# Patient Record
Sex: FEMALE | Race: WHITE | NOT HISPANIC OR LATINO | Employment: FULL TIME | URBAN - METROPOLITAN AREA
[De-identification: names, ages, dates, MRNs, and addresses within clinical notes are randomized per-mention and may not be internally consistent; named-entity substitution may affect disease eponyms.]

---

## 2017-02-16 ENCOUNTER — OFFICE VISIT (OUTPATIENT)
Dept: PSYCHIATRY | Facility: CLINIC | Age: 37
End: 2017-02-16
Payer: COMMERCIAL

## 2017-02-16 DIAGNOSIS — G47.33 OBSTRUCTIVE SLEEP APNEA SYNDROME: ICD-10-CM

## 2017-02-16 DIAGNOSIS — F41.1 GAD (GENERALIZED ANXIETY DISORDER): Primary | ICD-10-CM

## 2017-02-16 DIAGNOSIS — R53.82 CHRONIC FATIGUE: ICD-10-CM

## 2017-02-16 DIAGNOSIS — F33.41 MDD (MAJOR DEPRESSIVE DISORDER), RECURRENT, IN PARTIAL REMISSION: ICD-10-CM

## 2017-02-16 DIAGNOSIS — G47.00 INSOMNIA, UNSPECIFIED TYPE: ICD-10-CM

## 2017-02-16 PROCEDURE — 99214 OFFICE O/P EST MOD 30 MIN: CPT | Mod: S$GLB,,, | Performed by: PSYCHIATRY & NEUROLOGY

## 2017-02-16 RX ORDER — FLUOXETINE HYDROCHLORIDE 40 MG/1
40 CAPSULE ORAL DAILY
Qty: 30 CAPSULE | Refills: 2 | Status: SHIPPED | OUTPATIENT
Start: 2017-02-16 | End: 2017-06-26 | Stop reason: SDUPTHER

## 2017-02-16 NOTE — PROGRESS NOTES
"ID: 36yo WF w h/o MDD recurrent moderate and SAE. Referred for txmt by therapist Dr.W Roman. Patient stable on prozac 40mg po daily.    CC: anxiety    Interim Hx: presents on time. 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 The clothes are great. Plus sizes are the same as normal sizes. I get energized playing with the stuff." hired a friend as an assistant due to her productivity.    Seeing  ENT and had sinus surgery in 10/2016- headaches much better, no more dental pain, no more facial pain. He is also board certified in sleep- having cipap machine titration. New level is a 12 based on testing. (had previously been 8)- will have a tech change the setting. Optimistic about how this can be helpful.     Continues taking Prozac 40mg po qam. Sees YAEL Roman 1x/wk for therapy for several years.     On Psychiatric ROS:    Endorses chronic difficulty with sleep- see above, but working on cipap settings, denies anhedonia- is enjoying new work, denies feeling helpless/hopeless, dec energy (chronic- unchanged), stable concentration, inc appetite- portion control is difficult- stable weight (morbidly obese), denies dec PMA    Denies thoughts of SI/intent/plan.     Denies feeling more easily overwhelmed, +chronic ruminative thinking (not worse), +feeling tense/"on edge"- "that depends on the day."     PPHx: Denies h/o self injury "although I may be injuring myself with food"  inpt psych hospitalization  denies h/o suicide attempt     Current Psych Meds: prozac 40mg po qam  Past Psych Meds: zoloft 100mg (ineffective), effexor 75mg po qam (through PCP, can't recall effect), trazodone (nightmares), wellbutrin xl 150mg (h/a's and tmj), klonopin, ambien (ineffective)    PMHx: PCOS, AGUSTIN/cipap (doesn't wear), chronic sinusitis/deviated septum, morbid obesity    SubstHx:   T- " "none  E- quit 11/2015 due to migraines; h/o binge drinking in college  D- denies   Caffeine- minimal- sometimes tea    FamPHx: M-anxiety, hoarding; Br- autism spectrum; S- "hot mess; she has anxiety, depression but I think she has a personality d/o", F-PTSD, MDD following MI (2/2 [facility] Nam)     Musculoskeletal:  Muscle strength/Tone: no dyskinesia/ no tremor  Gait/Station- non antalgic, no assistance needed    MSE: appears stated age, well groomed, appropriate dress, morbidly obese, engages well with examiner. Good e/c. Speech reg rate and vol, nonpressured. Mood is "pretty good." Affect congruent. No physical evidence of emotion. Sensorium fully intact. Oriented to date/day/location, current events. Narrative memory intact. Intellectual function is avg based on vocab and basic fund of knowledge. Thought is c/l/gd. No tangentiality or circumstantiality. No FOI/JULIA. Denies SI/HI. Denies A/VH. No evidence of delusions. Insight fair and Judgment intact.     Suicide Risk Assessment:   Protective- age, gender, no prior attempts, no prior hospitalizations, no family h/o attempts, no ongoing substance abuse, no psychosis, , has children, denies SI/intent/plan, seeking treatment, access to treatment, future oriented, good primary support, no access to firearms    Risk- race, ongoing Axis I sxs    **Pt is at LOW imminent and long term risk of suicide given current risk factors.    Assessment:  37yo WF w h/o MDD recurrent moderate and SAE. Referred for txmt by therapist Dr.W Roman. On eval the pt has a h/o MDD recurrent moderate which is currently in remission on prozac 40mg po qam and SAE which continues. Also a h/o PTSD sxs from childhood bullying which has set a pervasive pattern of self loathing and shame for this pt- now evidencing itself in poor self care- poor attention to overall wellness. BMI >45. No acute safety concerns. Pt continues weekly therapy with longstanding therapist Elan Roman with whom I have " discussed the case. No med changes at this time. RTC 3mos.     Axis I: MDD, recurrent moderate; SAE  Axis II: cluster b traits  Axis III: morbid obesity (BMI 47), insulin insensitivity, PCOS, AGUSTIN on cpap  Axis IV: chronic health and mental health concerns  Axis V: GAF    Plan:   1. Cont Prozac 40mg po qam  2. Cont weekly therapy with YAEL Roman  4. RTC 3mos    -Spent 30min face to face with the pt; >50% time spent in counseling   -Supportive therapy and psychoeducation provided  -R/B/SE's of medications discussed with the pt who expresses understanding and chooses to take medications as prescribed.   -Pt instructed to call clinic, 911 or go to nearest emergency room if sxs worsen or pt is in   crisis. The pt expresses understanding.

## 2017-06-23 PROBLEM — R41.840 POOR CONCENTRATION: Status: ACTIVE | Noted: 2017-06-23

## 2017-06-23 PROBLEM — K21.9 GERD (GASTROESOPHAGEAL REFLUX DISEASE): Status: ACTIVE | Noted: 2017-06-23

## 2017-06-26 RX ORDER — FLUOXETINE HYDROCHLORIDE 40 MG/1
CAPSULE ORAL
Qty: 30 CAPSULE | Refills: 0 | Status: SHIPPED | OUTPATIENT
Start: 2017-06-26 | End: 2017-07-18 | Stop reason: SDUPTHER

## 2017-07-17 PROBLEM — D50.9 MICROCYTIC ANEMIA: Status: ACTIVE | Noted: 2017-07-17

## 2017-07-17 PROBLEM — R53.83 FEELING TIRED: Status: ACTIVE | Noted: 2017-07-17

## 2017-07-18 ENCOUNTER — OFFICE VISIT (OUTPATIENT)
Dept: PSYCHIATRY | Facility: CLINIC | Age: 37
End: 2017-07-18
Payer: COMMERCIAL

## 2017-07-18 VITALS
HEIGHT: 66 IN | DIASTOLIC BLOOD PRESSURE: 77 MMHG | BODY MASS INDEX: 46.95 KG/M2 | HEART RATE: 93 BPM | WEIGHT: 292.13 LBS | SYSTOLIC BLOOD PRESSURE: 159 MMHG

## 2017-07-18 DIAGNOSIS — F51.01 PRIMARY INSOMNIA: ICD-10-CM

## 2017-07-18 DIAGNOSIS — F41.1 GAD (GENERALIZED ANXIETY DISORDER): Primary | ICD-10-CM

## 2017-07-18 DIAGNOSIS — F33.41 MDD (MAJOR DEPRESSIVE DISORDER), RECURRENT, IN PARTIAL REMISSION: ICD-10-CM

## 2017-07-18 DIAGNOSIS — G47.30 SLEEP APNEA, UNSPECIFIED TYPE: ICD-10-CM

## 2017-07-18 PROCEDURE — 99999 PR PBB SHADOW E&M-EST. PATIENT-LVL II: CPT | Mod: PBBFAC,,, | Performed by: PSYCHIATRY & NEUROLOGY

## 2017-07-18 PROCEDURE — 99214 OFFICE O/P EST MOD 30 MIN: CPT | Mod: S$GLB,,, | Performed by: PSYCHIATRY & NEUROLOGY

## 2017-07-18 RX ORDER — FLUOXETINE HYDROCHLORIDE 40 MG/1
40 CAPSULE ORAL DAILY
Qty: 30 CAPSULE | Refills: 0 | Status: SHIPPED | OUTPATIENT
Start: 2017-07-18 | End: 2017-08-21 | Stop reason: SDUPTHER

## 2017-07-18 NOTE — PROGRESS NOTES
"ID: 36yo WF w h/o MDD recurrent moderate and SAE. Referred for txmt by therapist Dr.W Roman. Patient stable on prozac 40mg po daily.    CC: anxiety    Interim Hx: presents on time. Per chart review, vyvanse has been started by her pcp for a presumed BED. Has had 6lb loss.     "I just don't feel the need to continue eating. There's like an off switch. The stopping was always an issue." pt's  "not on board" with weight loss surgery. My concern is that for years of treatment with me the pt has not engaged in concerted effort at her own weight loss through dietary changes. Today continues to deny that she "believes" her dietary choices affect her mood/anxiety and reports that when "much thinner I was still depressed and anxious because I was worried about being thin." BP elevated today? First elevated read- may be a contraindication to increasing the medication dose.     Continues taking Prozac 40mg po qam. Is no longer seeing  for therapy due to scheduling conflicts, per pt. Today she reports that while anxiety and focus are improved on the vyvanse, her mood has seemed "worse. Like I don't feel like engaging in things, I'm kindof withdrawing and not wanting to participate."    I ask the pt to please make some dietary interventions and see if changes affect mood- will f/u in 4wks- pvu    On Psychiatric ROS:    Endorses chronic difficulty with sleep, but working on cipap settings, endorsing some anhedonia- although she is enjoying new work and her daughter, denies feeling helpless/hopeless, dec energy (chronic- unchanged), improved concentration on vyvanse, dec appetite- on vyvanse, +dec PMA    Denies thoughts of SI/intent/plan.     Denies feeling more easily overwhelmed, +chronic ruminative thinking (not worse), +feeling tense/"on edge"- "that depends on the day."     PPHx: Denies h/o self injury "although I may be injuring myself with food"  inpt psych hospitalization  denies h/o suicide attempt " "    Current Psych Meds: prozac 40mg po qam  Past Psych Meds: zoloft 100mg (ineffective), effexor 75mg po qam (through PCP, can't recall effect), trazodone (nightmares), wellbutrin xl 150mg (h/a's and tmj), klonopin, ambien (ineffective)    PMHx: PCOS, AGUSTIN/cipap (doesn't wear), chronic sinusitis/deviated septum, morbid obesity    SubstHx:   T- none  E- quit 11/2015 due to migraines; h/o binge drinking in college  D- denies   Caffeine- minimal- sometimes tea    FamPHx: M-anxiety, hoarding; Br- autism spectrum; S- "hot mess; she has anxiety, depression but I think she has a personality d/o", F-PTSD, MDD following MI (2/2 [facility] Nam)     Musculoskeletal:  Muscle strength/Tone: no dyskinesia/ no tremor  Gait/Station- non antalgic, no assistance needed    MSE: appears stated age, well groomed, appropriate dress, morbidly obese, engages well with examiner. Good e/c. Speech reg rate and vol, nonpressured. Mood is "I've been a little more depressed lately." Affect dysthymic- tearful, this is not different than other appts, pt with social graces intact, tearful only when discussing why weight loss won't be helpful. Sensorium fully intact. Oriented to date/day/location, current events. Narrative memory intact. Intellectual function is avg based on vocab and basic fund of knowledge. Thought is c/l/gd. No tangentiality or circumstantiality. No FOI/JULIA. Denies SI/HI. Denies A/VH. No evidence of delusions. Insight fair and Judgment intact.     Suicide Risk Assessment:   Protective- age, gender, no prior attempts, no prior hospitalizations, no family h/o attempts, no ongoing substance abuse, no psychosis, , has children, denies SI/intent/plan, seeking treatment, access to treatment, future oriented, good primary support, no access to firearms    Risk- race, ongoing Axis I sxs    **Pt is at LOW imminent and long term risk of suicide given current risk factors.    Assessment:  35yo WF w h/o MDD recurrent moderate and SAE. " Referred for txmt by therapist Dr.W Roman. On eval the pt has a h/o MDD recurrent moderate which is currently in remission on prozac 40mg po qam and SAE which continues. Also a h/o PTSD sxs from childhood bullying which has set a pervasive pattern of self loathing and shame for this pt- now evidencing itself in poor self care- poor attention to overall wellness. BMI >45. No acute safety concerns. Today the pt presents and due to cont'd wgt gain her PCP has started vyvanse 30mg po qam. Pt has had a 6lb wgt loss and is tolerating well, although bp is elevated today- will cont to monitor. Today reporting improved anxiety but mood worsening. Have asked the pt to make some dietary changes this month and f/u in 4wks- low carb, high protein. Is no longer seeing her therapist. Pt with limited effort at her own recovery. No med changes at this time. RTC 1mo.     Axis I: MDD, recurrent moderate; SAE  Axis II: cluster b traits  Axis III: morbid obesity (BMI 47), insulin insensitivity, PCOS, AGUSTIN on cpap  Axis IV: chronic health and mental health concerns  Axis V: GAF    Plan:   1. Cont Prozac 40mg po qam  2. Cont vyvanse 30mg po qam for BED (per PCP)  3. Encouraged the pt to engage in a low carb, high protein diet  4. RTC 4wks    -Spent 30min face to face with the pt; >50% time spent in counseling   -Supportive therapy and psychoeducation provided  -R/B/SE's of medications discussed with the pt who expresses understanding and chooses to take medications as prescribed.   -Pt instructed to call clinic, 911 or go to nearest emergency room if sxs worsen or pt is in   crisis. The pt expresses understanding.

## 2017-08-21 ENCOUNTER — OFFICE VISIT (OUTPATIENT)
Dept: PSYCHIATRY | Facility: CLINIC | Age: 37
End: 2017-08-21
Payer: COMMERCIAL

## 2017-08-21 VITALS — SYSTOLIC BLOOD PRESSURE: 146 MMHG | HEIGHT: 66 IN | HEART RATE: 98 BPM | DIASTOLIC BLOOD PRESSURE: 75 MMHG

## 2017-08-21 DIAGNOSIS — G47.30 SLEEP APNEA, UNSPECIFIED TYPE: ICD-10-CM

## 2017-08-21 DIAGNOSIS — F31.81 BIPOLAR II DISORDER: ICD-10-CM

## 2017-08-21 DIAGNOSIS — F41.1 GAD (GENERALIZED ANXIETY DISORDER): Primary | ICD-10-CM

## 2017-08-21 PROCEDURE — 3008F BODY MASS INDEX DOCD: CPT | Mod: S$GLB,,, | Performed by: PSYCHIATRY & NEUROLOGY

## 2017-08-21 PROCEDURE — 99999 PR PBB SHADOW E&M-EST. PATIENT-LVL II: CPT | Mod: PBBFAC,,, | Performed by: PSYCHIATRY & NEUROLOGY

## 2017-08-21 PROCEDURE — 99214 OFFICE O/P EST MOD 30 MIN: CPT | Mod: S$GLB,,, | Performed by: PSYCHIATRY & NEUROLOGY

## 2017-08-21 RX ORDER — LAMOTRIGINE 25 MG/1
TABLET ORAL
Qty: 45 TABLET | Refills: 0 | Status: SHIPPED | OUTPATIENT
Start: 2017-08-21 | End: 2017-09-15 | Stop reason: SDUPTHER

## 2017-08-21 RX ORDER — FLUOXETINE HYDROCHLORIDE 40 MG/1
40 CAPSULE ORAL DAILY
Qty: 30 CAPSULE | Refills: 0 | Status: SHIPPED | OUTPATIENT
Start: 2017-08-21 | End: 2017-09-21 | Stop reason: SDUPTHER

## 2017-08-21 NOTE — PROGRESS NOTES
"ID: 36yo WF w h/o MDD recurrent moderate and SAE. Referred for txmt by therapist Dr.W Roman. Patient stable on prozac 40mg po daily.    CC: anxiety    Interim Hx: presents on time. Chart reviewed. "i just feel down. I have no interest in anything. 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 Well I haven't made any money so it's not totally strange." vyvanse has not been refilled- for unclear reasons, but ultimately the pt had some concerns about her blood pressure. Likely will not restart. The discontinuation of vyvanse likely has affected mood.     Pt then becomes tearful, "i've been on antidepressants for so long and no real difference. I'm starting to wonder if it's something more." pt inquiring about bipolar II d/o.     Call pt's  for collateral- per  he has not noticed "clear episodes", "but I haven't been paying attention to that. We definitely argue about her spending and there are times when she's better than others, but my main concern is that she'll just be off for extended periods of time. she just hides out, cooped up in one little room. There will be whole weekends when I may see her for 4 hours if i'm mini because she's in the bed the rest of the time."    Pt identifying times with increased productivity, dec'd need for sleep, inc'd spending.     Unclear, but warrants a trial of txmt with lamictal- pt and  motivated for change in txmt plan.     On Psychiatric ROS:    Endorses chronic difficulty with sleep, but working on cipap settings, endorsing anhedonia, denies feeling helpless/hopeless, dec energy (chronic- unchanged), improved concentration on vyvanse, dec appetite- on vyvanse, +dec PMA    Denies thoughts of SI/intent/plan.     Denies feeling more easily overwhelmed, +chronic ruminative thinking (not worse), +feeling tense/"on edge"- "that depends on the day."     PPHx: Denies h/o self injury "although I may be injuring myself with food"  inpt " "psych hospitalization  denies h/o suicide attempt     Current Psych Meds: prozac 40mg po qam  Past Psych Meds: zoloft 100mg (ineffective), effexor 75mg po qam (through PCP, can't recall effect), trazodone (nightmares), wellbutrin xl 150mg (h/a's and tmj), klonopin, ambien (ineffective)    PMHx: PCOS, AGUSTIN/cipap (doesn't wear), chronic sinusitis/deviated septum, morbid obesity    SubstHx:   T- none  E- quit 11/2015 due to migraines; h/o binge drinking in college  D- denies   Caffeine- minimal- sometimes tea    FamPHx: M-anxiety, hoarding; Br- autism spectrum; S- "hot mess; she has anxiety, depression but I think she has a personality d/o", F-PTSD, MDD following MI (2/2 [facility] Nam)     Musculoskeletal:  Muscle strength/Tone: no dyskinesia/ no tremor  Gait/Station- non antalgic, no assistance needed    MSE: appears stated age, well groomed, appropriate dress, morbidly obese, engages well with examiner. Good e/c. Speech reg rate and vol, nonpressured. Mood is "in the dumps" Affect dysthymic- tearful, this is not different than other appts, pt with social graces intact, tearful only when discussing her diagnosis. Sensorium fully intact. Oriented to date/day/location, current events. Narrative memory intact. Intellectual function is avg based on vocab and basic fund of knowledge. Thought is c/l/gd. No tangentiality or circumstantiality. No FOI/JULIA. Denies SI/HI. Denies A/VH. No evidence of delusions. Insight fair and Judgment intact.     Suicide Risk Assessment:   Protective- age, gender, no prior attempts, no prior hospitalizations, no family h/o attempts, no ongoing substance abuse, no psychosis, , has children, denies SI/intent/plan, seeking treatment, access to treatment, future oriented, good primary support, no access to firearms    Risk- race, ongoing Axis I sxs    **Pt is at LOW imminent and long term risk of suicide given current risk factors.    Assessment:  35yo WF w h/o MDD recurrent moderate and SAE. " Referred for txmt by therapist Dr.W Roman. On eval the pt has a h/o MDD recurrent moderate which is currently in remission on prozac 40mg po qam and SAE which continues. Also a h/o PTSD sxs from childhood bullying which has set a pervasive pattern of self loathing and shame for this pt- now evidencing itself in poor self care- poor attention to overall wellness. BMI >45. No acute safety concerns. Last appt due to cont'd wgt gain her PCP has started vyvanse 30mg po qam. Pt has had a 6lb wgt loss and is tolerating well, although bp was elevated at that appt. She reported improved anxiety but mood worsening. Asked the pt to make some dietary changes and f/u in 4wks. Today she presents and we call  for collateral- pt reporting concern for bipolar II d/o. I am uncertain of this- think sxs more c/w personality structure however she and  motivated for trial of lamictal for txmt of bipolar II depression- pt identifying sxs c/w diag. Pt with limited effort at her own recovery- does not implement health lifestyle changes but is also no longer taking vyvanse. Likely wgt gain but clinic w/o scale today. New problem: BIPOLAR II d/o.  RTC 1mo.     Axis I: Bipolar II d/o, current depressed; SAE  Axis II: cluster b traits  Axis III: morbid obesity (BMI 47), insulin insensitivity, PCOS, AGUSTIN on cpap  Axis IV: chronic health and mental health concerns  Axis V: GAF 60    Plan:   1. Cont Prozac 40mg po qam  2. No longer on vyvanse  3. Start titration of lamictal  4. Encouraged the pt to engage in a low carb, high protein diet  5. RTC 4wks    -Spent 30min face to face with the pt; >50% time spent in counseling   -Supportive therapy and psychoeducation provided  -R/B/SE's of medications discussed with the pt who expresses understanding and chooses to take medications as prescribed.   -Pt instructed to call clinic, 911 or go to nearest emergency room if sxs worsen or pt is in   crisis. The pt expresses understanding.

## 2017-09-15 DIAGNOSIS — F31.81 BIPOLAR II DISORDER: ICD-10-CM

## 2017-09-15 RX ORDER — LAMOTRIGINE 25 MG/1
TABLET ORAL
Qty: 45 TABLET | Refills: 0 | Status: SHIPPED | OUTPATIENT
Start: 2017-09-15 | End: 2017-09-21 | Stop reason: SDUPTHER

## 2017-09-15 RX ORDER — LAMOTRIGINE 25 MG/1
TABLET ORAL
Qty: 45 TABLET | Refills: 0 | Status: SHIPPED | OUTPATIENT
Start: 2017-09-15 | End: 2017-09-15 | Stop reason: SDUPTHER

## 2017-09-21 ENCOUNTER — OFFICE VISIT (OUTPATIENT)
Dept: PSYCHIATRY | Facility: CLINIC | Age: 37
End: 2017-09-21
Payer: COMMERCIAL

## 2017-09-21 VITALS
HEART RATE: 89 BPM | SYSTOLIC BLOOD PRESSURE: 143 MMHG | DIASTOLIC BLOOD PRESSURE: 76 MMHG | WEIGHT: 291.25 LBS | HEIGHT: 66 IN | BODY MASS INDEX: 46.81 KG/M2

## 2017-09-21 DIAGNOSIS — Z99.89 CPAP (CONTINUOUS POSITIVE AIRWAY PRESSURE) DEPENDENCE: ICD-10-CM

## 2017-09-21 DIAGNOSIS — G47.00 INSOMNIA, UNSPECIFIED TYPE: ICD-10-CM

## 2017-09-21 DIAGNOSIS — F41.1 GAD (GENERALIZED ANXIETY DISORDER): ICD-10-CM

## 2017-09-21 DIAGNOSIS — G47.30 SLEEP APNEA, UNSPECIFIED TYPE: ICD-10-CM

## 2017-09-21 DIAGNOSIS — F31.81 BIPOLAR II DISORDER: Primary | ICD-10-CM

## 2017-09-21 PROBLEM — J00 ACUTE NASOPHARYNGITIS: Status: ACTIVE | Noted: 2017-09-21

## 2017-09-21 PROCEDURE — 99214 OFFICE O/P EST MOD 30 MIN: CPT | Mod: S$GLB,,, | Performed by: PSYCHIATRY & NEUROLOGY

## 2017-09-21 PROCEDURE — 3008F BODY MASS INDEX DOCD: CPT | Mod: S$GLB,,, | Performed by: PSYCHIATRY & NEUROLOGY

## 2017-09-21 PROCEDURE — 99999 PR PBB SHADOW E&M-EST. PATIENT-LVL II: CPT | Mod: PBBFAC,,, | Performed by: PSYCHIATRY & NEUROLOGY

## 2017-09-21 RX ORDER — LAMOTRIGINE 150 MG/1
150 TABLET ORAL DAILY
Qty: 30 TABLET | Refills: 0 | Status: SHIPPED | OUTPATIENT
Start: 2017-09-21 | End: 2017-10-26 | Stop reason: SDUPTHER

## 2017-09-21 RX ORDER — LAMOTRIGINE 100 MG/1
100 TABLET ORAL DAILY
Qty: 14 TABLET | Refills: 0 | Status: SHIPPED | OUTPATIENT
Start: 2017-09-21 | End: 2017-10-26 | Stop reason: DRUGHIGH

## 2017-09-21 RX ORDER — FLUOXETINE HYDROCHLORIDE 40 MG/1
40 CAPSULE ORAL DAILY
Qty: 30 CAPSULE | Refills: 0 | Status: SHIPPED | OUTPATIENT
Start: 2017-09-21 | End: 2017-10-26 | Stop reason: SDUPTHER

## 2017-09-21 NOTE — PROGRESS NOTES
"ID: 36yo WF w h/o MDD recurrent moderate and SAE. Referred for txmt by therapist Dr.W Roman. Patient stable on prozac 40mg po daily. Last appt diag changed to bipolar II d/o based on sxs report from pt and - started lamictal titration.     CC: anxiety    Interim Hx: presents on time. Chart reviewed.     Pt's  wanted to be called, we call him on speaker, "i just wanted to revisit the conversation from last appt about the bipolar II disorder."  has read some articles and through articles "in the articles I really saw a lot of Lynne. I think I feel more comfortable that the diagnosis is likely bipolar II." had misunderstood conversation at last appt and thought we had "tabled" the bipolar II discussion and he wanted to be certain we had initiated correct treatment.     Per pt report, she is tolerating the lamictal well. Endorses some early s/e's of inc'd vivid dreams which have resolved now. Through discussion we also realize that she is taking prozac at night- pt will move both prozac and lamictal to am dosing.     No mood change on current dose of lamictal (as expected)- now only on 75mg po qam x 1wk- has one more week at current dose and then cont'd titration.     On Psychiatric ROS:    Endorses chronic difficulty with sleep, but working on cipap settings, endorsing anhedonia, denies feeling helpless/hopeless, dec energy (chronic- unchanged), improved concentration on vyvanse, dec appetite- on vyvanse, +dec PMA    Denies thoughts of SI/intent/plan.     Denies feeling more easily overwhelmed, +chronic ruminative thinking (not worse), +feeling tense/"on edge"- "that depends on the day."     PPHx: Denies h/o self injury "although I may be injuring myself with food"  inpt psych hospitalization  denies h/o suicide attempt     Current Psych Meds: prozac 40mg po qam, lamictal 75mg po daily x 1wk  Past Psych Meds: zoloft 100mg (ineffective), effexor 75mg po qam (through PCP, can't recall effect), " "trazodone (nightmares), wellbutrin xl 150mg (h/a's and tmj), klonopin, ambien (ineffective)    PMHx: PCOS, AGUSTIN/cipap (doesn't wear), chronic sinusitis/deviated septum, morbid obesity    SubstHx:   T- none  E- quit 11/2015 due to migraines; h/o binge drinking in college  D- denies   Caffeine- minimal- sometimes tea    FamPHx: M-anxiety, hoarding; Br- autism spectrum; S- "hot mess; she has anxiety, depression but I think she has a personality d/o", F-PTSD, MDD following MI (2/2 [facility] Nam)     Musculoskeletal:  Muscle strength/Tone: no dyskinesia/ no tremor  Gait/Station- non antalgic, no assistance needed    Blood pressure (!) 143/76, pulse 89, height 5' 5.5" (1.664 m), weight 132.1 kg (291 lb 3.6 oz), last menstrual period 09/14/2017.    MSE: appears stated age, well groomed, appropriate dress, morbidly obese, engages well with examiner. Good e/c. Speech reg rate and vol, nonpressured. Mood is "a little better other than this head cold" Affect congruent- tearful, baseline for this pt, pt with social graces intact, tearful only when discussing her . Sensorium fully intact. Oriented to date/day/location, current events. Narrative memory intact. Intellectual function is avg based on vocab and basic fund of knowledge. Thought is c/l/gd. No tangentiality or circumstantiality. No FOI/JULIA. Denies SI/HI. Denies A/VH. No evidence of delusions. Insight fair and Judgment intact.     Suicide Risk Assessment:   Protective- age, gender, no prior attempts, no prior hospitalizations, no family h/o attempts, no ongoing substance abuse, no psychosis, , has children, denies SI/intent/plan, seeking treatment, access to treatment, future oriented, good primary support, no access to firearms    Risk- race, ongoing Axis I sxs    **Pt is at LOW imminent and long term risk of suicide given current risk factors.    Assessment:  37yo WF w h/o MDD recurrent moderate and SAE. Referred for txmt by therapist Dr.W Roman. On eval the " pt has a h/o MDD recurrent moderate which is currently in remission on prozac 40mg po qam and SAE which continues. Also a h/o PTSD sxs from childhood bullying which has set a pervasive pattern of self loathing and shame for this pt- now evidencing itself in poor self care- poor attention to overall wellness. BMI >45. No acute safety concerns. Last appt due to cont'd wgt gain her PCP has started vyvanse 30mg po qam. Pt has had a 6lb wgt loss and is tolerating well, although bp was elevated at that appt. She reported improved anxiety but mood worsening. Asked the pt to make some dietary changes and f/u in 4wks. Last appt she presented and we called  for collateral- pt reported concern for bipolar II d/o. I am uncertain of this- think sxs more c/w personality structure however she and  motivated for trial of lamictal for txmt of bipolar II depression- pt identifying sxs c/w diag. Pt with limited effort at her own recovery- does not implement health lifestyle changes but is also no longer taking vyvanse. Today reports tolerating lamictal well-now on 75mg x 1wk- no mood improvement, yet. Will cont titration. RTC 1mo.     Axis I: Bipolar II d/o, current depressed; SAE  Axis II: cluster b traits  Axis III: morbid obesity (BMI 47), insulin insensitivity, PCOS, AGUSTIN on cpap  Axis IV: chronic health and mental health concerns  Axis V: GAF 60    Plan:   1. Cont Prozac 40mg po qam  2. No longer on vyvanse  3. Inc lamictal to 100mg po daily  4. Encouraged the pt to engage in a low carb, high protein diet  5. RTC 4wks    -Spent 30min face to face with the pt; >50% time spent in counseling   -Supportive therapy and psychoeducation provided  -R/B/SE's of medications discussed with the pt who expresses understanding and chooses to take medications as prescribed.   -Pt instructed to call clinic, 911 or go to nearest emergency room if sxs worsen or pt is in   crisis. The pt expresses understanding.

## 2017-10-26 ENCOUNTER — OFFICE VISIT (OUTPATIENT)
Dept: PSYCHIATRY | Facility: CLINIC | Age: 37
End: 2017-10-26
Payer: COMMERCIAL

## 2017-10-26 VITALS
SYSTOLIC BLOOD PRESSURE: 159 MMHG | BODY MASS INDEX: 47.09 KG/M2 | DIASTOLIC BLOOD PRESSURE: 80 MMHG | HEART RATE: 82 BPM | WEIGHT: 293 LBS | HEIGHT: 66 IN

## 2017-10-26 DIAGNOSIS — F51.01 PRIMARY INSOMNIA: ICD-10-CM

## 2017-10-26 DIAGNOSIS — F31.81 BIPOLAR II DISORDER: ICD-10-CM

## 2017-10-26 DIAGNOSIS — F33.41 MDD (MAJOR DEPRESSIVE DISORDER), RECURRENT, IN PARTIAL REMISSION: Primary | ICD-10-CM

## 2017-10-26 DIAGNOSIS — F41.1 GAD (GENERALIZED ANXIETY DISORDER): ICD-10-CM

## 2017-10-26 PROCEDURE — 99214 OFFICE O/P EST MOD 30 MIN: CPT | Mod: S$GLB,,, | Performed by: PSYCHIATRY & NEUROLOGY

## 2017-10-26 PROCEDURE — 99999 PR PBB SHADOW E&M-EST. PATIENT-LVL II: CPT | Mod: PBBFAC,,, | Performed by: PSYCHIATRY & NEUROLOGY

## 2017-10-26 RX ORDER — FLUOXETINE HYDROCHLORIDE 40 MG/1
40 CAPSULE ORAL DAILY
Qty: 30 CAPSULE | Refills: 1 | Status: SHIPPED | OUTPATIENT
Start: 2017-10-26 | End: 2017-12-28 | Stop reason: SDUPTHER

## 2017-10-26 RX ORDER — LAMOTRIGINE 200 MG/1
200 TABLET ORAL DAILY
Qty: 30 TABLET | Refills: 1 | Status: SHIPPED | OUTPATIENT
Start: 2017-10-26 | End: 2017-12-28 | Stop reason: SDUPTHER

## 2017-10-26 NOTE — PROGRESS NOTES
"ID: 36yo WF w h/o MDD recurrent moderate and SAE. Referred for txmt by therapist Dr.W Roman. Patient stable on prozac 40mg po daily. Last appt diag changed to bipolar II d/o based on sxs report from pt and - started lamictal titration.     CC: anxiety    Interim Hx: presents on time. Chart reviewed.     Pt now on lamictal on 150mg po qam. "kindof makes me nauseous sometimes, but it's gotten better with time." pt has moved the lamictal to night dosing and this has helped. Now denies s/e's.     Has to be prompted to discuss mood. "i haven't been having that good of thoughts. Downer type stuff. Oh I don't want to do this anymore, and those thoughts just bombard me but they kind of come and go." she describes her mood today as, "pretty fine."     Pt is no longer in therapy. She had previously been seeing  doing deep psychotherapy work on childhood trauma, inner child. Can no longer afford, but I really think this pt would benefit from DBT. Have referred to [facility] [place] and given her the contact info. Pt does not make beh changes so it is hard to expect big gains.     Her work continues to not be profitable. It also requires her to work at times that are hard on the family. Unclear about the sustainability of this.     On Psychiatric ROS:    Endorses chronic difficulty with sleep, but working on cipap settings, endorsing some cont'd anhedonia, denies feeling helpless/hopeless, dec energy (chronic- unchanged), stable concentration, stable appetite- steady weight gain, +dec PMA    Denies thoughts of SI/intent/plan.     Denies feeling more easily overwhelmed, +chronic ruminative thinking (not worse), +feeling tense/"on edge"- "that depends on the day."     PPHx: Denies h/o self injury "although I may be injuring myself with food"  inpt psych hospitalization  denies h/o suicide attempt     Current Psych Meds: prozac 40mg po qam, lamictal 150mg po qam  Past Psych Meds: zoloft 100mg (ineffective), effexor 75mg po " "qam (through PCP, can't recall effect), trazodone (nightmares), wellbutrin xl 150mg (h/a's and tmj), klonopin, ambien (ineffective)    PMHx: PCOS, AGUSTIN/cipap (doesn't wear), chronic sinusitis/deviated septum, morbid obesity    SubstHx:   T- none  E- quit 11/2015 due to migraines; h/o binge drinking in college  D- denies   Caffeine- minimal- sometimes tea    FamPHx: M-anxiety, hoarding; Br- autism spectrum; S- "hot mess; she has anxiety, depression but I think she has a personality d/o", F-PTSD, MDD following MI (2/2 Antonio Nam)     Musculoskeletal:  Muscle strength/Tone: no dyskinesia/ no tremor  Gait/Station- non antalgic, no assistance needed    Blood pressure (!) 159/80, pulse 82, height 5' 5.5" (1.664 m), weight 134.9 kg (297 lb 4.8 oz), last menstrual period 10/26/2017.    MSE: appears stated age, well groomed, appropriate dress, morbidly obese, engages well with examiner. Good e/c. Speech reg rate and vol, nonpressured. Mood is "pretty fine. Susy woke up in the middle of the night" Affect congruent- no tearfulness.  Sensorium fully intact. Oriented to date/day/location, current events. Narrative memory intact. Intellectual function is avg based on vocab and basic fund of knowledge. Thought is c/l/gd. No tangentiality or circumstantiality. No FOI/JULIA. Denies SI/HI. Denies A/VH. No evidence of delusions. Insight fair and Judgment intact.     Suicide Risk Assessment:   Protective- age, gender, no prior attempts, no prior hospitalizations, no family h/o attempts, no ongoing substance abuse, no psychosis, , has children, denies SI/intent/plan, seeking treatment, access to treatment, future oriented, good primary support, no access to firearms    Risk- race, ongoing Axis I sxs    **Pt is at LOW imminent and long term risk of suicide given current risk factors.    Assessment:  35yo WF w h/o MDD recurrent moderate and SAE. Referred for txmt by therapist Dr.W Roman. On eval the pt has a h/o MDD recurrent " moderate which is currently in remission on prozac 40mg po qam and SAE which continues. Also a h/o PTSD sxs from childhood bullying which has set a pervasive pattern of self loathing and shame for this pt- now evidencing itself in poor self care- poor attention to overall wellness. BMI >45. No acute safety concerns. Last appt due to cont'd wgt gain her PCP has started vyvanse 30mg po qam. Pt has had a 6lb wgt loss and is tolerating well, although bp was elevated at that appt. She reported improved anxiety but mood worsening. Asked the pt to make some dietary changes and f/u in 4wks. Last appt she presented and we called  for collateral- pt reported concern for bipolar II d/o. I am uncertain of this- think sxs more c/w personality structure however she and  motivated for trial of lamictal for txmt of bipolar II depression- pt identifying sxs c/w diag. Pt with limited effort at her own recovery- does not implement health lifestyle changes but is also no longer taking vyvanse. Today reports tolerating lamictal well-now on 150mg- denies noticing any mood improvement, yet. Will cont titration to 200mg po qam. Really encouraged therapy today. RTC 2mos.    Axis I: Bipolar II d/o, current depressed; SAE  Axis II: cluster b traits  Axis III: morbid obesity (BMI 47), insulin insensitivity, PCOS, AGUSTIN on cpap  Axis IV: chronic health and mental health concerns  Axis V: GAF 60    Plan:   1. Cont Prozac 40mg po qam  2. Inc lamictal to 200mg po daily  3. Encouraged the pt to engage in a low carb, high protein diet  5. RTC 2mos.    -Spent 30min face to face with the pt; >50% time spent in counseling   -Supportive therapy and psychoeducation provided  -R/B/SE's of medications discussed with the pt who expresses understanding and chooses to take medications as prescribed.   -Pt instructed to call clinic, 911 or go to nearest emergency room if sxs worsen or pt is in   crisis. The pt expresses understanding.

## 2017-11-13 ENCOUNTER — PATIENT MESSAGE (OUTPATIENT)
Dept: PSYCHIATRY | Facility: CLINIC | Age: 37
End: 2017-11-13

## 2017-12-22 PROBLEM — F90.2 ATTENTION DEFICIT HYPERACTIVITY DISORDER (ADHD), COMBINED TYPE: Status: ACTIVE | Noted: 2017-12-22

## 2017-12-28 ENCOUNTER — OFFICE VISIT (OUTPATIENT)
Dept: PSYCHIATRY | Facility: CLINIC | Age: 37
End: 2017-12-28
Payer: COMMERCIAL

## 2017-12-28 VITALS
DIASTOLIC BLOOD PRESSURE: 72 MMHG | HEIGHT: 66 IN | HEART RATE: 91 BPM | SYSTOLIC BLOOD PRESSURE: 152 MMHG | WEIGHT: 293 LBS | BODY MASS INDEX: 47.09 KG/M2

## 2017-12-28 DIAGNOSIS — G47.00 INSOMNIA, UNSPECIFIED TYPE: ICD-10-CM

## 2017-12-28 DIAGNOSIS — F41.1 GAD (GENERALIZED ANXIETY DISORDER): ICD-10-CM

## 2017-12-28 DIAGNOSIS — F31.81 BIPOLAR II DISORDER: Primary | ICD-10-CM

## 2017-12-28 PROCEDURE — 99214 OFFICE O/P EST MOD 30 MIN: CPT | Mod: S$GLB,,, | Performed by: PSYCHIATRY & NEUROLOGY

## 2017-12-28 PROCEDURE — 99999 PR PBB SHADOW E&M-EST. PATIENT-LVL II: CPT | Mod: PBBFAC,,, | Performed by: PSYCHIATRY & NEUROLOGY

## 2017-12-28 RX ORDER — FLUOXETINE HYDROCHLORIDE 40 MG/1
40 CAPSULE ORAL DAILY
Qty: 30 CAPSULE | Refills: 2 | Status: SHIPPED | OUTPATIENT
Start: 2017-12-28 | End: 2018-02-22

## 2017-12-28 RX ORDER — LAMOTRIGINE 200 MG/1
200 TABLET ORAL DAILY
Qty: 30 TABLET | Refills: 2 | Status: SHIPPED | OUTPATIENT
Start: 2017-12-28 | End: 2018-07-12 | Stop reason: SDUPTHER

## 2017-12-28 NOTE — PROGRESS NOTES
"ID: 34yo WF w h/o MDD recurrent moderate and SAE. Referred for txmt by therapist Dr.W Roman. Patient stable on prozac 40mg po daily. Last appt diag changed to bipolar II d/o based on sxs report from pt and - started lamictal titration.     CC: anxiety    Interim Hx: presents on time. Chart reviewed.     "doing better actually. I got my libido back which has been great." reports mood has been more stable and she has been better able to see impulsivity clearly. Is now on a budget for 2018 which her  is helping with. Pt expressing motivation.     Pt now on lamictal 200mg po qam. Also continues vyvanse through PCP for binge eating disorder (wgt is unchanged) "i think it's kept me from eating as often" - discuss that if the vyvanse had been effective the pt would have lost weight, OR the pt has increased her calories at other meal times, either way, in the risk/benefit analysis, risks may now outweigh benefit and the continued use of vyvanse should be under consideration. Perhaps pt is voluntarily overriding the vyvanse benefit and could begin being more thoughtful about caloric consumption. Pt expresses understanding.     Has "basically quit" work. 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     Copied from my previous note, "Pt is no longer in therapy. She had previously been seeing  doing deep psychotherapy work on childhood trauma, inner child. Can no longer afford, but I really think this pt would benefit from DBT. Have referred to [facility] [place] and given her the contact info. Pt does not make beh changes so it is hard to expect big gains."     On Psychiatric ROS:    Endorses chronic difficulty with sleep, but working on cipap settings, endorsing improved anhedonia, denies feeling helpless/hopeless, dec energy (chronic- unchanged), stable concentration, stable appetite- weight " "remains unchanged, stable PMA    Denies thoughts of SI/intent/plan.     Denies feeling more easily overwhelmed, +chronic ruminative thinking (not worse), +feeling tense/"on edge"- "that depends on the day."     PPHx: Denies h/o self injury "although I may be injuring myself with food"  inpt psych hospitalization  denies h/o suicide attempt     Current Psych Meds: prozac 40mg po qam, lamictal 150mg po qam  Past Psych Meds: zoloft 100mg (ineffective), effexor 75mg po qam (through PCP, can't recall effect), trazodone (nightmares), wellbutrin xl 150mg (h/a's and tmj), klonopin, ambien (ineffective)    PMHx: PCOS, AGUSTIN/cipap (doesn't wear), chronic sinusitis/deviated septum, morbid obesity    SubstHx:   T- none  E- quit 11/2015 due to migraines; h/o binge drinking in college  D- denies   Caffeine- minimal- sometimes tea    FamPHx: M-anxiety, hoarding; Br- autism spectrum; S- "hot mess; she has anxiety, depression but I think she has a personality d/o", F-PTSD, MDD following MI (2/2 Antonio Nam)     Musculoskeletal:  Muscle strength/Tone: no dyskinesia/ no tremor  Gait/Station- non antalgic, no assistance needed    Blood pressure (!) 152/72, pulse 91, height 5' 5.5" (1.664 m), weight 133.4 kg (294 lb 1.6 oz), last menstrual period 12/28/2017.    MSE: appears stated age, well groomed, appropriate dress, morbidly obese, engages well with examiner. Good e/c. Speech reg rate and vol, nonpressured. Mood is "pretty good. Woke up feeling pretty good." Affect congruent- no tearfulness.  Sensorium fully intact. Oriented to date/day/location, current events. Narrative memory intact. Intellectual function is avg based on vocab and basic fund of knowledge. Thought is c/l/gd. No tangentiality or circumstantiality. No FOI/JULIA. Denies SI/HI. Denies A/VH. No evidence of delusions. Insight fair and Judgment intact.     Suicide Risk Assessment:   Protective- age, gender, no prior attempts, no prior hospitalizations, no family h/o attempts, no " ongoing substance abuse, no psychosis, , has children, denies SI/intent/plan, seeking treatment, access to treatment, future oriented, good primary support, no access to firearms    Risk- race, ongoing Axis I sxs    **Pt is at LOW imminent and long term risk of suicide given current risk factors.    Assessment:  35yo WF w h/o MDD recurrent moderate and SAE. Referred for txmt by therapist Dr.W Roman. On eval the pt has a h/o MDD recurrent moderate which is currently in remission on prozac 40mg po qam and SAE which continues. Also a h/o PTSD sxs from childhood bullying which has set a pervasive pattern of self loathing and shame for this pt- now evidencing itself in poor self care- poor attention to overall wellness. BMI >45. No acute safety concerns. Last appt due to cont'd wgt gain her PCP has started vyvanse 30mg po qam. Pt has had a 6lb wgt loss and is tolerating well, although bp was elevated at that appt. She reported improved anxiety but mood worsening. Asked the pt to make some dietary changes and f/u in 4wks. Last appt she presented and we called  for collateral- pt reported concern for bipolar II d/o. I am uncertain of this- think sxs more c/w personality structure however she and  motivated for trial of lamictal for txmt of bipolar II depression- pt identifying sxs c/w diag. Pt with limited effort at her own recovery- does not implement health lifestyle changes but is also no longer taking vyvanse. Today reports tolerating lamictal well-now on 200mg- endorsing mood improvement and improved impulsivity. Have cont'd to encourage therapy. Pt with lots of room for beh changes- does not implement. RTC 3 mos    Axis I: Bipolar II d/o, current depressed; SAE  Axis II: cluster b traits  Axis III: morbid obesity (BMI 47), insulin insensitivity, PCOS, AGUSTIN on cpap  Axis IV: chronic health and mental health concerns  Axis V: GAF 60    Plan:   1. Cont Prozac 40mg po qam  2. Cont lamictal 200mg po  daily  3. Also continues Vyvanse 30mg through PCP for BED (wgt remains the same so benefit is questionable)  4. Encouraged the pt to engage in a low carb, high protein diet  5. RTC 3 mos    -Spent 30min face to face with the pt; >50% time spent in counseling   -Supportive therapy and psychoeducation provided  -R/B/SE's of medications discussed with the pt who expresses understanding and chooses to take medications as prescribed.   -Pt instructed to call clinic, 911 or go to nearest emergency room if sxs worsen or pt is in   crisis. The pt expresses understanding.

## 2018-03-12 PROBLEM — L30.4 INTERTRIGO: Status: ACTIVE | Noted: 2018-03-12

## 2018-03-12 PROBLEM — I10 ESSENTIAL HYPERTENSION: Status: ACTIVE | Noted: 2018-03-12

## 2018-03-12 PROBLEM — J00 ACUTE NASOPHARYNGITIS: Status: RESOLVED | Noted: 2017-09-21 | Resolved: 2018-03-12

## 2018-03-28 ENCOUNTER — OFFICE VISIT (OUTPATIENT)
Dept: PODIATRY | Facility: CLINIC | Age: 38
End: 2018-03-28
Payer: COMMERCIAL

## 2018-03-28 VITALS — WEIGHT: 289.81 LBS | HEIGHT: 66 IN | BODY MASS INDEX: 46.58 KG/M2

## 2018-03-28 DIAGNOSIS — M72.2 PLANTAR FASCIITIS OF RIGHT FOOT: Primary | ICD-10-CM

## 2018-03-28 DIAGNOSIS — M21.6X1 ACQUIRED EQUINUS DEFORMITY OF BOTH FEET: ICD-10-CM

## 2018-03-28 DIAGNOSIS — M21.6X2 ACQUIRED EQUINUS DEFORMITY OF BOTH FEET: ICD-10-CM

## 2018-03-28 DIAGNOSIS — M72.2 PLANTAR FASCIITIS OF LEFT FOOT: ICD-10-CM

## 2018-03-28 PROCEDURE — 99999 PR PBB SHADOW E&M-EST. PATIENT-LVL III: CPT | Mod: PBBFAC,,, | Performed by: PODIATRIST

## 2018-03-28 PROCEDURE — 99203 OFFICE O/P NEW LOW 30 MIN: CPT | Mod: S$GLB,,, | Performed by: PODIATRIST

## 2018-03-28 RX ORDER — METHYLPREDNISOLONE 4 MG/1
TABLET ORAL
Qty: 1 PACKAGE | Refills: 0 | Status: SHIPPED | OUTPATIENT
Start: 2018-03-28 | End: 2018-04-16

## 2018-03-28 NOTE — PATIENT INSTRUCTIONS
1. Stretch calf and plantar fascia at least 3x per day for 30 sec and before getting out of bed.    2. Supportive shoes at all times (athletic shoe including wu, new balance, asics, HOKA or casual shoes like Dansko, Omayra, Naot, Vionoic, Fit flop  clog or wedge with extra heel padding and arch support. For house slippers would recommend Fitflop or Spenco found on amazon.com, Never walk barefoot or in flats.    (Varsity sports, Phidippides, [place] running company, Masseys, Goodfeet, Cantilever, Feet First, Foot Solutions, Therapeutic shoes, SAS, [facility] [employer] pro shop) http://[email or web address] MET-TECH.SearchForce/    3. Orthotic (recommend the following brands: Superfeet, Spenco, Powerstep, Sof Sole Fit Series)              4. Medrol Dose pack    5. ICE massage with frozen water bottle 2x per day for 30 minutes.    6. Consider night splint, custom orthotics, therapy and/or steroid injection    What Is Plantar Fasciitis?   The plantar fascia is a ligament-like band running from your heel to the ball of your foot. This band pulls on the heel bone, raising the arch of your foot as it pushes off the ground. But if your foot moves incorrectly, the plantar fascia may become strained. The fascia may swell and its tiny fibers may begin to fray, causing plantar fasciitis.  Causes  Plantar fasciitis is often caused by poor foot mechanics. If your foot flattens too much, the fascia may overstretch and swell. If your foot flattens too little, the fascia may ache from being pulled too tight.    The plantar fascia is a thick, fibrous layer of tissue that covers the bones on the bottom of your foot. It holds the foot bones in an arched position. Plantar fasciitis is a painful swelling of the plantar fascia.  A heel spur is an overgrowth of bone where the plantar fascia attaches to the heel bone. The heel spur itself usually doesnt cause pain. However, the heel spur might be a sign of plantar fasciitis which may cause your foot pain.  There is no specific treatment for heel spurs.   Plantar fasciitis can develop slowly or suddenly. It usually affects one foot at a time. Heel pain can feel sharp, like a knife sticking into the bottom of your foot. You may feel pain after exercising, long-distance jogging, stair climbing, long periods of standing, or after standing up.  Risk factors for plantar fasciitis include: arthritis, diabetes, obesity or recent weight gain, flat foot, and having high arches. Wearing high heels, loose shoes, or shoes with poor arch support adds to the risk.    Foot pain is usually worse in the morning. But it improves with walking. By the end of the day there may be a dull aching. Treatment includes short-term rest and controlling inflammation. It may take up to 9 months before all symptoms go away. In rare cases, a steroid injection in the foot, or surgery, may be needed.  Home care  · If you are overweight, lose weight to help healing.  · Choose supportive shoes with good arch support and shock absorbency. Replace athletic shoes when they become worn out. Dont walk or run barefoot.  · Premade or custom-fitted shoe inserts may be helpful. Inserts made of silicone seem to be the most effective. Custom-made inserts can be provided by a podiatrist or foot specialist, physical therapist, or orthopedist.  · Premade or custom-made night splints keep the heel stretched out while you sleep. They may prevent morning pain.  · Avoid activities that stress the feet: jogging, prolonged standing or walking, contact sports, etc.  · First thing in the morning and before sports, stretch the bottom of your foot. Gently flex your ankle so the toes move toward your knee.  · Icing may help control heel pain. Apply an ice pack to the heel for 10-20 minutes as a preventive. Or ice your heel after a severe flare-up of symptoms. You may repeat this every 1-2 hours as needed.  · You may use over-the-counter pain medicine to control pain, unless  another medicine was prescribed. Anti-inflammatory pain medicines, such as ibuprofen or naproxen, may work better than acetaminophen. If you have chronic liver or kidney disease or ever had a stomach ulcer or GI bleeding, talk with your healthcare provider before using these medicines.  · Shoe inserts, a night splint, or a special boot may be needed. Use these as directed by your healthcare provider.      Treating Plantar Fasciitis    First, your doctor relieves pain. Then, the cause of your problem may be found and corrected. If your pain is due to poor foot mechanics, custom-made shoe inserts (orthoses) may help.        Reduce Symptoms:  · To relieve mild symptoms, try aspirin, ibuprofen, or other medications as directed. Rubbing ice on the affected area may also help.  · To reduce severe pain and swelling, your doctor may prescribe pills or injections or a walking cast in some instances. Physical therapy, such as ultrasound or a daily stretching program, may also be recommended. Surgery is rarely required.  · To reduce symptoms caused by poor foot mechanics, your foot may be taped. This supports the arch and temporarily controls movement. Night splints may also help by stretching the fascia.    Control Movement  If taping helps, your doctor may prescribe orthoses. Built from plaster casts of your feet, these inserts control the way your foot moves. As a result, your symptoms should go away.  If Surgery Is Needed  Your doctor may consider surgery if other types of treatment don't control your pain. During surgery, the plantar fascia is partially cut to release tension. As you heal, fibrous tissue fills the space between the heel bone and the plantar fascia.   Reduce Overuse  Every time your foot strikes the ground, the plantar fascia is stretched. You can reduce the strain on the plantar fascia and the possibility of overuse by following these suggestions:  · Lose any excess weight.  · Avoid running on hard or  uneven ground.  · Use orthoses at all times in your shoes and house slippers.  © 5573-4208 [employer]. [address] [place] [address]. All rights reserved. This information is not intended as a substitute for professional medical care. Always follow your healthcare professional's instructions.      Lower Body Exercises: Calf Stretch    This exercise both stretches and strengthens your lower body to help your back. Do the exercise as often as suggested by your health care provider. As you work out, dont rush or strain. Use an exercise mat, pillow, or folded towel to protect your knees and other sensitive areas.  · Face a wall 2 feet away. Step toward the wall with one foot.  · Place both palms on the wall and bend your front knee.  · Lean forward, keeping the back leg straight and the heel on the floor.  · Hold for 20 seconds. Switch legs.  © 9979-4331 [employer]. [address] [place] [address]. All rights reserved. This information is not intended as a substitute for professional medical care. Always follow your healthcare professional's instructions.      These instructions are for your right foot. Switch sides for your left foot.  1. Sit in a chair. Rest your right ankle on your left knee.  2. Hold your toes with your right hand. Gently bend the toes backward. Feel a stretch in the undersides of the toes and ball of the foot. Hold for 30 to 60 seconds.  3. Then gently bend the toes in the other direction. Gently press on them until your foot is pointed. Hold for 30 to 60 seconds.  4. Repeat 5 times, or as instructed.  Date Last Reviewed: 5/1/2016  © 4740-8127 [employer]. [address] [place] [address]. All rights reserved. This information is not intended as a substitute for professional medical care. Always follow your healthcare professional's instructions.

## 2018-03-28 NOTE — PROGRESS NOTES
Subjective:      Patient ID: Lynne Bonner is a 37 y.o. female.    Chief Complaint: Foot Pain (heel and arch pain both feet mostly right) and Other Misc (PCP Dr. Perea 03/15/2018 )    Lynne is a 37 y.o. female who presents to the clinic complaining of heel pain in both feet, right worse than left, especially with the first step in the morning and after rest. The pain is described as Aching, Grabbing and Sharp. The onset of the pain was gradual and has worsened over the past several months. Lynne rates the pain as 7/10. She denies a history of trauma. Prior treatments include rest.    Review of Systems   Constitution: Negative for chills and fever.   Cardiovascular: Positive for leg swelling. Negative for claudication.   Respiratory: Negative for shortness of breath.    Skin: Negative for itching, nail changes and rash.   Musculoskeletal: Positive for arthritis. Negative for muscle cramps, muscle weakness and myalgias.        Bilateral heel pain   Gastrointestinal: Negative for nausea and vomiting.   Neurological: Negative for focal weakness, loss of balance, numbness and paresthesias.           Objective:      Physical Exam   Constitutional: She is oriented to person, place, and time. She appears well-developed and well-nourished. No distress.   Cardiovascular:   Pulses:       Dorsalis pedis pulses are 2+ on the right side, and 2+ on the left side.        Posterior tibial pulses are 2+ on the right side, and 2+ on the left side.   < 3 sec capillary refill time to toes 1-5 bilateral. Toes and feet are warm to touch proximally with normal distal cooling b/l. There is some hair growth on the feet and toes b/l. There is no edema b/l. No spider veins or varicosities present b/l.      Musculoskeletal:   Pain on palpation plantar medial and central heel bilateral. No pain with ROM or MMT. Minimal pain with medial and lateral compression of heel. No pain with palpation over the peroneal tendons, PT tendons, or  achilles bilateral.    Equinus noted b/l ankles with < 5 deg DF noted. MMT 5/5 in DF/PF/Inv/Ev resistance with no reproduction of pain in any direction. Passive range of motion of ankle and pedal joints is painless b/l.     Neurological: She is alert and oriented to person, place, and time. She has normal strength. She displays no atrophy and no tremor. No sensory deficit. She exhibits normal muscle tone.   Negative tinel sign bilateral.   Skin: Skin is warm, dry and intact. No abrasion, no bruising, no burn, no ecchymosis, no laceration, no lesion, no petechiae and no rash noted. She is not diaphoretic. No cyanosis or erythema. No pallor. Nails show no clubbing.   Skin temperature, texture and turgor within normal limits.   Psychiatric: She has a normal mood and affect. Her behavior is normal.             Assessment:       Encounter Diagnoses   Name Primary?    Plantar fasciitis of right foot Yes    Plantar fasciitis of left foot     Acquired equinus deformity of both feet          Plan:       Lynne was seen today for foot pain and other misc.    Diagnoses and all orders for this visit:    Plantar fasciitis of right foot    Plantar fasciitis of left foot    Acquired equinus deformity of both feet    Other orders  -     methylPREDNISolone (MEDROL DOSEPACK) 4 mg tablet; use as directed      I counseled the patient on her conditions, their implications and medical management.    Patient will obtain over the counter arch supports and wear them in shoes whenever possible.  Athletic shoes intended for walking or running are usually best. Never walk barefoot or in flats.    Patient will stretch the tendo achilles complex three times daily as demonstrated in the office.  Literature was dispensed illustrating proper stretching technique.    Medrol dose pack for inflammation    If pain persists consider PT, injection, night splints.    Return in 1 month follow up.    Sj Stevens DPM

## 2018-03-28 NOTE — LETTER
March 28, 2018      Mickie Perea MD  [address] [address]           [facility] [address] [facility] [address] [place] [address]  Phone: [phone]          Patient: Lynne Bonner   MR Number: [id number]   YOB: 1980   Date of Visit: 3/28/2018       Dear Dr. Mickie Perea:    Thank you for referring Lynne Bonner to me for evaluation. Attached you will find relevant portions of my assessment and plan of care.    If you have questions, please do not hesitate to call me. I look forward to following Lynne Bonner along with you.    Sincerely,    Sj Stevens, DPTERRANCE    Enclosure  CC:  No Recipients    If you would like to receive this communication electronically, please contact [email or web address] or [phone] to request more information on [facility] access.    For providers and/or their staff who would like to refer a patient to [facility], please contact us through our one-stop-shop provider referral line, [facility], at [phone].    If you feel you have received this communication in error or would no longer like to receive these types of communications, please e-mail [email or web address]

## 2018-04-30 ENCOUNTER — OFFICE VISIT (OUTPATIENT)
Dept: PODIATRY | Facility: CLINIC | Age: 38
End: 2018-04-30
Payer: COMMERCIAL

## 2018-04-30 VITALS — HEIGHT: 65 IN | BODY MASS INDEX: 48.82 KG/M2 | WEIGHT: 293 LBS

## 2018-04-30 DIAGNOSIS — M21.6X1 ACQUIRED EQUINUS DEFORMITY OF BOTH FEET: ICD-10-CM

## 2018-04-30 DIAGNOSIS — M72.2 PLANTAR FASCIITIS OF RIGHT FOOT: Primary | ICD-10-CM

## 2018-04-30 DIAGNOSIS — M21.6X2 ACQUIRED EQUINUS DEFORMITY OF BOTH FEET: ICD-10-CM

## 2018-04-30 DIAGNOSIS — M72.2 PLANTAR FASCIITIS OF LEFT FOOT: ICD-10-CM

## 2018-04-30 PROCEDURE — 99212 OFFICE O/P EST SF 10 MIN: CPT | Mod: S$GLB,,, | Performed by: PODIATRIST

## 2018-04-30 PROCEDURE — 99999 PR PBB SHADOW E&M-EST. PATIENT-LVL III: CPT | Mod: PBBFAC,,, | Performed by: PODIATRIST

## 2018-04-30 NOTE — PROGRESS NOTES
Subjective:      Patient ID: Lynne Bonner is a 37 y.o. female.    Chief Complaint: Follow-up (1 month recheck bilateral foot pain, (-4/16/18))    Lynne is a 37 y.o. female who presents to the clinic complaining of heel pain in both feet, right worse than left, especially with the first step in the morning and after rest. The pain is described as Aching, Grabbing and Sharp. The onset of the pain was gradual and has worsened over the past several months. Lynne rates the pain as 7/10. She denies a history of trauma. Prior treatments include rest.    4/30/18: Patient returns for one month follow up bilateral plantar fasciitis right worse than left. She got a new pair of good New balance tennis shoes with the sofsole inserts. She has been doing her stretching and she took the medrol dose pack. She relates the pain is much better only bothering her now when she gets up after resting for the first few steps. She feels it is about 70% better. No new pedal complaints.    Review of Systems   Constitution: Negative for chills and fever.   Cardiovascular: Positive for leg swelling. Negative for claudication.   Respiratory: Negative for shortness of breath.    Skin: Negative for itching, nail changes and rash.   Musculoskeletal: Positive for arthritis. Negative for muscle cramps, muscle weakness and myalgias.        Bilateral heel pain   Gastrointestinal: Negative for nausea and vomiting.   Neurological: Negative for focal weakness, loss of balance, numbness and paresthesias.           Objective:      Physical Exam   Constitutional: She is oriented to person, place, and time. She appears well-developed and well-nourished. No distress.   Cardiovascular:   Pulses:       Dorsalis pedis pulses are 2+ on the right side, and 2+ on the left side.        Posterior tibial pulses are 2+ on the right side, and 2+ on the left side.   < 3 sec capillary refill time to toes 1-5 bilateral. Toes and feet are warm to  touch proximally with normal distal cooling b/l. There is some hair growth on the feet and toes b/l. There is no edema b/l. No spider veins or varicosities present b/l.      Musculoskeletal:   Minimal pain on palpation plantar medial and central heel bilateral. No pain with ROM or MMT. Minimal pain with medial and lateral compression of heel. No pain with palpation over the peroneal tendons, PT tendons, or achilles bilateral.    Equinus noted b/l ankles with < 5 deg DF noted. MMT 5/5 in DF/PF/Inv/Ev resistance with no reproduction of pain in any direction. Passive range of motion of ankle and pedal joints is painless b/l.     Neurological: She is alert and oriented to person, place, and time. She has normal strength. She displays no atrophy and no tremor. No sensory deficit. She exhibits normal muscle tone.   Negative tinel sign bilateral.   Skin: Skin is warm, dry and intact. No abrasion, no bruising, no burn, no ecchymosis, no laceration, no lesion, no petechiae and no rash noted. She is not diaphoretic. No cyanosis or erythema. No pallor. Nails show no clubbing.   Skin temperature, texture and turgor within normal limits.    Psychiatric: She has a normal mood and affect. Her behavior is normal.             Assessment:       Encounter Diagnoses   Name Primary?    Plantar fasciitis of right foot Yes    Plantar fasciitis of left foot     Acquired equinus deformity of both feet          Plan:       Lynne was seen today for follow-up.    Diagnoses and all orders for this visit:    Plantar fasciitis of right foot    Plantar fasciitis of left foot    Acquired equinus deformity of both feet      I counseled the patient on her conditions, their implications and medical management.    Patient will continue to wear the over the counter arch supports and wear them in shoes whenever possible.  Athletic shoes intended for walking or running are usually best. Never walk barefoot or in flats.    Patient will continue to  stretch the tendo achilles complex three times daily as demonstrated in the office.  Literature was dispensed illustrating proper stretching technique.    Pain is resolving and doing much better with continued treatment it should fully resolve over the next month or so, return If pain persists, discussed possible injection or PT    Return PRN    jS Stevens DPM

## 2018-06-12 PROBLEM — R73.9 BORDERLINE HYPERGLYCEMIA: Status: ACTIVE | Noted: 2018-06-12

## 2018-06-27 ENCOUNTER — TELEPHONE (OUTPATIENT)
Dept: PSYCHIATRY | Facility: CLINIC | Age: 38
End: 2018-06-27

## 2018-06-27 NOTE — TELEPHONE ENCOUNTER
Patient called to let Dr. Harrington know she is going through a divorce. Her  is requesting medical records  Made appointment for tomorrow at 1pm

## 2018-06-28 ENCOUNTER — OFFICE VISIT (OUTPATIENT)
Dept: PSYCHIATRY | Facility: CLINIC | Age: 38
End: 2018-06-28
Payer: COMMERCIAL

## 2018-06-28 VITALS
SYSTOLIC BLOOD PRESSURE: 167 MMHG | HEART RATE: 96 BPM | DIASTOLIC BLOOD PRESSURE: 83 MMHG | WEIGHT: 269.06 LBS | BODY MASS INDEX: 44.78 KG/M2

## 2018-06-28 DIAGNOSIS — F41.1 GAD (GENERALIZED ANXIETY DISORDER): ICD-10-CM

## 2018-06-28 DIAGNOSIS — I10 ESSENTIAL HYPERTENSION: ICD-10-CM

## 2018-06-28 DIAGNOSIS — G47.00 INSOMNIA, UNSPECIFIED TYPE: ICD-10-CM

## 2018-06-28 DIAGNOSIS — F33.41 MDD (MAJOR DEPRESSIVE DISORDER), RECURRENT, IN PARTIAL REMISSION: ICD-10-CM

## 2018-06-28 DIAGNOSIS — E28.2 BILATERAL POLYCYSTIC OVARIAN SYNDROME: ICD-10-CM

## 2018-06-28 DIAGNOSIS — F31.81 BIPOLAR II DISORDER: ICD-10-CM

## 2018-06-28 DIAGNOSIS — G47.33 OSA (OBSTRUCTIVE SLEEP APNEA): Primary | ICD-10-CM

## 2018-06-28 PROBLEM — R53.83 FEELING TIRED: Status: RESOLVED | Noted: 2017-07-17 | Resolved: 2018-06-28

## 2018-06-28 PROBLEM — L30.4 INTERTRIGO: Status: RESOLVED | Noted: 2018-03-12 | Resolved: 2018-06-28

## 2018-06-28 PROBLEM — Z99.89 CPAP (CONTINUOUS POSITIVE AIRWAY PRESSURE) DEPENDENCE: Status: RESOLVED | Noted: 2017-09-21 | Resolved: 2018-06-28

## 2018-06-28 PROBLEM — F90.2 ATTENTION DEFICIT HYPERACTIVITY DISORDER (ADHD), COMBINED TYPE: Status: RESOLVED | Noted: 2017-12-22 | Resolved: 2018-06-28

## 2018-06-28 PROBLEM — R41.840 POOR CONCENTRATION: Status: RESOLVED | Noted: 2017-06-23 | Resolved: 2018-06-28

## 2018-06-28 PROBLEM — R73.9 BORDERLINE HYPERGLYCEMIA: Status: RESOLVED | Noted: 2018-06-12 | Resolved: 2018-06-28

## 2018-06-28 PROCEDURE — 3079F DIAST BP 80-89 MM HG: CPT | Mod: CPTII,S$GLB,, | Performed by: PSYCHIATRY & NEUROLOGY

## 2018-06-28 PROCEDURE — 99214 OFFICE O/P EST MOD 30 MIN: CPT | Mod: S$GLB,,, | Performed by: PSYCHIATRY & NEUROLOGY

## 2018-06-28 PROCEDURE — 99999 PR PBB SHADOW E&M-EST. PATIENT-LVL II: CPT | Mod: PBBFAC,,, | Performed by: PSYCHIATRY & NEUROLOGY

## 2018-06-28 PROCEDURE — 3008F BODY MASS INDEX DOCD: CPT | Mod: CPTII,S$GLB,, | Performed by: PSYCHIATRY & NEUROLOGY

## 2018-06-28 PROCEDURE — 3077F SYST BP >= 140 MM HG: CPT | Mod: CPTII,S$GLB,, | Performed by: PSYCHIATRY & NEUROLOGY

## 2018-06-28 PROCEDURE — 90833 PSYTX W PT W E/M 30 MIN: CPT | Mod: S$GLB,,, | Performed by: PSYCHIATRY & NEUROLOGY

## 2018-06-28 NOTE — PROGRESS NOTES
"ID: 34yo WF w h/o MDD recurrent moderate and SAE. Referred for txmt by therapist Dr.W Roman. Patient stable on prozac 40mg po daily. Last appt diag changed to bipolar II d/o based on sxs report from pt and - started lamictal titration.     CC: anxiety    Interim Hx: presents on time. Chart reviewed. Has not been seen since 12/2017, but earlier this week requested records through med records dept- yesterday called to inform clinic this is due to an impending divorce. Here today for f/u.     Was seen in the interim by a NP at the adhd center. Reports she is no longer taking prozac, "I weaned off of prozac because of some concern about serotonin because they started strattera for adhd and then i'm still taking the lamictal but I think at a higher dose. I'm actually feeling pretty good and and it felt like the anxiety improved with those changes. The main thing is just the divorce." was served on 5/10- reports she had not realized that was even a consideration and now reports  is trying to get full custody of Susy- pt tearful when she reports this. 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 This has been very evident and consistent over course of txmt with me.     She reports she is still living and sleeping with . Unclear when that will change. Reports she "wanted to" come back to my clinic to let me know why records were requested but also because she wants to move txmt back to me b/c at the hearing appt 's atty raised concerns about the credentials of an NP rather than an MD. Hesitant to cont in that office given that reaction. Pt reports reason for seeking outside care was "i just wondered about another opinion."    Share with the pt that I have not considered an adhd diagnosis in the past. Pt was recently on vyvanse through alternate provider and for the diag of BED- " "this was not effective for diet/wgt OR for attention/focus. Now on strattera for a new diag of adhd and pt reports that she has benefitted with attn and focus improved. I am uncertain and court has ordered psych testing which I actually think could be very helpful and the pt has agreed to.     While the divorce is unwanted by the pt it has led to some improvements out of "need"- pt now motivated for work, has lost a significant amount of weight (initially out of grief, but now considering carbs), has been more active. This may lead to a more fulfilling and self sustaining life for this pt.     On Psychiatric ROS:    Endorses chronic difficulty with sleep- improved b/c she is no longer taking naps, improved sleep hygiene, denies anhedonia- engaged with daughter and acitivities, denies feeling helpless/hopeless- "no. Like the opposite. I have to take care of myself", denies dec energy- "i've had to be busy. I have a lot to take care of", stable concentration, dec'd appetite- initially due to disappointment about marriage but now motivated for some additional wgt loss- active in the summer with daughter- has some appropriate weight goals- first is 250lb (20 more), denies dec PMA    Denies thoughts of SI/intent/plan.     Denies feeling more easily overwhelmed, +chronic ruminative thinking (not worse), +feeling tense/"on edge"- "that depends on the day."     PSYCHOTHERAPY ADD-ON   30 (16-37*) minutes    Time: 20 minutes  Participants: Met with patient    Therapeutic Intervention Type: insight oriented psychotherapy, behavior modifying psychotherapy, supportive psychotherapy  Why chosen therapy is appropriate versus another modality: relevant to diagnosis, patient responds to this modality, evidence based practice    Target symptoms: anxiety , adjustment, grief  Primary focus: processing the recent news of divorce, the impending transitions which will occur  Psychotherapeutic techniques: support, validation, " "reframing    Outcome monitoring methods: self-report, observation    Patient's response to intervention:  The patient's response to intervention is accepting, motivated.    Progress toward goals:  The patient's progress toward goals is good.    PPHx: Denies h/o self injury "although I may be injuring myself with food"  inpt psych hospitalization  denies h/o suicide attempt     Current Psych Meds: strattera 60mg po daily, lamictal 200mg po qam  Past Psych Meds: zoloft 100mg (ineffective), effexor 75mg po qam (through PCP, can't recall effect), trazodone (nightmares), wellbutrin xl 150mg (h/a's and tmj), klonopin, ambien (ineffective)    PMHx: PCOS, AGUSTIN/cipap (doesn't wear), chronic sinusitis/deviated septum, morbid obesity    SubstHx:   T- none  E- quit 11/2015 due to migraines; h/o binge drinking in college  D- denies   Caffeine- minimal- sometimes tea    FamPHx: M-anxiety, hoarding; Br- autism spectrum; S- "hot mess; she has anxiety, depression but I think she has a personality d/o", F-PTSD, MDD following MI (2/2 [place] Nam)     Musculoskeletal:  Muscle strength/Tone: no dyskinesia/ no tremor  Gait/Station- non antalgic, no assistance needed    Blood pressure (!) 167/83, pulse 96, weight 122 kg (269 lb 1.1 oz), last menstrual period 06/26/2018.    MSE: appears stated age, well groomed, appropriate dress, morbidly obese- but weight loss is noticeable, engages well with examiner. Good e/c. Speech reg rate and vol, nonpressured. Mood is "i'm ok and considering what's gone on I guess i'm really pretty good." Affect congruent- tearful but able to laugh and smile in appt appropriate to context.  Sensorium fully intact. Oriented to date/day/location, current events. Narrative memory intact. Intellectual function is avg based on vocab and basic fund of knowledge. Thought is c/l/gd. No tangentiality or circumstantiality. No FOI/JULIA. Denies SI/HI. Denies A/VH. No evidence of delusions. Insight fair and Judgment intact. "     Suicide Risk Assessment:   Protective- age, gender, no prior attempts, no prior hospitalizations, no family h/o attempts, no ongoing substance abuse, no psychosis, , has children, denies SI/intent/plan, seeking treatment, access to treatment, future oriented, good primary support, no access to firearms    Risk- race, ongoing Axis I sxs    **Pt is at LOW imminent and long term risk of suicide given current risk factors.    Assessment:  37o WF w h/o MDD recurrent moderate and SAE. Referred for txmt by therapist Dr.W Roman. On eval the pt has a h/o MDD recurrent moderate which is currently in remission on prozac 40mg po qam and SAE which continues. Also a h/o PTSD sxs from childhood bullying which has set a pervasive pattern of self loathing and shame for this pt- now evidencing itself in poor self care- poor attention to overall wellness. BMI >45. No acute safety concerns. Due to cont'd wgt gain her PCP started vyvanse 30mg po qam. Pt has had a 6lb wgt loss and is tolerating well, although bp was elevated at that appt. She reported improved anxiety but mood worsening. Asked the pt to make some dietary changes - she then presented and we called  for collateral- pt reported concern for bipolar II d/o. I am uncertain of this- think sxs more c/w personality structure however she and  motivated for trial of lamictal for txmt of bipolar II depression- pt identifying sxs c/w diag. Pt with limited effort at her own recovery- does not implement health lifestyle changes but is also no longer taking vyvanse. Then reported tolerating lamictal well-now on 200mg- endorsing mood improvement and improved impulsivity. Have cont'd to encourage therapy. Pt with lots of room for beh changes- does not implement. Today presents after 6mos w/o care- transitioned to a NP at [facility] and was diagnoses with ADHD and strattera was started, prozac discontinued. Pt reports good mood and anxiety stability BUT   has filed for divorce. Upsetting but pt handling quite well. Wants to come back to me mainly due to court and my credentials (as opposed to NP)- I have shared my concerns, but she also has psych testing through court and those results will likely be helpful regarding adhd? Certainly some sxs exist and have been consistent throughout txmt- will cont meds w/o change at this time as she is reporting stability DESPITE emotionally charged home environment. does cont therapy at [facility]. RTC 1mo    [place] I: Bipolar II d/o, current depressed; SAE  Axis II: cluster b traits  Axis III: morbid obesity (BMI 47), insulin insensitivity, PCOS, AGUSTIN on cpap  Axis IV: chronic health and mental health concerns  Axis V: GAF 60    Plan:   1. Cont strattera 60mg po daily  2. Cont lamictal 200mg po daily  3. Encouraged the pt to cont to engage in a low carb, high protein diet  4. RTC 1mo    -Spent 30min face to face with the pt; >50% time spent in counseling   -Supportive therapy and psychoeducation provided  -R/B/SE's of medications discussed with the pt who expresses understanding and chooses to take medications as prescribed.   -Pt instructed to call clinic, 911 or go to nearest emergency room if sxs worsen or pt is in   crisis. The pt expresses understanding.

## 2018-07-12 ENCOUNTER — OFFICE VISIT (OUTPATIENT)
Dept: PSYCHIATRY | Facility: CLINIC | Age: 38
End: 2018-07-12
Payer: COMMERCIAL

## 2018-07-12 VITALS
HEART RATE: 93 BPM | SYSTOLIC BLOOD PRESSURE: 151 MMHG | BODY MASS INDEX: 43.91 KG/M2 | HEIGHT: 65 IN | DIASTOLIC BLOOD PRESSURE: 96 MMHG | WEIGHT: 263.56 LBS

## 2018-07-12 DIAGNOSIS — F31.81 BIPOLAR II DISORDER: Primary | ICD-10-CM

## 2018-07-12 DIAGNOSIS — E28.2 BILATERAL POLYCYSTIC OVARIAN SYNDROME: ICD-10-CM

## 2018-07-12 DIAGNOSIS — G47.00 INSOMNIA, UNSPECIFIED TYPE: ICD-10-CM

## 2018-07-12 DIAGNOSIS — Z86.59 HISTORY OF ADHD: ICD-10-CM

## 2018-07-12 DIAGNOSIS — G47.33 OSA (OBSTRUCTIVE SLEEP APNEA): ICD-10-CM

## 2018-07-12 DIAGNOSIS — F41.1 GAD (GENERALIZED ANXIETY DISORDER): ICD-10-CM

## 2018-07-12 PROCEDURE — 90833 PSYTX W PT W E/M 30 MIN: CPT | Mod: S$GLB,,, | Performed by: PSYCHIATRY & NEUROLOGY

## 2018-07-12 PROCEDURE — 99999 PR PBB SHADOW E&M-EST. PATIENT-LVL III: CPT | Mod: PBBFAC,,, | Performed by: PSYCHIATRY & NEUROLOGY

## 2018-07-12 PROCEDURE — 3077F SYST BP >= 140 MM HG: CPT | Mod: CPTII,S$GLB,, | Performed by: PSYCHIATRY & NEUROLOGY

## 2018-07-12 PROCEDURE — 3080F DIAST BP >= 90 MM HG: CPT | Mod: CPTII,S$GLB,, | Performed by: PSYCHIATRY & NEUROLOGY

## 2018-07-12 PROCEDURE — 3008F BODY MASS INDEX DOCD: CPT | Mod: CPTII,S$GLB,, | Performed by: PSYCHIATRY & NEUROLOGY

## 2018-07-12 PROCEDURE — 99214 OFFICE O/P EST MOD 30 MIN: CPT | Mod: S$GLB,,, | Performed by: PSYCHIATRY & NEUROLOGY

## 2018-07-12 RX ORDER — ATOMOXETINE 60 MG/1
60 CAPSULE ORAL DAILY
Qty: 30 CAPSULE | Refills: 0 | Status: SHIPPED | OUTPATIENT
Start: 2018-07-12 | End: 2018-07-30 | Stop reason: SDUPTHER

## 2018-07-12 RX ORDER — LAMOTRIGINE 200 MG/1
200 TABLET ORAL DAILY
Qty: 30 TABLET | Refills: 0 | Status: SHIPPED | OUTPATIENT
Start: 2018-07-12 | End: 2018-07-30 | Stop reason: SDUPTHER

## 2018-07-12 NOTE — PROGRESS NOTES
"ID: 36yo WF w h/o MDD recurrent moderate and SAE. Referred for txmt by therapist Dr.W Roman. Patient stable on prozac 40mg po daily. Last appt diag changed to bipolar II d/o based on sxs report from pt and - started lamictal titration.     CC: anxiety    Interim Hx: presents on time. Chart reviewed.     Pt has brought her medication from previous provider to clarify dosage- is currently taking lamictal 300mg po daily which there is limited EBM to support this dose for the purpose of mood mgmt. I recommend we decrease this dose to 200mg daily which is what I had understood she was taking at her last appt with me-     In addition to the lamictal she is taking strattera 60mg po daily which we will continue today based on pt report that focus/attention and general concentration seem improved on this med. Will cont to evaluate over course of treatment as I am not who initiated this txmt.     Reports that in the last 2 wks, "I'm doing everything I have to do. Keeping up with my legal stuff and my daughter is with me for the most part so we're just staying busy together.", at [facility] this week, but pt remains primary caregiver as dad has a traditional work scheduled- pt has joined a gym with childcare and this has been good.     She does report, "My anxiety has increased from what it was. When this first happened I just got into work mode, like getting everything done that needed to get done and also I think I hoped my  would see that and come around. But now it feels like i'm living under a microscope and answering to people I don't even know and the legal stuff is really overwhelming and I think he's really trying to get full custody which is just more than I can even (pt tearful)"-    Is in weekly marriage counseling with Alonzo at [facility] with johnathan rivero. Also in weekly ind therapy with Mel Faria- also at [facility].     Has a Psychological eval with someone in [place] recommended by the court " "system or attorneys- scheduled for next week.     On Psychiatric ROS:    Endorses "pretty good" sleep- 11-6am recently, improved sleep hygiene, denies anhedonia- engaged with daughter and acitivities, has been swimming more frequently doing some art/creative projects with daughter, denies feeling helpless/hopeless- "no. I really don't have any choice now. I don't like it but it has been good motivation", denies dec energy- "i'm just busy and that helps keep my mind off of it", stable concentration, dec'd appetite- initially due to disappointment about marriage but now motivated for some additional wgt loss- active in the summer with daughter- has some appropriate weight goals- first is 250lb (13 more), denies dec PMA    Denies thoughts of SI/intent/plan.     endorses feeling more easily overwhelmed, +chronic ruminative thinking (not worse), +feeling tense/"on edge"- "that depends on the day or really what's happened legally about the divorce stuff."     PSYCHOTHERAPY ADD-ON   30 (16-37*) minutes    Time: 20 minutes  Participants: Met with patient    Therapeutic Intervention Type: insight oriented psychotherapy, behavior modifying psychotherapy, supportive psychotherapy  Why chosen therapy is appropriate versus another modality: relevant to diagnosis, patient responds to this modality, evidence based practice    Target symptoms: anxiety , adjustment, grief  Primary focus: the impending transitions which will occur, navigating ongoing relationship with  with whom she still lives  Psychotherapeutic techniques: support, validation, reframing    Outcome monitoring methods: self-report, observation    Patient's response to intervention:  The patient's response to intervention is accepting, motivated.    Progress toward goals:  The patient's progress toward goals is good.    PPHx:   Denies h/o self injury   Denies inpt psych hospitalization  Denies h/o suicide attempt     Current Psych Meds: strattera 60mg po daily, " "lamictal 200mg po qam  Past Psych Meds: zoloft 100mg (ineffective), effexor 75mg po qam (through PCP, can't recall effect), trazodone (nightmares), wellbutrin xl 150mg (h/a's and tmj), klonopin, ambien (ineffective), lexapro (yrs ago, can't recall)     PMHx: PCOS, AGUSTIN/cipap (doesn't wear), chronic sinusitis/deviated septum, morbid obesity    SubstHx:   T- none  E- quit 11/2015 due to migraines; h/o binge drinking in college  D- denies   Caffeine- minimal- sometimes tea    FamPHx: M-anxiety, hoarding; Br- autism spectrum; S- "hot mess; she has anxiety, depression but I think she has a personality d/o", F-PTSD, MDD following MI (2/2 Antonio Nam)     Musculoskeletal:  Muscle strength/Tone: no dyskinesia/ no tremor  Gait/Station- non antalgic, no assistance needed    Height 5' 5" (1.651 m), weight 119.6 kg (263 lb 9 oz), last menstrual period 06/26/2018.    MSE: appears stated age, well groomed, appropriate dress, morbidly obese- but weight loss is noticeable, engages well with examiner. Good e/c. Speech reg rate and vol, nonpressured. Mood is "i'm ok today, it just continues to be a little bit of a shock." Affect congruent- tearful but able to laugh and smile in appt appropriate to context.  Does reconstitute. Sensorium fully intact. Oriented to date/day/location, current events. Narrative memory intact. Intellectual function is avg based on vocab and basic fund of knowledge. Thought is c/l/gd. No tangentiality or circumstantiality. No FOI/JULIA. Denies SI/HI. Denies A/VH. No evidence of delusions. Insight fair and Judgment intact and in keeping with insight    Suicide Risk Assessment:   Protective- age, gender, no prior attempts, no prior hospitalizations, no family h/o attempts, no ongoing substance abuse, no psychosis, , has children, denies SI/intent/plan, seeking treatment, access to treatment, future oriented, good primary support, no access to firearms    Risk- race, ongoing Axis I sxs    **Pt is at LOW " imminent and long term risk of suicide given current risk factors.    Assessment:  37o WF w h/o MDD recurrent moderate and SAE. Referred for txmt by therapist Dr.W Roman. On eval the pt has a h/o MDD recurrent moderate which is currently in remission on prozac 40mg po qam and SAE which continues. Also a h/o PTSD sxs from childhood bullying which has set a pervasive pattern of self loathing and shame for this pt- now evidencing itself in poor self care- poor attention to overall wellness. BMI >45. No acute safety concerns. Due to cont'd wgt gain her PCP started vyvanse 30mg po qam. Pt has had a 6lb wgt loss and is tolerating well, although bp was elevated at that appt. She reported improved anxiety but mood worsening. Asked the pt to make some dietary changes - she then presented and we called  for collateral- pt reported concern for bipolar II d/o. I am uncertain of this- think sxs more c/w personality structure however she and  motivated for trial of lamictal for txmt of bipolar II depression- pt identifying sxs c/w diag. Pt with limited effort at her own recovery- does not implement health lifestyle changes but is also no longer taking vyvanse. Then reported tolerating lamictal well-on 200mg- endorsed mood improvement and improved impulsivity. Have cont'd to encourage therapy. Pt with lots of room for beh changes. Last appt presented after 6mos w/o care- transitioned to a NP at [facility] and was diagnosed with ADHD and strattera was started, prozac discontinued. Pt reports good mood and anxiety stability BUT  has filed for divorce. Upsetting but pt handling quite well. Wants to come back to me mainly due to court and my credentials (as opposed to NP)- I have shared my concerns, but she also has psych testing through court and those results will likely be helpful regarding adhd? Certainly some sxs exist and have been consistent throughout txmt- will cont meds w/o change at this time as she is  reporting stability DESPITE emotionally charged home environment. does cont therapy at [facility]. Today on f/u she clarifies previous lamictal dose as 300mg (no ebm to support this dose for mood stability- encouraged to dec back to 200mg), today also reporting inc anxiety in context of legal proceedings and divorce. Will consider addition of ssri mgmt which is treatment of choice for anxiety but also for personality traits contributing to emotional reactivity- will dec lamictal initially and then reassess- will also cont strattera at this time. Now in ind therapy with [facility], marriage therapy at [facility], and has a psych eval in [place] at a court rec'd psychologist (appt next week)- will cont med mgmt. Unfortunate motivator, but pt has really made some much needed behavioral interventions and now has 40lb wgt loss with improved sleep drive/sleep and better energy. Silver lining- she acknowledges these positives. RTC 2wks for discussion of ssri add on-     Axis I: Bipolar II d/o, current depressed; SAE; h/o ADHD  Axis II: cluster b traits  Axis III: morbid obesity (BMI 47), insulin insensitivity, PCOS, AGUSTIN on cpap  Axis IV: chronic health and mental health concerns  Axis V: GAF 60    Plan:   1. Cont strattera 60mg po daily  2. Dec lamictal to 200mg po daily (now on 300mg per outside provider)   3. Encouraged the pt to cont to engage in a low carb, high protein diet, cont exercise  4. Consider starting ssri txmt next appt  5. RTC 2wks    -Spent 30min face to face with the pt; >50% time spent in counseling   -Supportive therapy and psychoeducation provided  -R/B/SE's of medications discussed with the pt who expresses understanding and chooses to take medications as prescribed.   -Pt instructed to call clinic, 911 or go to nearest emergency room if sxs worsen or pt is in   crisis. The pt expresses understanding.

## 2018-07-26 ENCOUNTER — TELEPHONE (OUTPATIENT)
Dept: PSYCHIATRY | Facility: CLINIC | Age: 38
End: 2018-07-26

## 2018-07-26 NOTE — TELEPHONE ENCOUNTER
Patient called states needing a mental health evaluation from Dr. Harrington, states court order.  Will sign a ADDISON giving Dr. Harrington permission to discuss office visits with Phycologist Eugene Yang.    Patient is aware Dr. Harrington will be out of the office till Monday.  Acknowledge understanding. Has follow up appointment Monday July 30th.  Paula

## 2018-07-30 ENCOUNTER — OFFICE VISIT (OUTPATIENT)
Dept: PSYCHIATRY | Facility: CLINIC | Age: 38
End: 2018-07-30
Payer: COMMERCIAL

## 2018-07-30 VITALS
BODY MASS INDEX: 43.76 KG/M2 | SYSTOLIC BLOOD PRESSURE: 145 MMHG | HEIGHT: 65 IN | HEART RATE: 80 BPM | WEIGHT: 262.69 LBS | DIASTOLIC BLOOD PRESSURE: 80 MMHG

## 2018-07-30 DIAGNOSIS — G47.33 OSA (OBSTRUCTIVE SLEEP APNEA): ICD-10-CM

## 2018-07-30 DIAGNOSIS — F31.81 BIPOLAR II DISORDER: Primary | ICD-10-CM

## 2018-07-30 DIAGNOSIS — F39 MOOD DISORDER: ICD-10-CM

## 2018-07-30 DIAGNOSIS — G47.00 INSOMNIA, UNSPECIFIED TYPE: ICD-10-CM

## 2018-07-30 DIAGNOSIS — Z86.59 HISTORY OF ADHD: ICD-10-CM

## 2018-07-30 DIAGNOSIS — F41.1 GAD (GENERALIZED ANXIETY DISORDER): ICD-10-CM

## 2018-07-30 PROCEDURE — 3008F BODY MASS INDEX DOCD: CPT | Mod: CPTII,S$GLB,, | Performed by: PSYCHIATRY & NEUROLOGY

## 2018-07-30 PROCEDURE — 3077F SYST BP >= 140 MM HG: CPT | Mod: CPTII,S$GLB,, | Performed by: PSYCHIATRY & NEUROLOGY

## 2018-07-30 PROCEDURE — 99214 OFFICE O/P EST MOD 30 MIN: CPT | Mod: S$GLB,,, | Performed by: PSYCHIATRY & NEUROLOGY

## 2018-07-30 PROCEDURE — 99999 PR PBB SHADOW E&M-EST. PATIENT-LVL II: CPT | Mod: PBBFAC,,, | Performed by: PSYCHIATRY & NEUROLOGY

## 2018-07-30 PROCEDURE — 90833 PSYTX W PT W E/M 30 MIN: CPT | Mod: S$GLB,,, | Performed by: PSYCHIATRY & NEUROLOGY

## 2018-07-30 PROCEDURE — 3079F DIAST BP 80-89 MM HG: CPT | Mod: CPTII,S$GLB,, | Performed by: PSYCHIATRY & NEUROLOGY

## 2018-07-30 RX ORDER — ATOMOXETINE 60 MG/1
60 CAPSULE ORAL DAILY
Qty: 30 CAPSULE | Refills: 2 | Status: SHIPPED | OUTPATIENT
Start: 2018-07-30 | End: 2018-11-13 | Stop reason: SDUPTHER

## 2018-07-30 RX ORDER — LAMOTRIGINE 200 MG/1
200 TABLET ORAL DAILY
Qty: 30 TABLET | Refills: 2 | Status: SHIPPED | OUTPATIENT
Start: 2018-07-30 | End: 2018-11-13 | Stop reason: SDUPTHER

## 2018-07-30 NOTE — PROGRESS NOTES
"ID: 34yo WF w h/o MDD recurrent moderate and SAE. Referred for txmt by therapist Dr.W Roman. Patient stable on prozac 40mg po daily. Last appt diag changed to bipolar II d/o based on sxs report from pt and - started lamictal titration.     CC: anxiety    Interim Hx: presents on time. Chart reviewed.     Had 2nd  conference last week "and this was sortof my response to the last one, I guess, and it was much better. noone was screaming inappropriately and my new  guided it better and I guess he's more respected because alonzo's  was not nearly as aggressive towards me." reports that it is now part of the "order" to have collateral interviews- Alonzo (), pt's mom, and me were who were named- these are the collateral interviews to help Dr. Euegne Yang, Psy D, in , complete his psych eval which was court ordered- he will now call me to discuss a "collateral interview". I will contact legal as I have never been asked to do this and want to understand more clearly- pt completes an ADDISON.     "so i've actually been doing a little better. i'm sortof coming to terms with it. i'll probably never understand it really so i'm going to try and not focus on that. 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 spousal support not yet assessed b/c they're in the same house- pt needs to find housing in the [facility] [place]. Obviously this is complicated as she can't really look for housing without knowing what she can afford? Needs to be in [place] as Alonzo wants Susy in that school district and the pt continues to hope for "atleast" shared/50-50 custody.     Current situation remains difficult to " "reconcile emotionally as they're in the same house and interacting beautifully as coparents until issues surrouding divorce are mentioned. Went to [facility] together yesterday as a family with Leonel, pt continues to spend every day with Leonel (at time of appt leonel is with maternal grandparents at [facility] [address]) and yet oren is filing for full custody? He works full time and has already completed paperwork for before and after care at [employer] from 7am to 6pm should he be granted custody. Understandably difficult for the pt to understand his intentions. Some of appt spent in therapy surrounding these topics but pt also continues therapy at [facility] weekly.     On Psychiatric ROS:     Endorses "good" sleep- to bed 10:30-6, improved sleep hygiene, denies anhedonia- engaged with daughter and activities, swimming with Leonel frequently this summer, denies feeling helpless/hopeless- "no. I mean, I'm trying to get my life together, looking for housing options which is hard because I don't know what I can afford", denies dec energy- "been ok. i've been focused and doing stuff for the divorce", stable concentration, now stable appetite- trying to limit processed carbs- has some weight loss goals which are reasonable, denies dec PMA    Denies thoughts of SI/intent/plan.     endorses feeling LESS overwhelmed "stuff is just getting more clear and I have sort of a checklist now which i'm following so it seems a little less out of control", +chronic ruminative thinking (not worse), +feeling tense/"on edge"- "it's most tense with the divorce stuff and what's going on with us. Like this weekend he was discussing staying in the house and money and it was tense."     PSYCHOTHERAPY ADD-ON   30 (16-37*) minutes    Time: 20 minutes  Participants: Met with patient    Therapeutic Intervention Type: insight oriented psychotherapy, behavior modifying psychotherapy, supportive psychotherapy  Why chosen therapy is appropriate versus " "another modality: relevant to diagnosis, patient responds to this modality, evidence based practice    Target symptoms: anxiety , adjustment, grief  Primary focus: the impending transitions which will occur, navigating ongoing relationship with  with whom she still lives  Psychotherapeutic techniques: support, validation, reframing    Outcome monitoring methods: self-report, observation    Patient's response to intervention:  The patient's response to intervention is accepting, motivated.    Progress toward goals:  The patient's progress toward goals is good.    PPHx:   Denies h/o self injury   Denies inpt psych hospitalization  Denies h/o suicide attempt     Current Psych Meds: strattera 60mg po daily, lamictal 200mg po qam  Past Psych Meds: zoloft 100mg (ineffective), effexor 75mg po qam (through PCP, can't recall effect), trazodone (nightmares), wellbutrin xl 150mg (h/a's and tmj), klonopin, ambien (ineffective), lexapro (yrs ago, can't recall)     PMHx: PCOS, AGUSTIN/cipap (doesn't wear), chronic sinusitis/deviated septum, morbid obesity    SubstHx:   T- none  E- quit 11/2015 due to migraines; h/o binge drinking in college  D- denies   Caffeine- minimal- sometimes tea    FamPHx: M-anxiety, hoarding; Br- autism spectrum; S- "hot mess; she has anxiety, depression but I think she has a personality d/o", F-PTSD, MDD following MI (2/2 Antonio Nam)     Musculoskeletal:  Muscle strength/Tone: no dyskinesia/ no tremor  Gait/Station- non antalgic, no assistance needed    Blood pressure (!) 145/80, pulse 80, height 5' 5" (1.651 m), weight 119.2 kg (262 lb 10.9 oz), last menstrual period 07/23/2018.    MSE: appears stated age, well groomed, appropriate dress, morbidly obese- but weight loss is noticeable, engages well with examiner. Good e/c. Speech reg rate and vol, nonpressured. Mood is "quite good, actually. Susy and I got school supplies for the first time for kinder yesterday and we had fun this morning together" " Affect congruent- less tearful- today only tearful about the thought of being kept from Susy for longer periods of time, but able to laugh and smile in appt appropriate to context. Does reconstitute appropriately and without direction. Sensorium fully intact. Oriented to date/day/location, current events. Narrative memory intact. Intellectual function is avg based on vocab and basic fund of knowledge. Thought is c/l/gd. No tangentiality or circumstantiality. No FOI/JULIA. Denies SI/HI. Denies A/VH. No evidence of delusions. Insight fair and Judgment intact and in keeping with insight    Suicide Risk Assessment:   Protective- age, gender, no prior attempts, no prior hospitalizations, no family h/o attempts, no ongoing substance abuse, no psychosis, , has children, denies SI/intent/plan, seeking treatment, access to treatment, future oriented, good primary support, no access to firearms    Risk- race, ongoing Axis I sxs    **Pt is at LOW imminent and long term risk of suicide given current risk factors.    Assessment:  37o WF w h/o MDD recurrent moderate and SAE. Referred for txmt by therapist Dr.W Roman. On eval the pt has a h/o MDD recurrent moderate which is currently in remission on prozac 40mg po qam and SAE which continues. Also a h/o PTSD sxs from childhood bullying which has set a pervasive pattern of self loathing and shame for this pt- now evidencing itself in poor self care- poor attention to overall wellness. BMI >45. No acute safety concerns. Due to cont'd wgt gain her PCP started vyvanse 30mg po qam. Pt has had a 6lb wgt loss and is tolerating well, although bp was elevated at that appt. She reported improved anxiety but mood worsening. Asked the pt to make some dietary changes - she then presented and we called  for collateral- pt reported concern for bipolar II d/o. I am uncertain of this- think sxs more c/w personality structure however she and  motivated for trial of lamictal  for txmt of bipolar II depression- pt identifying sxs c/w diag. Pt with limited effort at her own recovery- does not implement health lifestyle changes but is also no longer taking vyvanse. Then reported tolerating lamictal well-on 200mg- endorsed mood improvement and improved impulsivity. Have cont'd to encourage therapy. Pt with lots of room for beh changes. Last appt presented after 6mos w/o care- transitioned to a NP at [facility] and was diagnosed with ADHD and strattera was started, prozac discontinued. Pt reports good mood and anxiety stability BUT  has filed for divorce. Upsetting but pt handling quite well. Wants to come back to me mainly due to court and my credentials (as opposed to NP)- I have shared my concerns, but she also has psych testing through court and those results will likely be helpful regarding adhd? Certainly some sxs exist and have been consistent throughout txmt- will cont meds w/o change at this time as she is reporting stability DESPITE emotionally charged home environment. does cont therapy at [facility]. Today on f/u she clarifies previous lamictal dose as 300mg (no ebm to support this dose for mood stability- encouraged to dec back to 200mg), today also reporting inc anxiety in context of legal proceedings and divorce. Will consider addition of ssri mgmt which is treatment of choice for anxiety but also for personality traits contributing to emotional reactivity- will dec lamictal initially and then reassess- will also cont strattera at this time. Now in ind therapy with [facility], marriage therapy at [facility], and had a psych eval in [place] at a court rec'd psychologist (end of July)- will cont med mgmt. Unfortunate motivator, but pt has really made some much needed behavioral interventions and now has 40lb wgt loss with improved sleep drive/sleep and better energy. Silver lining- she acknowledges these positives and I continue to reflect to the pt that in some regards this experience has forced  her into some self reliance which has led to rushed maturation. She is thinking of future, financial needs and how to best lead life to cont parenting Susy well. Coping so well, in fact, there is no clear need for ssri addition today as previously considered. RTC 1mo-    Axis I: mood disorder nos (mdd in partial remission v bipolar II d/o- per pt/ sx report); SAE; h/o ADHD  Axis II: cluster b traits  Axis III: morbid obesity (BMI 47), insulin insensitivity, PCOS, AGUSTIN on cpap  Axis IV: chronic health and mental health concerns  Axis V: GAF 60    Plan:   1. Cont strattera 60mg po daily  2. Cont lamictal to 200mg po daily  3. Encouraged the pt to cont to engage in a low carb, high protein diet, cont exercise  4. RTC 1mo    -Spent 30min face to face with the pt; >50% time spent in counseling   -Supportive therapy and psychoeducation provided  -R/B/SE's of medications discussed with the pt who expresses understanding and chooses to take medications as prescribed.   -Pt instructed to call clinic, 911 or go to nearest emergency room if sxs worsen or pt is in   crisis. The pt expresses understanding.

## 2018-07-31 PROBLEM — F39 MOOD DISORDER: Status: RESOLVED | Noted: 2017-08-21 | Resolved: 2018-06-28

## 2018-08-08 ENCOUNTER — TELEPHONE (OUTPATIENT)
Dept: PSYCHIATRY | Facility: CLINIC | Age: 38
End: 2018-08-08

## 2018-08-08 NOTE — TELEPHONE ENCOUNTER
Sury with Dr Yang and associates called to get fax number because they need to get medical records from Dr Harrington.  I called the number back and left message for Sury with Dr Arriaza fax # [phone] on voice mail.

## 2018-08-28 ENCOUNTER — OFFICE VISIT (OUTPATIENT)
Dept: PSYCHIATRY | Facility: CLINIC | Age: 38
End: 2018-08-28
Payer: COMMERCIAL

## 2018-08-28 VITALS
HEIGHT: 65 IN | HEART RATE: 91 BPM | DIASTOLIC BLOOD PRESSURE: 91 MMHG | BODY MASS INDEX: 42.95 KG/M2 | WEIGHT: 257.81 LBS | SYSTOLIC BLOOD PRESSURE: 166 MMHG | TEMPERATURE: 99 F

## 2018-08-28 DIAGNOSIS — G47.00 INSOMNIA, UNSPECIFIED TYPE: ICD-10-CM

## 2018-08-28 DIAGNOSIS — F41.1 GAD (GENERALIZED ANXIETY DISORDER): ICD-10-CM

## 2018-08-28 DIAGNOSIS — G47.33 OSA (OBSTRUCTIVE SLEEP APNEA): ICD-10-CM

## 2018-08-28 DIAGNOSIS — F39 MOOD DISORDER: Primary | ICD-10-CM

## 2018-08-28 DIAGNOSIS — I10 ESSENTIAL HYPERTENSION: ICD-10-CM

## 2018-08-28 DIAGNOSIS — Z86.59 HISTORY OF ADHD: ICD-10-CM

## 2018-08-28 PROCEDURE — 3077F SYST BP >= 140 MM HG: CPT | Mod: CPTII,S$GLB,, | Performed by: PSYCHIATRY & NEUROLOGY

## 2018-08-28 PROCEDURE — 99214 OFFICE O/P EST MOD 30 MIN: CPT | Mod: S$GLB,,, | Performed by: PSYCHIATRY & NEUROLOGY

## 2018-08-28 PROCEDURE — 3080F DIAST BP >= 90 MM HG: CPT | Mod: CPTII,S$GLB,, | Performed by: PSYCHIATRY & NEUROLOGY

## 2018-08-28 PROCEDURE — 90833 PSYTX W PT W E/M 30 MIN: CPT | Mod: S$GLB,,, | Performed by: PSYCHIATRY & NEUROLOGY

## 2018-08-28 PROCEDURE — 3008F BODY MASS INDEX DOCD: CPT | Mod: CPTII,S$GLB,, | Performed by: PSYCHIATRY & NEUROLOGY

## 2018-08-28 PROCEDURE — 99999 PR PBB SHADOW E&M-EST. PATIENT-LVL III: CPT | Mod: PBBFAC,,, | Performed by: PSYCHIATRY & NEUROLOGY

## 2018-08-28 NOTE — PROGRESS NOTES
"ID: 34yo WF w h/o MDD recurrent moderate and SAE. Referred for txmt by therapist Dr.W Roman. Patient stable on prozac 40mg po daily. Last appt diag changed to bipolar II d/o based on sxs report from pt and - started lamictal titration.     CC: anxiety    Interim Hx: presents on time. Chart reviewed.     Pt has her eval from Dr.Romano joseline GUILLEN and it is c/w my records which pleased her.  conf was cancelled for yesterday- next item is court date 9/12. Pt does have some concerns regarding 's notes which have some discrepancies and is currently seeing Alonzo alone as independent therapy. He also recommended Alonzo's atty to him and this is in the notes. Pt expresses some concerns about therapy ethics and has expressed this concern to her atty.     "otherwise i'm doing all my other stuff. I really like this book on mindfulness i'm doing with Mel."- looking for rental properties but has to know her spousal support to be able to move forward. Applied to 80 jobs and heard back for 2 [employer] agencies. 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    "i'm just having to keep doing things to move forward, just think of things in steps. Have to find housing, has to be in [employer]'s school district, looking at jobs and i'm open to anything so i've applied to a lot of different things, but I may need to get other training to get the hours and pay I need. So maybe like an accelerated medical program, like LPN or something. I'm really looking into a lot of stuff."      On Psychiatric ROS:     Endorses stable sleep- to bed 10:30-6, improved sleep hygiene, denies anhedonia- engaged with daughter and activities, denies feeling helpless/hopeless- "no- I'm hopeful that all this will come together soon", denies dec energy- "i don't feel well today but otherwise pretty good", stable concentration, now stable appetite- trying to limit processed carbs- has some weight loss goals which are " "reasonable, denies dec PMA    Denies thoughts of SI/intent/plan.     endorses feeling LESS overwhelmed, +chronic ruminative thinking (not worse), +feeling tense/"on edge"- "the divorce stuff"    PSYCHOTHERAPY ADD-ON   30 (16-37*) minutes    Time: 20 minutes  Participants: Met with patient    Therapeutic Intervention Type: insight oriented psychotherapy, behavior modifying psychotherapy, supportive psychotherapy  Why chosen therapy is appropriate versus another modality: relevant to diagnosis, patient responds to this modality, evidence based practice    Target symptoms: anxiety, mood regulation/limiting emotional reactivity  Primary focus: communication and setting boundaries  Psychotherapeutic techniques: support, validation, reframing, eductaion    Outcome monitoring methods: self-report, observation    Patient's response to intervention:  The patient's response to intervention is accepting, motivated.    Progress toward goals:  The patient's progress toward goals is good.    PPHx:   Denies h/o self injury   Denies inpt psych hospitalization  Denies h/o suicide attempt     Current Psych Meds: strattera 60mg po daily, lamictal 200mg po qam  Past Psych Meds: zoloft 100mg (ineffective), effexor 75mg po qam (through PCP, can't recall effect), trazodone (nightmares), wellbutrin xl 150mg (h/a's and tmj), klonopin, ambien (ineffective), lexapro (yrs ago, can't recall)     PMHx: PCOS, AGUSTIN/cipap (doesn't wear), chronic sinusitis/deviated septum, morbid obesity    SubstHx:   T- none  E- quit 11/2015 due to migraines; h/o binge drinking in college  D- denies   Caffeine- minimal- sometimes tea    FamPHx: M-anxiety, hoarding; Br- autism spectrum; S- "hot mess; she has anxiety, depression but I think she has a personality d/o", F-PTSD, MDD following MI (2/2 Antonio Nam)     Musculoskeletal:  Muscle strength/Tone: no dyskinesia/ no tremor  Gait/Station- non antalgic, no assistance needed    Blood pressure (!) 166/91, pulse 91, " "temperature 98.5 °F (36.9 °C), temperature source Oral, height 5' 5" (1.651 m), weight 116.9 kg (257 lb 12.8 oz), last menstrual period 08/13/2018.    MSE: appears stated age, well groomed, appropriate dress, morbidly obese- but weight loss is noticeable, engages well with examiner. Good e/c. Speech reg rate and vol, nonpressured. Mood is "pretty good but i'm sick. But i'm glad this assessment is done and I felt better about it." Affect congruent- less tearful- today only tearful about the thought of being kept from Susy for longer periods of time, but able to laugh and smile in appt appropriate to context. Does reconstitute appropriately and without direction. Sensorium fully intact. Oriented to date/day/location, current events. Narrative memory intact. Intellectual function is avg based on vocab and basic fund of knowledge. Thought is c/l/gd. No tangentiality or circumstantiality. No FOI/JULIA. Denies SI/HI. Denies A/VH. No evidence of delusions. Insight fair and Judgment intact and in keeping with insight    Suicide Risk Assessment:   Protective- age, gender, no prior attempts, no prior hospitalizations, no family h/o attempts, no ongoing substance abuse, no psychosis, , has children, denies SI/intent/plan, seeking treatment, access to treatment, future oriented, good primary support, no access to firearms    Risk- race, ongoing Axis I sxs    **Pt is at LOW imminent and long term risk of suicide given current risk factors.    Assessment:  37o WF w h/o MDD recurrent moderate and SAE. Referred for txmt by therapist Dr.W Roman. On eval the pt has a h/o MDD recurrent moderate which is currently in remission on prozac 40mg po qam and SAE which continues. Also a h/o PTSD sxs from childhood bullying which has set a pervasive pattern of self loathing and shame for this pt- now evidencing itself in poor self care- poor attention to overall wellness. BMI >45. No acute safety concerns. Due to cont'd wgt gain her " PCP started vyvanse 30mg po qam. Pt has had a 6lb wgt loss and is tolerating well, although bp was elevated at that appt. She reported improved anxiety but mood worsening. Asked the pt to make some dietary changes - she then presented and we called  for collateral- pt reported concern for bipolar II d/o. I am uncertain of this- think sxs more c/w personality structure however she and  motivated for trial of lamictal for txmt of bipolar II depression- pt identifying sxs c/w diag. Pt with limited effort at her own recovery- does not implement health lifestyle changes but is also no longer taking vyvanse. Then reported tolerating lamictal well-on 200mg- endorsed mood improvement and improved impulsivity. Have cont'd to encourage therapy. Pt with lots of room for beh changes. Last appt presented after 6mos w/o care- transitioned to a NP at [facility] and was diagnosed with ADHD and strattera was started, prozac discontinued. Pt reports good mood and anxiety stability BUT  has filed for divorce. Upsetting but pt handling quite well. Wants to come back to me mainly due to court and my credentials (as opposed to NP)- I have shared my concerns, but she also has psych testing through court and those results will likely be helpful regarding adhd? Certainly some sxs exist and have been consistent throughout txmt- will cont meds w/o change at this time as she is reporting stability DESPITE emotionally charged home environment. does cont therapy at [facility]. Today on f/u she clarifies previous lamictal dose as 300mg (no ebm to support this dose for mood stability- encouraged to dec back to 200mg), today also reporting inc anxiety in context of legal proceedings and divorce. Will consider addition of ssri mgmt which is treatment of choice for anxiety but also for personality traits contributing to emotional reactivity- will dec lamictal initially and then reassess- will also cont strattera at this time. [place] [facility] [facility], and had a psych eval in [place] at a court rec'd psychologist (end of July)- will cont med mgmt. Unfortunate motivator, but pt has really made some much needed behavioral interventions and now has 40lb wgt loss with improved sleep drive/sleep and better energy. Silver lining- she acknowledges these positives and I continue to reflect to the pt that in some regards this experience has forced her into some self reliance which has led to rushed maturation. She is thinking of future, financial needs and how to best lead life to cont parenting Susy well. Coping well- no clear need for ssri addition as previously considered. Will continue with current med regimen as pt continues to be managing well. RTC 2mos    Axis I: mood disorder nos (mdd in partial remission v bipolar II d/o- per pt/ sx report); SAE; h/o ADHD  Axis II: cluster b traits  Axis III: morbid obesity (BMI 47), insulin insensitivity, PCOS, AGUSTIN on cpap  Axis IV: chronic health and mental health concerns  Axis V: GAF 60    Plan:   1. Cont strattera 60mg po daily  2. Cont lamictal 200mg po daily  3. Encouraged the pt to cont to engage in a low carb, high protein diet, cont exercise  4. RTC 2mos    -Spent 30min face to face with the pt; >50% time spent in counseling   -Supportive therapy and psychoeducation provided  -R/B/SE's of medications discussed with the pt who expresses understanding and chooses to take medications as prescribed.   -Pt instructed to call clinic, 911 or go to nearest emergency room if sxs worsen or pt is in   crisis. The pt expresses understanding.

## 2018-10-22 ENCOUNTER — OFFICE VISIT (OUTPATIENT)
Dept: PSYCHIATRY | Facility: CLINIC | Age: 38
End: 2018-10-22
Payer: COMMERCIAL

## 2018-10-22 VITALS
WEIGHT: 254.81 LBS | HEART RATE: 90 BPM | SYSTOLIC BLOOD PRESSURE: 142 MMHG | BODY MASS INDEX: 42.45 KG/M2 | DIASTOLIC BLOOD PRESSURE: 88 MMHG | HEIGHT: 65 IN

## 2018-10-22 DIAGNOSIS — G47.33 OSA (OBSTRUCTIVE SLEEP APNEA): ICD-10-CM

## 2018-10-22 DIAGNOSIS — I10 ESSENTIAL HYPERTENSION: ICD-10-CM

## 2018-10-22 DIAGNOSIS — Z86.59 HISTORY OF ADHD: ICD-10-CM

## 2018-10-22 DIAGNOSIS — F39 MOOD DISORDER: Primary | ICD-10-CM

## 2018-10-22 DIAGNOSIS — G47.00 INSOMNIA, UNSPECIFIED TYPE: ICD-10-CM

## 2018-10-22 PROCEDURE — 99999 PR PBB SHADOW E&M-EST. PATIENT-LVL III: CPT | Mod: PBBFAC,,, | Performed by: PSYCHIATRY & NEUROLOGY

## 2018-10-22 PROCEDURE — 99214 OFFICE O/P EST MOD 30 MIN: CPT | Mod: S$GLB,,, | Performed by: PSYCHIATRY & NEUROLOGY

## 2018-10-22 PROCEDURE — 3008F BODY MASS INDEX DOCD: CPT | Mod: CPTII,S$GLB,, | Performed by: PSYCHIATRY & NEUROLOGY

## 2018-10-22 PROCEDURE — 90833 PSYTX W PT W E/M 30 MIN: CPT | Mod: S$GLB,,, | Performed by: PSYCHIATRY & NEUROLOGY

## 2018-10-22 PROCEDURE — 3079F DIAST BP 80-89 MM HG: CPT | Mod: CPTII,S$GLB,, | Performed by: PSYCHIATRY & NEUROLOGY

## 2018-10-22 PROCEDURE — 3077F SYST BP >= 140 MM HG: CPT | Mod: CPTII,S$GLB,, | Performed by: PSYCHIATRY & NEUROLOGY

## 2018-10-22 NOTE — PROGRESS NOTES
"ID: 34yo WF w h/o MDD recurrent moderate and SAE. Referred for txmt by therapist Dr.W Roman. Patient stable on prozac 40mg po daily. Last appt diag changed to bipolar II d/o based on sxs report from pt and - started lamictal titration.     CC: anxiety    Interim Hx: presents on time. Chart reviewed.      meetings have been cancelled 2x so 3rd and final is now scheduled for Thurs. Pt anticipates that at this conference "someone will be asked to move out probably."     Pt has been looking for rental options but has no financial ability to know what she can/cannot afford- her atty gave her a "reasonable range" and this is what she has used. Is using a realtor.     Pt recognizes the need for someone to move. She and stated "ex" remain in same home and also continue to be intimate in both physical and emotional regard. They go to [employer] together, run errands together, go to family dinner together weekly, she and alonzo watch tv together nightly, continue having intercourse despite the pt's report that Alonzo has told her his atty has advised him to discontinue this aspect of the relationship. Remain in couples' counseling together. Per pt Alonzo states he cannot afford to move out of house and pay spousal support and child support- it was alonzo who wanted and initiated the divorce and the house title is in pt's name so it remains unclear who would actually move out.     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 This weekend went to [facility] at school as a family (alonzo went), pt (alone) took her to  dance class Saturday morning, cousins bday party, ghost in the [place] in [facility] [place], yesterday hung out with cousins. This morning (monday) dressed her did her hair took her to bus this morning taking her to eye doctor this afternoon and will be with her for evening routine as alonzo is working late out of town. 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 " "    Alonzo asking for every other week custody? "i don't understand it."     Susy continues to do well, brings home "family pictures" - this is the only time in appt when pt is tearful, worries for her daughter's sense of "loss"- I reflect that this is not a surprise that her pictures would continue to have the 3 of them- this is likely not about a "wish" out of her "loss" but for Susy, the family unit is still intact. Based on pt report no dynamics in/out of the home are different.     Has continued to seek out work. Has had some interviews.     On Psychiatric ROS:     Endorses stable sleep- improved sleep hygiene, denies anhedonia- engaged with daughter and activities, denies feeling helpless/hopeless, denies dec energy, stable concentration, now stable appetite- trying to limit processed carbs- has some weight loss goals which are reasonable, denies dec PMA    Denies thoughts of SI/intent/plan.      Endorses feeling LESS overwhelmed, +chronic ruminative thinking (not worse), +feeling tense/"on edge"- "the divorce stuff"    PSYCHOTHERAPY ADD-ON   30 (16-37*) minutes    Time: 20 minutes  Participants: Met with patient    Therapeutic Intervention Type: insight oriented psychotherapy, behavior modifying psychotherapy, supportive psychotherapy  Why chosen therapy is appropriate versus another modality: relevant to diagnosis, patient responds to this modality, evidence based practice    Target symptoms: anxiety, feeling "confused" regarding the state of relationship>> necessary boundary setting  Primary focus: communication and setting boundaries  Psychotherapeutic techniques: support, validation, reframing, eductaion    Outcome monitoring methods: self-report, observation    Patient's response to intervention:  The patient's response to intervention is accepting, motivated.    Progress toward goals:  The patient's progress toward goals is fair    PPHx:   Denies h/o self injury   Denies inpt psych " "hospitalization  Denies h/o suicide attempt     Current Psych Meds: strattera 60mg po daily, lamictal 200mg po qam  Past Psych Meds: zoloft 100mg (ineffective), effexor 75mg po qam (through PCP, can't recall effect), trazodone (nightmares), wellbutrin xl 150mg (h/a's and tmj), klonopin, ambien (ineffective), lexapro (yrs ago, can't recall)     PMHx: PCOS, AGUSTIN/cipap (doesn't wear), chronic sinusitis/deviated septum, morbid obesity    SubstHx:   T- none  E- quit 11/2015 due to migraines; h/o binge drinking in college  D- denies   Caffeine- minimal- sometimes tea    FamPHx: M-anxiety, hoarding; Br- autism spectrum; S- "hot mess; she has anxiety, depression but I think she has a personality d/o", F-PTSD, MDD following MI (2/2 Antonio Nam)     Musculoskeletal:  Muscle strength/Tone: no dyskinesia/ no tremor  Gait/Station- non antalgic, no assistance needed    Blood pressure (!) 142/88, pulse 90, height 5' 5" (1.651 m), weight 115.6 kg (254 lb 12.8 oz), last menstrual period 10/07/2018.    MSE: appears stated age, well groomed, appropriate dress, morbidly obese- but weight loss has been noticeable, engages well with examiner. Good e/c. Speech reg rate and vol, nonpressured. Mood is "pretty good. A little anxious with this meeting coming up on thursday." Affect congruent- less tearful- today only tearful about the thought of Susy's "picture of family" changing and sense of "loss", but able to laugh and smile in appt appropriate to context. Does reconstitute appropriately and without direction. Sensorium fully intact. Oriented to date/day/location, current events. Narrative memory intact. Intellectual function is avg based on vocab and basic fund of knowledge. Thought is c/l/gd. No tangentiality or circumstantiality. No FOI/JULIA. Denies SI/HI. Denies A/VH. No evidence of delusions. Insight fair and Judgment intact and in keeping with insight    Suicide Risk Assessment:   Protective- age, gender, no prior attempts, no prior " hospitalizations, no family h/o attempts, no ongoing substance abuse, no psychosis, , has children, denies SI/intent/plan, seeking treatment, access to treatment, future oriented, good primary support, no access to firearms    Risk- race, ongoing Axis I sxs    **Pt is at LOW imminent and long term risk of suicide given current risk factors.    Assessment:  37o WF w h/o MDD recurrent moderate and SAE. Referred for txmt by therapist Dr.W Roman. On eval the pt has a h/o MDD recurrent moderate which is currently in remission on prozac 40mg po qam and SAE which continues. Also a h/o PTSD sxs from childhood bullying which has set a pervasive pattern of self loathing and shame for this pt- now evidencing itself in poor self care- poor attention to overall wellness. BMI >45. No acute safety concerns. Due to cont'd wgt gain her PCP started vyvanse 30mg po qam. Pt has had a 6lb wgt loss and is tolerating well, although bp was elevated at that appt. She reported improved anxiety but mood worsening. Asked the pt to make some dietary changes - she then presented and we called  for collateral- pt reported concern for bipolar II d/o. I am uncertain of this- think sxs more c/w personality structure however she and  motivated for trial of lamictal for txmt of bipolar II depression- pt identifying sxs c/w diag. Pt with limited effort at her own recovery- does not implement health lifestyle changes but is also no longer taking vyvanse. Then reported tolerating lamictal well-on 200mg- endorsed mood improvement and improved impulsivity. Have cont'd to encourage therapy. Pt with lots of room for beh changes. Last appt presented after 6mos w/o care- transitioned to a NP at [facility] and was diagnosed with ADHD and strattera was started, prozac discontinued. Pt reports good mood and anxiety stability BUT  has filed for divorce. Upsetting but pt handling quite well. Wants to come back to me mainly due to court  and my credentials (as opposed to NP)- I have shared my concerns, but she also has psych testing through court and those results will likely be helpful regarding adhd? Certainly some sxs exist and have been consistent throughout txmt- will cont meds w/o change at this time as she is reporting stability DESPITE emotionally charged home environment. does cont therapy at [facility]. She clarified previous lamictal dose as 300mg (no ebm to support this dose for mood stability- encouraged to dec back to 200mg), alsarpita reported inc anxiety in context of legal proceedings and divorce. Will consider addition of ssri mgmt which is treatment of choice for anxiety but also for personality traits contributing to emotional reactivity- will dec lamictal initially and then reassess- will also cont strattera at this time. Now in [place] therapy with [facility], [place] therapy at [facility], and had a psych eval in [place] at a court rec'd psychologist (end of July)- will cont med mgmt. Unfortunate motivator, but pt has really made some much needed behavioral interventions and now has 40lb wgt loss with improved sleep drive/sleep and better energy. Silver lining- she acknowledges these positives and I continue to reflect to the pt that in some regards this experience has forced her into some self reliance which has led to rushed maturation. She is thinking of future, financial needs and how to best lead life to cont parenting Susy well. Coping well- no clear need for ssri addition as previously considered. Will continue with current med regimen as pt continues to be managing well. RTC 2mos    Axis I: mood disorder nos (mdd in partial remission v bipolar II d/o- per pt/ sx report); SAE; h/o ADHD  Axis II: cluster b traits  Axis III: morbid obesity (BMI 47), insulin insensitivity, PCOS, AGUSTIN on cpap  Axis IV: chronic health and mental health concerns  Axis V: GAF 60    Plan:    1. Cont strattera 60mg po daily  2. Cont lamictal 200mg po daily  3.  Encouraged the pt to cont to engage in a low carb, high protein diet, cont exercise  4. RTC 2mos    -Spent 30min face to face with the pt; >50% time spent in counseling   -Supportive therapy and psychoeducation provided  -R/B/SE's of medications discussed with the pt who expresses understanding and chooses to take medications as prescribed.   -Pt instructed to call clinic, 911 or go to nearest emergency room if sxs worsen or pt is in   crisis. The pt expresses understanding.

## 2018-11-13 ENCOUNTER — OFFICE VISIT (OUTPATIENT)
Dept: PSYCHIATRY | Facility: CLINIC | Age: 38
End: 2018-11-13
Payer: COMMERCIAL

## 2018-11-13 VITALS
SYSTOLIC BLOOD PRESSURE: 150 MMHG | HEART RATE: 103 BPM | DIASTOLIC BLOOD PRESSURE: 97 MMHG | WEIGHT: 248.38 LBS | BODY MASS INDEX: 41.38 KG/M2 | HEIGHT: 65 IN

## 2018-11-13 DIAGNOSIS — F31.81 BIPOLAR II DISORDER: ICD-10-CM

## 2018-11-13 DIAGNOSIS — G47.33 OSA (OBSTRUCTIVE SLEEP APNEA): ICD-10-CM

## 2018-11-13 DIAGNOSIS — I10 ESSENTIAL HYPERTENSION: ICD-10-CM

## 2018-11-13 DIAGNOSIS — G47.00 INSOMNIA, UNSPECIFIED TYPE: ICD-10-CM

## 2018-11-13 DIAGNOSIS — Z86.59 HISTORY OF ADHD: ICD-10-CM

## 2018-11-13 DIAGNOSIS — F39 MOOD DISORDER: Primary | ICD-10-CM

## 2018-11-13 PROCEDURE — 99214 OFFICE O/P EST MOD 30 MIN: CPT | Mod: S$GLB,,, | Performed by: PSYCHIATRY & NEUROLOGY

## 2018-11-13 PROCEDURE — 3080F DIAST BP >= 90 MM HG: CPT | Mod: CPTII,S$GLB,, | Performed by: PSYCHIATRY & NEUROLOGY

## 2018-11-13 PROCEDURE — 90833 PSYTX W PT W E/M 30 MIN: CPT | Mod: S$GLB,,, | Performed by: PSYCHIATRY & NEUROLOGY

## 2018-11-13 PROCEDURE — 3077F SYST BP >= 140 MM HG: CPT | Mod: CPTII,S$GLB,, | Performed by: PSYCHIATRY & NEUROLOGY

## 2018-11-13 PROCEDURE — 99999 PR PBB SHADOW E&M-EST. PATIENT-LVL III: CPT | Mod: PBBFAC,,, | Performed by: PSYCHIATRY & NEUROLOGY

## 2018-11-13 PROCEDURE — 3008F BODY MASS INDEX DOCD: CPT | Mod: CPTII,S$GLB,, | Performed by: PSYCHIATRY & NEUROLOGY

## 2018-11-13 RX ORDER — ATOMOXETINE 60 MG/1
60 CAPSULE ORAL DAILY
Qty: 30 CAPSULE | Refills: 2 | Status: SHIPPED | OUTPATIENT
Start: 2018-11-13 | End: 2018-12-10 | Stop reason: SDUPTHER

## 2018-11-13 RX ORDER — ESCITALOPRAM OXALATE 10 MG/1
10 TABLET ORAL DAILY
Qty: 30 TABLET | Refills: 0 | Status: SHIPPED | OUTPATIENT
Start: 2018-11-13 | End: 2018-11-15 | Stop reason: SDUPTHER

## 2018-11-13 RX ORDER — LAMOTRIGINE 200 MG/1
200 TABLET ORAL DAILY
Qty: 30 TABLET | Refills: 2 | Status: SHIPPED | OUTPATIENT
Start: 2018-11-13 | End: 2018-12-10 | Stop reason: SDUPTHER

## 2018-11-13 NOTE — PROGRESS NOTES
"ID: 36yo WF w h/o MDD recurrent moderate and SAE. Referred for txmt by therapist Dr.W Roman. Patient stable on prozac 40mg po daily. Last appt diag changed to bipolar II d/o based on sxs report from pt and - started lamictal titration.     CC: anxiety    Interim Hx: presents on time. Chart reviewed.     Had hearing mtg thurs 10/26, had last therapy appt on fri 10/27 in which oren said, "it didn't go my way." he stayed angry, Friday night he was confrontational with her about his displeasure with what she said on Friday at therapy with the play therapist. They had a bit of an "argument" then later she heard him in his bathroom slamming doors/drawers, sounds "like he was punching the wall or something and he's not like that. That's not normal for him so I was just concerned and I called the non emergency police number. I didn't know they sent people out but they did and as he left that night he basically was moving out and he looked right at me and said, 'she's taken this divorce to a whole new level.'" pt tearful, "That wasn't my intention. I was just concerned."     Tearful today, has trouble speaking through tears. Now that oren has moved out she doesn't see leonel every day, "and i've seen her every day of her life." as we tease this apart, actually the pt HAS seen Leonel every day and has plans to see her every day for the rest of this week. Pt struggling to see the positive reframing here. "even as the non assigned parent you are doing what you can to be present in her life every single day." also normalize for her that majority of mothers have left their children for a day by the time they are 5- this level of connectedness is compounding the pt's grief.     Pt concerned about decompensation in mood. Inquires today about SSRI mgmt which i've rec'd in the past given her h/o anxiety and emotional reactivity.     On Psychiatric ROS:     Endorses stable sleep- improved sleep hygiene, denies anhedonia- " "engaged with daughter and activities, but now feeling "desperate" without Susy daily, denies feeling helpless/hopeless, denies dec energy, stable concentration, now stable appetite- trying to limit processed carbs- has some weight loss goals which are reasonable- continues losing weight, denies dec PMA    Denies thoughts of SI/intent/plan.      Endorses feeling LESS overwhelmed, +chronic ruminative thinking (not worse), +feeling tense/"on edge"- "the divorce stuff"    PSYCHOTHERAPY ADD-ON   30 (16-37*) minutes    Time: 20 minutes  Participants: Met with patient    Therapeutic Intervention Type: insight oriented psychotherapy, behavior modifying psychotherapy, supportive psychotherapy  Why chosen therapy is appropriate versus another modality: relevant to diagnosis, patient responds to this modality, evidence based practice    Target symptoms: anxiety, grief, adjustment to changes at home  Primary focus: getting present, avoiding fortune telling  Psychotherapeutic techniques: support, validation, reframing, eductaion    Outcome monitoring methods: self-report, observation    Patient's response to intervention:  The patient's response to intervention is accepting, motivated.    Progress toward goals:  The patient's progress toward goals is fair    PPHx:   Denies h/o self injury   Denies inpt psych hospitalization  Denies h/o suicide attempt     Current Psych Meds: strattera 60mg po daily, lamictal 200mg po qam  Past Psych Meds: zoloft 100mg (ineffective), effexor 75mg po qam (through PCP, can't recall effect), trazodone (nightmares), wellbutrin xl 150mg (h/a's and tmj), klonopin, ambien (ineffective), lexapro (yrs ago, can't recall)     PMHx: PCOS, AGUSTIN/cipap (doesn't wear), chronic sinusitis/deviated septum, morbid obesity    SubstHx:   T- none  E- quit 11/2015 due to migraines; h/o binge drinking in college  D- denies   Caffeine- minimal- sometimes tea    FamPHx: M-anxiety, hoarding; Br- autism spectrum; S- "hot " "mess; she has anxiety, depression but I think she has a personality d/o", F-PTSD, MDD following MI (2/2 [facility] Nam)     Musculoskeletal:  Muscle strength/Tone: no dyskinesia/ no tremor  Gait/Station- non antalgic, no assistance needed    Blood pressure (!) 150/97, pulse 103, height 5' 5" (1.651 m), weight 112.7 kg (248 lb 6.4 oz), last menstrual period 10/29/2018.    MSE: appears stated age, well groomed, appropriate dress, morbidly obese- but weight loss has been noticeable, engages well with examiner. Good e/c. Speech reg rate and vol, nonpressured. Mood is "I've just been dealing with not seeing my girl" Affect congruent-tearful throughout, difficult to speak through tears at times. Does reconstitute appropriately with direction. Sensorium fully intact. Oriented to date/day/location, current events. Narrative memory intact. Intellectual function is avg based on vocab and basic fund of knowledge. Thought is c/l/gd. No tangentiality or circumstantiality. No FOI/JULIA. Denies SI/HI. Denies A/VH. No evidence of delusions. Insight fair and Judgment intact and in keeping with insight    Suicide Risk Assessment:   Protective- age, gender, no prior attempts, no prior hospitalizations, no family h/o attempts, no ongoing substance abuse, no psychosis, , has children, denies SI/intent/plan, seeking treatment, access to treatment, future oriented, good primary support, no access to firearms    Risk- race, ongoing Axis I sxs    **Pt is at LOW imminent and long term risk of suicide given current risk factors.    Assessment:  37o WF w h/o MDD recurrent moderate and SAE. Referred for txmt by therapist Dr.W Roman. On eval the pt has a h/o MDD recurrent moderate which is currently in remission on prozac 40mg po qam and SAE which continues. Also a h/o PTSD sxs from childhood bullying which has set a pervasive pattern of self loathing and shame for this pt- now evidencing itself in poor self care- poor attention to overall " wellness. BMI >45. No acute safety concerns. Due to cont'd wgt gain her PCP started vyvanse 30mg po qam. Pt has had a 6lb wgt loss and is tolerating well, although bp was elevated at that appt. She reported improved anxiety but mood worsening. Asked the pt to make some dietary changes - she then presented and we called  for collateral- pt reported concern for bipolar II d/o. I am uncertain of this- think sxs more c/w personality structure however she and  motivated for trial of lamictal for txmt of bipolar II depression- pt identifying sxs c/w diag. Pt with limited effort at her own recovery- does not implement health lifestyle changes but is also no longer taking vyvanse. Then reported tolerating lamictal well-on 200mg- endorsed mood improvement and improved impulsivity. Have cont'd to encourage therapy. Pt with lots of room for beh changes. Last appt presented after 6mos w/o care- transitioned to a NP at [facility] and was diagnosed with ADHD and strattera was started, prozac discontinued. Pt reports good mood and anxiety stability BUT  has filed for divorce. Upsetting but pt handling quite well. Wants to come back to me mainly due to court and my credentials (as opposed to NP)- I have shared my concerns, but she also has psych testing through court and those results will likely be helpful regarding adhd? Certainly some sxs exist and have been consistent throughout txmt- will cont meds w/o change at this time as she is reporting stability DESPITE emotionally charged home environment. [place] [facility] She clarified previous lamictal dose as 300mg (no ebm to support this dose for mood stability- encouraged to dec back to 200mg), alse reported inc anxiety in context of legal proceedings and divorce. Will consider addition of ssri mgmt which is treatment of choice for anxiety but also for personality traits contributing to emotional reactivity- will dec lamictal initially and then  "reassess- will also cont strattera at this time. Now in ind therapy with [facility], [place] therapy at [facility] [place], and had a psych eval in [place] at a court rec'd psychologist (end of July)- will cont med mgmt. Unfortunate motivator, but pt has really made some much needed behavioral interventions and now has 40lb wgt loss with improved sleep drive/sleep and better energy. Silver lining- she acknowledges these positives and I continue to reflect to the pt that in some regards this experience has forced her into some self reliance which has led to rushed maturation. She is thinking of future, financial needs and how to best lead life to cont parenting Susy well. Coping well- no clear need for ssri addition as previously considered. Today the home changes have occurred and led to emotionality. Alonzo has moved out and therefore split time with susy is leading to a great deal of grief- "i've seen her every day of her life until now." tearful- implement some therapy, but will also now start an ssri for emotional reactivity. RTC 1mo    Axis I: mood disorder nos (mdd in partial remission v bipolar II d/o- per pt/ sx report); SAE; h/o ADHD  Axis II: cluster b traits  Axis III: morbid obesity (BMI 47), insulin insensitivity, PCOS, AGUSTIN on cpap  Axis IV: chronic health and mental health concerns  Axis V: GAF 60    Plan:    1. Cont strattera 60mg po daily  2. Cont lamictal 200mg po daily  3. Start trial of lexapro 10mg po qam  4. Encouraged the pt to cont to engage in a low carb, high protein diet, cont exercise  5. RTC 1mo    -Spent 30min face to face with the pt; >50% time spent in counseling   -Supportive therapy and psychoeducation provided  -R/B/SE's of medications discussed with the pt who expresses understanding and chooses to take medications as prescribed.   -Pt instructed to call clinic, 911 or go to nearest emergency room if sxs worsen or pt is in   crisis. The pt expresses understanding.  "

## 2018-12-10 ENCOUNTER — OFFICE VISIT (OUTPATIENT)
Dept: PSYCHIATRY | Facility: CLINIC | Age: 38
End: 2018-12-10
Payer: COMMERCIAL

## 2018-12-10 VITALS
SYSTOLIC BLOOD PRESSURE: 140 MMHG | HEIGHT: 65 IN | BODY MASS INDEX: 41.34 KG/M2 | DIASTOLIC BLOOD PRESSURE: 77 MMHG | WEIGHT: 248.13 LBS | HEART RATE: 86 BPM

## 2018-12-10 DIAGNOSIS — G47.33 OSA (OBSTRUCTIVE SLEEP APNEA): ICD-10-CM

## 2018-12-10 DIAGNOSIS — F41.8 DEPRESSION WITH ANXIETY: ICD-10-CM

## 2018-12-10 DIAGNOSIS — F31.81 BIPOLAR II DISORDER: ICD-10-CM

## 2018-12-10 DIAGNOSIS — Z86.59 HISTORY OF ADHD: ICD-10-CM

## 2018-12-10 DIAGNOSIS — G47.00 INSOMNIA, UNSPECIFIED TYPE: ICD-10-CM

## 2018-12-10 DIAGNOSIS — F39 MOOD DISORDER: Primary | ICD-10-CM

## 2018-12-10 PROCEDURE — 3008F BODY MASS INDEX DOCD: CPT | Mod: CPTII,S$GLB,, | Performed by: PSYCHIATRY & NEUROLOGY

## 2018-12-10 PROCEDURE — 99214 OFFICE O/P EST MOD 30 MIN: CPT | Mod: S$GLB,,, | Performed by: PSYCHIATRY & NEUROLOGY

## 2018-12-10 PROCEDURE — 90833 PSYTX W PT W E/M 30 MIN: CPT | Mod: S$GLB,,, | Performed by: PSYCHIATRY & NEUROLOGY

## 2018-12-10 PROCEDURE — 3077F SYST BP >= 140 MM HG: CPT | Mod: CPTII,S$GLB,, | Performed by: PSYCHIATRY & NEUROLOGY

## 2018-12-10 PROCEDURE — 3078F DIAST BP <80 MM HG: CPT | Mod: CPTII,S$GLB,, | Performed by: PSYCHIATRY & NEUROLOGY

## 2018-12-10 PROCEDURE — 99999 PR PBB SHADOW E&M-EST. PATIENT-LVL III: CPT | Mod: PBBFAC,,, | Performed by: PSYCHIATRY & NEUROLOGY

## 2018-12-10 RX ORDER — ESCITALOPRAM OXALATE 10 MG/1
10 TABLET ORAL DAILY
Qty: 30 TABLET | Refills: 1 | Status: SHIPPED | OUTPATIENT
Start: 2018-12-10 | End: 2019-02-11 | Stop reason: SDUPTHER

## 2018-12-10 RX ORDER — CHOLECALCIFEROL (VITAMIN D3) 25 MCG
3000 TABLET ORAL DAILY
COMMUNITY

## 2018-12-10 RX ORDER — MULTIVITAMIN
1 TABLET ORAL DAILY
COMMUNITY

## 2018-12-10 RX ORDER — ATOMOXETINE 60 MG/1
60 CAPSULE ORAL DAILY
Qty: 30 CAPSULE | Refills: 2 | Status: SHIPPED | OUTPATIENT
Start: 2018-12-10 | End: 2019-02-25 | Stop reason: SDUPTHER

## 2018-12-10 RX ORDER — LAMOTRIGINE 200 MG/1
200 TABLET ORAL DAILY
Qty: 30 TABLET | Refills: 1 | Status: SHIPPED | OUTPATIENT
Start: 2018-12-10 | End: 2019-02-25 | Stop reason: SDUPTHER

## 2018-12-10 NOTE — PROGRESS NOTES
"ID: 34yo WF w h/o MDD recurrent moderate and SAE. Referred for txmt by therapist Dr.W Roman. Patient stable on prozac 40mg po daily. diag changed to bipolar II d/o based on sxs report from pt and - started lamictal titration. Pt then represented mid divorce proceedings. No longer supporting the bipolar ii diag and hedging that perhaps  was over reporting sxs in prep for divorce. Continues lamictal and has been markedly stable through major life change.    CC: anxiety    Interim Hx: presents on time. Chart reviewed.     Had a good weekend with susy. Alonzo has moved into an apartment this past weekend and the pt is glad about this, "i'm ready for susy to have a space that she knows and hopefully before deysi." has Susy on 12/24 at 6m to 12/25 6pm "so that's great."     On the 5 day spreads "without" Susy she figures a way to see her at the school by scheduling volunteer work. Susy is responding well to this and in reality not missing either parent for many days.     Is tolerating the lexapro well, denies s/e other than mild dry mouth which she reports was present prior to the med due to strattera. Believes she has been less emotionally reactive since initiating lexapro.     Reports that Susy had a "meltdown" last thurs morning, regarding playing with a [employer]ael house, "it really wasn't about anything but it was clear to me that it was about all this change and the play therapist said it would happen and it would happen at home which is her safe place but Alonzo was very rude in response and I haven't been able to respond to him because I know i'll be rude back. i'm just trying to work through it before I say anything back."    On Psychiatric ROS:     Endorses stable sleep- improved sleep hygiene, denies anhedonia- engaged with daughter and activities, denies feeling helpless/hopeless, denies dec energy, stable concentration, now stable appetite- trying to limit processed carbs- has some weight loss " "goals which are reasonable- continues losing weight, denies dec PMA    Denies thoughts of SI/intent/plan.      Endorses feeling LESS overwhelmed, +chronic ruminative thinking (not worse), +feeling tense/"on edge"- "the divorce stuff"    PSYCHOTHERAPY ADD-ON   30 (16-37*) minutes    Time: 35 minutes  Participants: Met with patient    Therapeutic Intervention Type: insight oriented psychotherapy, behavior modifying psychotherapy, supportive psychotherapy  Why chosen therapy is appropriate versus another modality: relevant to diagnosis, patient responds to this modality, evidence based practice    Target symptoms: anxiety, grief, adjustment to changes at home  Primary focus: getting present, avoiding fortune telling and/or catastrophic thinking  Psychotherapeutic techniques: support, validation, reframing, eductaion    Outcome monitoring methods: self-report, observation    Patient's response to intervention:  The patient's response to intervention is accepting, motivated.    Progress toward goals:  The patient's progress toward goals is fair    PPHx:   Denies h/o self injury   Denies inpt psych hospitalization  Denies h/o suicide attempt     Current Psych Meds: strattera 60mg po daily, lamictal 200mg po qam  Past Psych Meds: zoloft 100mg (ineffective), effexor 75mg po qam (through PCP, can't recall effect), trazodone (nightmares), wellbutrin xl 150mg (h/a's and tmj), klonopin, ambien (ineffective), lexapro (yrs ago, can't recall)     PMHx: PCOS, AGUSTIN/cipap (doesn't wear), chronic sinusitis/deviated septum, morbid obesity    SubstHx:   T- none  E- quit 11/2015 due to migraines; h/o binge drinking in college  D- denies   Caffeine- minimal- sometimes tea    FamPHx: M-anxiety, hoarding; Br- autism spectrum; S- "hot mess; she has anxiety, depression but I think she has a personality d/o", F-PTSD, MDD following MI (2/2 Antonio Nam)     Musculoskeletal:  Muscle strength/Tone: no dyskinesia/ no tremor  Gait/Station- non antalgic, " "no assistance needed    Blood pressure (!) 140/77, pulse 86, height 5' 5" (1.651 m), weight 112.5 kg (248 lb 1.6 oz), last menstrual period 12/04/2018.    MSE: appears stated age, well groomed, appropriate dress, morbidly obese- but weight loss has been noticeable, engages well with examiner. Good e/c. Speech reg rate and vol, nonpressured. Mood is "I'm a little better. We have court tomorrow so that always keeps me on edge a bit, but I am feeling better in general." Affect congruent-tearful throughout, difficult to speak through tears at times. Does reconstitute appropriately with direction. Sensorium fully intact. Oriented to date/day/location, current events. Narrative memory intact. Intellectual function is avg based on vocab and basic fund of knowledge. Thought is c/l/gd. No tangentiality or circumstantiality. No FOI/JULIA. Denies SI/HI. Denies A/VH. No evidence of delusions. Insight fair and Judgment intact and in keeping with insight    Suicide Risk Assessment:   Protective- age, gender, no prior attempts, no prior hospitalizations, no family h/o attempts, no ongoing substance abuse, no psychosis, , has children, denies SI/intent/plan, seeking treatment, access to treatment, future oriented, good primary support, no access to firearms    Risk- race, ongoing Axis I sxs    **Pt is at LOW imminent and long term risk of suicide given current risk factors.    Assessment:  37o WF w h/o MDD recurrent moderate and SAE. Referred for txmt by therapist Dr.W Roman. On eval the pt has a h/o MDD recurrent moderate which is currently in remission on prozac 40mg po qam and SAE which continues. Also a h/o PTSD sxs from childhood bullying which has set a pervasive pattern of self loathing and shame for this pt- now evidencing itself in poor self care- poor attention to overall wellness. BMI >45. No acute safety concerns. Due to cont'd wgt gain her PCP started vyvanse 30mg po qam. Pt has had a 6lb wgt loss and is " tolerating well, although bp was elevated at that appt. She reported improved anxiety but mood worsening. Asked the pt to make some dietary changes - she then presented and we called  for collateral- pt reported concern for bipolar II d/o. I am uncertain of this- think sxs more c/w personality structure however she and  motivated for trial of lamictal for txmt of bipolar II depression- pt identifying sxs c/w diag. Pt with limited effort at her own recovery- does not implement health lifestyle changes but is also no longer taking vyvanse. Then reported tolerating lamictal well-on 200mg- endorsed mood improvement and improved impulsivity. Have cont'd to encourage therapy. Pt with lots of room for beh changes. Last appt presented after 6mos w/o care- transitioned to a NP at [facility] and was diagnosed with ADHD and strattera was started, prozac discontinued. Pt reports good mood and anxiety stability BUT  has filed for divorce. Upsetting but pt handling quite well. Wants to come back to me mainly due to court and my credentials (as opposed to NP)- I have shared my concerns, but she also has psych testing through court and those results will likely be helpful regarding adhd? Certainly some sxs exist and have been consistent throughout txmt- will cont meds w/o change at this time as she is reporting stability DESPITE emotionally charged home environment. [place] [facility] [place] She clarified previous lamictal dose as 300mg (no ebm to support this dose for mood stability- encouraged to dec back to 200mg), alse reported inc anxiety in context of legal proceedings and divorce. Will consider addition of ssri mgmt which is treatment of choice for anxiety but also for personality traits contributing to emotional reactivity- will dec lamictal initially and then reassess- will also cont strattera at this time. Now in ind therapy with [facility], marriage therapy at [facility], and had a psych eval in [place] at a court  "rec'd psychologist (end of July)- will cont med mgmt. Unfortunate motivator, but pt has really made some much needed behavioral interventions and now has 40lb wgt loss with improved sleep drive/sleep and better energy. Silver lining- she acknowledges these positives and I continue to reflect to the pt that in some regards this experience has forced her into some self reliance which has led to rushed maturation. She is thinking of future, financial needs and how to best lead life to cont parenting Susy well. Coping well- no clear need for ssri addition as previously considered. Today the home changes have occurred and led to emotionality. Alonzo has moved out and therefore split time with susy is leading to a great deal of grief- "i've seen her every day of her life until now." tearful- but less so today- we started lexapro at last appt for emotional reactivity and pt is tolerating it well and it seems to be helpful. RTC 2mo    Axis I: mood disorder nos (mdd in partial remission v bipolar II d/o- per pt/ sx report); SAE; h/o ADHD  Axis II: cluster b traits  Axis III: morbid obesity (BMI 47), insulin insensitivity, PCOS, AGUSTIN on cpap  Axis IV: chronic health and mental health concerns  Axis V: GAF 60    Plan:    1. Cont strattera 60mg po daily  2. Cont lamictal 200mg po daily  3. Cont lexapro 10mg po qam  4. Encouraged the pt to cont to engage in a low carb, high protein diet, cont exercise  5. RTC 2mo    -Spent 30min face to face with the pt; >50% time spent in counseling   -Supportive therapy and psychoeducation provided  -R/B/SE's of medications discussed with the pt who expresses understanding and chooses to take medications as prescribed.   -Pt instructed to call clinic, 911 or go to nearest emergency room if sxs worsen or pt is in   crisis. The pt expresses understanding.  "

## 2019-02-11 DIAGNOSIS — F41.8 DEPRESSION WITH ANXIETY: ICD-10-CM

## 2019-02-12 RX ORDER — ESCITALOPRAM OXALATE 10 MG/1
TABLET ORAL
Qty: 30 TABLET | Refills: 1 | Status: SHIPPED | OUTPATIENT
Start: 2019-02-12 | End: 2019-02-25 | Stop reason: SDUPTHER

## 2019-02-25 ENCOUNTER — OFFICE VISIT (OUTPATIENT)
Dept: PSYCHIATRY | Facility: CLINIC | Age: 39
End: 2019-02-25
Payer: COMMERCIAL

## 2019-02-25 VITALS
SYSTOLIC BLOOD PRESSURE: 134 MMHG | BODY MASS INDEX: 44.48 KG/M2 | HEART RATE: 92 BPM | HEIGHT: 65 IN | WEIGHT: 267 LBS | DIASTOLIC BLOOD PRESSURE: 92 MMHG

## 2019-02-25 DIAGNOSIS — F39 MOOD DISORDER: Primary | ICD-10-CM

## 2019-02-25 DIAGNOSIS — I10 ESSENTIAL HYPERTENSION: ICD-10-CM

## 2019-02-25 DIAGNOSIS — Z86.59 HISTORY OF ADHD: ICD-10-CM

## 2019-02-25 DIAGNOSIS — G47.00 INSOMNIA, UNSPECIFIED TYPE: ICD-10-CM

## 2019-02-25 DIAGNOSIS — G47.33 OSA (OBSTRUCTIVE SLEEP APNEA): ICD-10-CM

## 2019-02-25 DIAGNOSIS — F31.81 BIPOLAR II DISORDER: ICD-10-CM

## 2019-02-25 DIAGNOSIS — F41.8 DEPRESSION WITH ANXIETY: ICD-10-CM

## 2019-02-25 PROCEDURE — 3080F PR MOST RECENT DIASTOLIC BLOOD PRESSURE >= 90 MM HG: ICD-10-PCS | Mod: CPTII,S$GLB,, | Performed by: PSYCHIATRY & NEUROLOGY

## 2019-02-25 PROCEDURE — 90833 PSYTX W PT W E/M 30 MIN: CPT | Mod: S$GLB,,, | Performed by: PSYCHIATRY & NEUROLOGY

## 2019-02-25 PROCEDURE — 99214 OFFICE O/P EST MOD 30 MIN: CPT | Mod: S$GLB,,, | Performed by: PSYCHIATRY & NEUROLOGY

## 2019-02-25 PROCEDURE — 3008F BODY MASS INDEX DOCD: CPT | Mod: CPTII,S$GLB,, | Performed by: PSYCHIATRY & NEUROLOGY

## 2019-02-25 PROCEDURE — 3075F SYST BP GE 130 - 139MM HG: CPT | Mod: CPTII,S$GLB,, | Performed by: PSYCHIATRY & NEUROLOGY

## 2019-02-25 PROCEDURE — 90833 PR PSYCHOTHERAPY W/PATIENT W/E&M, 30 MIN (ADD ON): ICD-10-PCS | Mod: S$GLB,,, | Performed by: PSYCHIATRY & NEUROLOGY

## 2019-02-25 PROCEDURE — 99214 PR OFFICE/OUTPT VISIT, EST, LEVL IV, 30-39 MIN: ICD-10-PCS | Mod: S$GLB,,, | Performed by: PSYCHIATRY & NEUROLOGY

## 2019-02-25 PROCEDURE — 99999 PR PBB SHADOW E&M-EST. PATIENT-LVL III: CPT | Mod: PBBFAC,,, | Performed by: PSYCHIATRY & NEUROLOGY

## 2019-02-25 PROCEDURE — 99999 PR PBB SHADOW E&M-EST. PATIENT-LVL III: ICD-10-PCS | Mod: PBBFAC,,, | Performed by: PSYCHIATRY & NEUROLOGY

## 2019-02-25 PROCEDURE — 3008F PR BODY MASS INDEX (BMI) DOCUMENTED: ICD-10-PCS | Mod: CPTII,S$GLB,, | Performed by: PSYCHIATRY & NEUROLOGY

## 2019-02-25 PROCEDURE — 3080F DIAST BP >= 90 MM HG: CPT | Mod: CPTII,S$GLB,, | Performed by: PSYCHIATRY & NEUROLOGY

## 2019-02-25 PROCEDURE — 3075F PR MOST RECENT SYSTOLIC BLOOD PRESS GE 130-139MM HG: ICD-10-PCS | Mod: CPTII,S$GLB,, | Performed by: PSYCHIATRY & NEUROLOGY

## 2019-02-25 RX ORDER — ATOMOXETINE 60 MG/1
60 CAPSULE ORAL DAILY
Qty: 30 CAPSULE | Refills: 2 | Status: SHIPPED | OUTPATIENT
Start: 2019-02-25 | End: 2019-05-29 | Stop reason: SDUPTHER

## 2019-02-25 RX ORDER — LAMOTRIGINE 200 MG/1
200 TABLET ORAL DAILY
Qty: 30 TABLET | Refills: 2 | Status: SHIPPED | OUTPATIENT
Start: 2019-02-25 | End: 2019-05-29 | Stop reason: SDUPTHER

## 2019-02-25 RX ORDER — ESCITALOPRAM OXALATE 10 MG/1
10 TABLET ORAL DAILY
Qty: 30 TABLET | Refills: 2 | Status: SHIPPED | OUTPATIENT
Start: 2019-02-25 | End: 2019-05-29 | Stop reason: SDUPTHER

## 2019-02-25 RX ORDER — PANTOPRAZOLE SODIUM 40 MG/1
TABLET, DELAYED RELEASE ORAL
COMMUNITY
Start: 2019-02-14 | End: 2020-03-04

## 2019-02-25 NOTE — PROGRESS NOTES
"ID: 34yo WF w h/o MDD recurrent moderate and SAE. Referred for txmt by therapist Dr.W Roman. Patient stable on prozac 40mg po daily. diag changed to bipolar II d/o based on sxs report from pt and - started lamictal titration. Pt then represented mid divorce proceedings. No longer supporting the bipolar ii diag and hedging that perhaps  was over reporting sxs in prep for divorce. Continues lamictal and has been markedly stable through major life change.    CC: anxiety    Interim Hx: presents on time. Chart reviewed.     Has engaged with the . Has her independent eval with her tomorrow.     Pt has restarted school- is taking classes for nursing school. This is how she is spending her days currently- has also kept her busy on the times that she has 5 days away from [facility].     Is applying at [employer], [employer] and [employer] for the assoc program, "so i'm looking forward to having a life with leonel and being able to support us on my own. That feels good. And i'm finding that i'm not nearly as anxious about class as I used to be when I was in school in the past. It's been really good. I don't dread it and I'm liking it."     Continues to be easily tearful regarding time away from leonel and leonel's adjustment to the divorce. leonel struggles to leave mom and "that's just hard to encourage because I don't want her to go either and she has to." she and daughter have been invited [facility] for rhonda gras with other people with children. Looking forward to it.     On Psychiatric ROS:     Endorses stable sleep- improved sleep hygiene, denies anhedonia- engaged with daughter and activities and school, denies feeling helpless/hopeless, denies dec energy, stable concentration, now stable appetite- trying to limit processed carbs- has some weight loss goals which are reasonable- continues losing weight, denies dec PMA    Denies thoughts of SI/intent/plan.      Endorses feeling LESS " "overwhelmed, +chronic ruminative thinking (not worse), +feeling tense/"on edge"- "the divorce stuff"    PSYCHOTHERAPY ADD-ON   30 (16-37*) minutes    Time: 20 minutes  Participants: Met with patient    Therapeutic Intervention Type: insight oriented psychotherapy, behavior modifying psychotherapy, supportive psychotherapy  Why chosen therapy is appropriate versus another modality: relevant to diagnosis, patient responds to this modality, evidence based practice    Target symptoms: anxiety, grief, adjustment to changes at home  Primary focus: getting present, avoiding fortune telling and/or catastrophic thinking  Psychotherapeutic techniques: support, validation, reframing, eductaion    Outcome monitoring methods: self-report, observation    Patient's response to intervention:  The patient's response to intervention is accepting, motivated.    Progress toward goals:  The patient's progress toward goals is fair    PPHx:   Denies h/o self injury   Denies inpt psych hospitalization  Denies h/o suicide attempt     Current Psych Meds: strattera 60mg po daily, lamictal 200mg po qam  Past Psych Meds: zoloft 100mg (ineffective), effexor 75mg po qam (through PCP, can't recall effect), trazodone (nightmares), wellbutrin xl 150mg (h/a's and tmj), klonopin, ambien (ineffective), lexapro (yrs ago, can't recall)     PMHx: PCOS, AGUSTIN/cipap (doesn't wear), chronic sinusitis/deviated septum, morbid obesity    SubstHx:   T- none  E- quit 11/2015 due to migraines; h/o binge drinking in college  D- denies   Caffeine- minimal- sometimes tea    FamPHx: M-anxiety, hoarding; Br- autism spectrum; S- "hot mess; she has anxiety, depression but I think she has a personality d/o", F-PTSD, MDD following MI (2/2 Antonio Nam)     Musculoskeletal:  Muscle strength/Tone: no dyskinesia/ no tremor  Gait/Station- non antalgic, no assistance needed    Blood pressure (!) 134/92, pulse 92, height 5' 5" (1.651 m), weight 121.1 kg (267 lb), last menstrual period " "02/18/2019.    MSE: appears stated age, well groomed, appropriate dress, morbidly obese- but weight loss has been noticeable, engages well with examiner. Good e/c. Speech reg rate and vol, nonpressured. Mood is "pretty good." Affect congruent-tearful but also reconstitutes and themes are future oriented and positive. Sensorium fully intact. Oriented to date/day/location, current events. Narrative memory intact. Intellectual function is avg based on vocab and basic fund of knowledge. Thought is c/l/gd. No tangentiality or circumstantiality. No FOI/JULIA. Denies SI/HI. Denies A/VH. No evidence of delusions. Insight fair and Judgment intact and in keeping with insight    Suicide Risk Assessment:   Protective- age, gender, no prior attempts, no prior hospitalizations, no family h/o attempts, no ongoing substance abuse, no psychosis, , has children, denies SI/intent/plan, seeking treatment, access to treatment, future oriented, good primary support, no access to firearms    Risk- race, ongoing Axis I sxs    **Pt is at LOW imminent and long term risk of suicide given current risk factors.    Assessment:  37o WF w h/o MDD recurrent moderate and SAE. Referred for txmt by therapist Dr.W Roman. On eval the pt has a h/o MDD recurrent moderate which is currently in remission on prozac 40mg po qam and SAE which continues. Also a h/o PTSD sxs from childhood bullying which has set a pervasive pattern of self loathing and shame for this pt- now evidencing itself in poor self care- poor attention to overall wellness. BMI >45. No acute safety concerns. Due to cont'd wgt gain her PCP started vyvanse 30mg po qam. Pt has had a 6lb wgt loss and is tolerating well, although bp was elevated at that appt. She reported improved anxiety but mood worsening. Asked the pt to make some dietary changes - she then presented and we called  for collateral- pt reported concern for bipolar II d/o. I am uncertain of this- think sxs more " c/w personality structure however she and  motivated for trial of lamictal for txmt of bipolar II depression- pt identifying sxs c/w diag. Pt with limited effort at her own recovery- does not implement health lifestyle changes but is also no longer taking vyvanse. Then reported tolerating lamictal well-on 200mg- endorsed mood improvement and improved impulsivity. Have cont'd to encourage therapy. Pt with lots of room for beh changes. Last appt presented after 6mos w/o care- transitioned to a NP at [facility] and was diagnosed with ADHD and strattera was started, prozac discontinued. Pt reports good mood and anxiety stability BUT  has filed for divorce. Upsetting but pt handling quite well. Wants to come back to me mainly due to court and my credentials (as opposed to NP)- I have shared my concerns, but she also has psych testing through court and those results will likely be helpful regarding adhd? Certainly some sxs exist and have been consistent throughout txmt- will cont meds w/o change at this time as she is reporting stability DESPITE emotionally charged home environment. does cont therapy at [facility] [place]. She clarified previous lamictal dose as 300mg (no ebm to support this dose for mood stability- encouraged to dec back to 200mg), alse reported inc anxiety in context of legal proceedings and divorce. Will consider addition of ssri mgmt which is treatment of choice for anxiety but also for personality traits contributing to emotional reactivity- will dec lamictal initially and then reassess- will also cont strattera at this time. [place] [facility] [place] [facility] [place] and [place] a psych eval in [place] at a court rec'd psychologist (end of July)- will cont med mgmt. Unfortunate motivator, but pt has really made some much needed behavioral interventions and now has 40lb wgt loss with improved sleep drive/sleep and better energy. Silver lining- she acknowledges these positives and I continue to  "reflect to the pt that in some regards this experience has forced her into some self reliance which has led to rushed maturation. She is thinking of future, financial needs and how to best lead life to cont parenting Susy well. Coping well- no clear need for ssri addition as previously considered. Today the home changes have occurred and led to emotionality. Alonzo has moved out and therefore split time with susy is leading to a great deal of grief- "i've seen her every day of her life until now." tearful- but less so today- we started lexapro for emotional reactivity and pt is tolerating it well and it seems to be helpful. Has started school for nursing! Huge step- future oriented, themes are positive. RTC 3mos    Axis I: mood disorder nos (mdd in partial remission v bipolar II d/o- per pt/ sx report); SAE; h/o ADHD  Axis II: cluster b traits  Axis III: morbid obesity (BMI 47), insulin insensitivity, PCOS, AGUSTIN on cpap  Axis IV: chronic health and mental health concerns  Axis V: GAF 60    Plan:    1. Cont strattera 60mg po daily  2. Cont lamictal 200mg po daily  3. Cont lexapro 10mg po qam  4. Encouraged the pt to cont to engage in a low carb, high protein diet, cont exercise  5. RTC 3mo    -Spent 30min face to face with the pt; >50% time spent in counseling   -Supportive therapy and psychoeducation provided  -R/B/SE's of medications discussed with the pt who expresses understanding and chooses to take medications as prescribed.   -Pt instructed to call clinic, 911 or go to nearest emergency room if sxs worsen or pt is in   crisis. The pt expresses understanding.  "

## 2019-05-29 ENCOUNTER — OFFICE VISIT (OUTPATIENT)
Dept: PSYCHIATRY | Facility: CLINIC | Age: 39
End: 2019-05-29
Payer: COMMERCIAL

## 2019-05-29 VITALS
SYSTOLIC BLOOD PRESSURE: 145 MMHG | HEART RATE: 101 BPM | DIASTOLIC BLOOD PRESSURE: 97 MMHG | HEIGHT: 65 IN | BODY MASS INDEX: 46.65 KG/M2 | WEIGHT: 280 LBS

## 2019-05-29 DIAGNOSIS — G47.00 INSOMNIA, UNSPECIFIED TYPE: ICD-10-CM

## 2019-05-29 DIAGNOSIS — F31.81 BIPOLAR II DISORDER: ICD-10-CM

## 2019-05-29 DIAGNOSIS — Z86.59 HISTORY OF ADHD: ICD-10-CM

## 2019-05-29 DIAGNOSIS — G47.33 OSA (OBSTRUCTIVE SLEEP APNEA): ICD-10-CM

## 2019-05-29 DIAGNOSIS — E28.2 BILATERAL POLYCYSTIC OVARIAN SYNDROME: ICD-10-CM

## 2019-05-29 DIAGNOSIS — F39 MOOD DISORDER: Primary | ICD-10-CM

## 2019-05-29 DIAGNOSIS — I10 ESSENTIAL HYPERTENSION: ICD-10-CM

## 2019-05-29 DIAGNOSIS — F41.8 DEPRESSION WITH ANXIETY: ICD-10-CM

## 2019-05-29 PROCEDURE — 3008F PR BODY MASS INDEX (BMI) DOCUMENTED: ICD-10-PCS | Mod: CPTII,S$GLB,, | Performed by: PSYCHIATRY & NEUROLOGY

## 2019-05-29 PROCEDURE — 3008F BODY MASS INDEX DOCD: CPT | Mod: CPTII,S$GLB,, | Performed by: PSYCHIATRY & NEUROLOGY

## 2019-05-29 PROCEDURE — 3077F SYST BP >= 140 MM HG: CPT | Mod: CPTII,S$GLB,, | Performed by: PSYCHIATRY & NEUROLOGY

## 2019-05-29 PROCEDURE — 3080F DIAST BP >= 90 MM HG: CPT | Mod: CPTII,S$GLB,, | Performed by: PSYCHIATRY & NEUROLOGY

## 2019-05-29 PROCEDURE — 99999 PR PBB SHADOW E&M-EST. PATIENT-LVL II: CPT | Mod: PBBFAC,,, | Performed by: PSYCHIATRY & NEUROLOGY

## 2019-05-29 PROCEDURE — 90833 PSYTX W PT W E/M 30 MIN: CPT | Mod: S$GLB,,, | Performed by: PSYCHIATRY & NEUROLOGY

## 2019-05-29 PROCEDURE — 99214 OFFICE O/P EST MOD 30 MIN: CPT | Mod: S$GLB,,, | Performed by: PSYCHIATRY & NEUROLOGY

## 2019-05-29 PROCEDURE — 99999 PR PBB SHADOW E&M-EST. PATIENT-LVL II: ICD-10-PCS | Mod: PBBFAC,,, | Performed by: PSYCHIATRY & NEUROLOGY

## 2019-05-29 PROCEDURE — 3080F PR MOST RECENT DIASTOLIC BLOOD PRESSURE >= 90 MM HG: ICD-10-PCS | Mod: CPTII,S$GLB,, | Performed by: PSYCHIATRY & NEUROLOGY

## 2019-05-29 PROCEDURE — 99214 PR OFFICE/OUTPT VISIT, EST, LEVL IV, 30-39 MIN: ICD-10-PCS | Mod: S$GLB,,, | Performed by: PSYCHIATRY & NEUROLOGY

## 2019-05-29 PROCEDURE — 90833 PR PSYCHOTHERAPY W/PATIENT W/E&M, 30 MIN (ADD ON): ICD-10-PCS | Mod: S$GLB,,, | Performed by: PSYCHIATRY & NEUROLOGY

## 2019-05-29 PROCEDURE — 3077F PR MOST RECENT SYSTOLIC BLOOD PRESSURE >= 140 MM HG: ICD-10-PCS | Mod: CPTII,S$GLB,, | Performed by: PSYCHIATRY & NEUROLOGY

## 2019-05-29 RX ORDER — SPINOSAD 9 MG/ML
SUSPENSION TOPICAL
COMMUNITY
Start: 2019-05-28 | End: 2019-09-20 | Stop reason: SDUPTHER

## 2019-05-29 RX ORDER — ESCITALOPRAM OXALATE 10 MG/1
10 TABLET ORAL DAILY
Qty: 30 TABLET | Refills: 2 | Status: SHIPPED | OUTPATIENT
Start: 2019-05-29 | End: 2019-08-19 | Stop reason: SDUPTHER

## 2019-05-29 RX ORDER — LAMOTRIGINE 200 MG/1
200 TABLET ORAL DAILY
Qty: 30 TABLET | Refills: 2 | Status: SHIPPED | OUTPATIENT
Start: 2019-05-29 | End: 2019-08-19 | Stop reason: SDUPTHER

## 2019-05-29 RX ORDER — ATOMOXETINE 60 MG/1
60 CAPSULE ORAL DAILY
Qty: 30 CAPSULE | Refills: 2 | Status: SHIPPED | OUTPATIENT
Start: 2019-05-29 | End: 2019-08-19

## 2019-05-29 NOTE — PROGRESS NOTES
"ID: 34yo WF w h/o MDD recurrent moderate and SAE. Referred for txmt by therapist Dr.W Roman. Patient stable on prozac 40mg po daily. diag changed to bipolar II d/o based on sxs report from pt and - started lamictal titration. Pt then represented mid divorce proceedings. No longer supporting the bipolar ii diag and hedging that perhaps  was over reporting sxs in prep for divorce. Continues lamictal and has been markedly stable through major life change.    CC: anxiety    Interim Hx: presents on time. Chart reviewed.     Is doing well. 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 Excited about this plan for future, "I just don't want to be in this situation again. Dependent on someone. And I like the classes so it's good."     Now that it's summer the schedule with Susy is different which has been a little difficult because despite the court arranged schedule she was still seeing Susy much more b/c of her volunteer work at school.     Has a zoo membership which she's looking forward to using this summer, also sharing a membership with niece/her kids for [place], swimming a lot, "we're just having fun together and the time together now is even more important because it's not every day."     Discuss weight gain- my concerns for this pattern and for her overall health and wellness. Some time spent in motivational interviewing therapy.      On Psychiatric ROS:     Endorses stable sleep- improved sleep hygiene, denies anhedonia- engaged with daughter and activities and school, denies feeling helpless/hopeless, denies dec energy, stable concentration, now stable appetite- trying to limit processed carbs- has some weight loss goals which are reasonable- continues losing weight, denies dec PMA    Denies thoughts of SI/intent/plan.      Endorses feeling LESS overwhelmed, +chronic ruminative thinking (not " "worse), +feeling tense/"on edge"- "the divorce stuff"    PSYCHOTHERAPY ADD-ON   30 (16-37*) minutes    Time: 20 minutes  Participants: Met with patient    Therapeutic Intervention Type: insight oriented psychotherapy, behavior modifying psychotherapy, supportive psychotherapy  Why chosen therapy is appropriate versus another modality: relevant to diagnosis, patient responds to this modality, evidence based practice    Target symptoms: health and wellness  Primary focus: diet, exercise  Psychotherapeutic techniques: support, education, validation, reframing, motivational interviewing    Outcome monitoring methods: self-report, observation, wgt monitoring    Patient's response to intervention:  The patient's response to intervention is accepting, motivated.    Progress toward goals:  The patient's progress toward goals is fair.    PPHx:   Denies h/o self injury   Denies inpt psych hospitalization  Denies h/o suicide attempt     Current Psych Meds: strattera 60mg po daily, lamictal 200mg po qam  Past Psych Meds: zoloft 100mg (ineffective), effexor 75mg po qam (through PCP, can't recall effect), trazodone (nightmares), wellbutrin xl 150mg (h/a's and tmj), klonopin, ambien (ineffective), lexapro (yrs ago, can't recall)     PMHx: PCOS, AGUSTIN/cipap (doesn't wear), chronic sinusitis/deviated septum, morbid obesity    SubstHx:   T- none  E- quit 11/2015 due to migraines; h/o binge drinking in college  D- denies   Caffeine- minimal- sometimes tea    FamPHx: M-anxiety, hoarding; Br- autism spectrum; S- "hot mess; she has anxiety, depression but I think she has a personality d/o", F-PTSD, MDD following MI (2/2 Antonio Nam)     Musculoskeletal:  Muscle strength/Tone: no dyskinesia/ no tremor  Gait/Station- non antalgic, no assistance needed    Blood pressure (!) 145/97, pulse 101, height 5' 5" (1.651 m), weight 127 kg (280 lb), last menstrual period 05/11/2019.    MSE: appears stated age, well groomed, appropriate dress, morbidly " "obese- but weight loss has been noticeable, engages well with examiner. Good e/c. Speech reg rate and vol, nonpressured. Mood is "really good." Affect congruent-tearful about some of the realizations she's made in therapy regarding her marriage, but also reconstitutes and themes are future oriented and positive. Sensorium fully intact. Oriented to date/day/location, current events. Narrative memory intact. Intellectual function is avg based on vocab and basic fund of knowledge. Thought is c/l/gd. No tangentiality or circumstantiality. No FOI/JULIA. Denies SI/HI. Denies A/VH. No evidence of delusions. Insight fair and Judgment intact and in keeping with insight    Suicide Risk Assessment:   Protective- age, gender, no prior attempts, no prior hospitalizations, no family h/o attempts, no ongoing substance abuse, no psychosis, , has children, denies SI/intent/plan, seeking treatment, access to treatment, future oriented, good primary support, no access to firearms    Risk- race, ongoing Axis I sxs    **Pt is at LOW imminent and long term risk of suicide given current risk factors.    Assessment:  37o WF w h/o MDD recurrent moderate and SAE. Referred for txmt by therapist Dr.W Roman. On eval the pt has a h/o MDD recurrent moderate which is currently in remission on prozac 40mg po qam and SAE which continues. Also a h/o PTSD sxs from childhood bullying which has set a pervasive pattern of self loathing and shame for this pt- now evidencing itself in poor self care- poor attention to overall wellness. BMI >45. No acute safety concerns. Due to cont'd wgt gain her PCP started vyvanse 30mg po qam. Pt has had a 6lb wgt loss and is tolerating well, although bp was elevated at that appt. She reported improved anxiety but mood worsening. Asked the pt to make some dietary changes - she then presented and we called  for collateral- pt reported concern for bipolar II d/o. I am uncertain of this- think sxs more c/w " personality structure however she and  motivated for trial of lamictal for txmt of bipolar II depression- pt identifying sxs c/w diag. Pt with limited effort at her own recovery- does not implement health lifestyle changes but is also no longer taking vyvanse. Then reported tolerating lamictal well-on 200mg- endorsed mood improvement and improved impulsivity. Have cont'd to encourage therapy. Pt with lots of room for beh changes. Last appt presented after 6mos w/o care- transitioned to a NP at [facility] and was diagnosed with ADHD and strattera was started, prozac discontinued. Pt reports good mood and anxiety stability BUT  has filed for divorce. Upsetting but pt handling quite well. Wants to come back to me mainly due to court and my credentials (as opposed to NP)- I have shared my concerns, but she also has psych testing through court and those results will likely be helpful regarding adhd? Certainly some sxs exist and have been consistent throughout txmt- will cont meds w/o change at this time as she is reporting stability DESPITE emotionally charged home environment. [place] [facility] [place] She clarified previous lamictal dose as 300mg (no ebm to support this dose for mood stability- encouraged to dec back to 200mg), alse reported inc anxiety in context of legal proceedings and divorce. Will consider addition of ssri mgmt which is treatment of choice for anxiety but also for personality traits contributing to emotional reactivity- will dec lamictal initially and then reassess- will also cont strattera at this time. Now in [place] therapy with [facility], [place] therapy [place] [facility] [place], and had a psych eval in [place] at a court rec'd psychologist (end of July)- will cont med mgmt. Unfortunate motivator, but pt has really made some much needed behavioral interventions and now has 40lb wgt loss with improved sleep drive/sleep and better energy. Silver lining- she acknowledges these positives and I continue to reflect  "to the pt that in some regards this experience has forced her into some self reliance which has led to rushed maturation. She is thinking of future, financial needs and how to best lead life to cont parenting Susy well. Coping well- no clear need for ssri addition as previously considered. Today the home changes have occurred and led to emotionality. Alonzo has moved out and therefore split time with susy is leading to a great deal of grief- "i've seen her every day of her life until now." tearful- but less so today- we started lexapro for emotional reactivity and pt is tolerating it well and it seems to be helpful. 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 future oriented, themes are positive. RTC 3mos    Axis I: mood disorder nos (mdd in partial remission v bipolar II d/o- per pt/ sx report); SAE; h/o ADHD  Axis II: cluster b traits  Axis III: morbid obesity (BMI 47), insulin insensitivity, PCOS, AGUSTIN on cpap  Axis IV: chronic health and mental health concerns  Axis V: GAF 60    Plan:    1. Cont strattera 60mg po daily  2. Cont lamictal 200mg po daily  3. Cont lexapro 10mg po qam  4. Encouraged the pt to cont to engage in a low carb, high protein diet, cont exercise  5. RTC 3mo    -Spent 30min face to face with the pt; >50% time spent in counseling   -Supportive therapy and psychoeducation provided  -R/B/SE's of medications discussed with the pt who expresses understanding and chooses to take medications as prescribed.   -Pt instructed to call clinic, 911 or go to nearest emergency room if sxs worsen or pt is in   crisis. The pt expresses understanding.  "

## 2019-07-03 ENCOUNTER — TELEPHONE (OUTPATIENT)
Dept: PSYCHIATRY | Facility: CLINIC | Age: 39
End: 2019-07-03

## 2019-07-03 NOTE — TELEPHONE ENCOUNTER
ALEX    Pt aware Dr Harrington out until Monday.      She called to advise that she has sent in a medical records release request to [facility] for her records to be released that will need your approval.      She is requesting her psychiatry records for a legal custody eval  to be released to her care.

## 2019-07-03 NOTE — TELEPHONE ENCOUNTER
"Noted. I have given med records the "ok" to release records in the past for this pt and will again if/when asked.   "

## 2019-08-19 ENCOUNTER — OFFICE VISIT (OUTPATIENT)
Dept: PSYCHIATRY | Facility: CLINIC | Age: 39
End: 2019-08-19
Payer: COMMERCIAL

## 2019-08-19 VITALS
BODY MASS INDEX: 47.44 KG/M2 | DIASTOLIC BLOOD PRESSURE: 90 MMHG | WEIGHT: 284.75 LBS | HEIGHT: 65 IN | SYSTOLIC BLOOD PRESSURE: 145 MMHG | HEART RATE: 98 BPM

## 2019-08-19 DIAGNOSIS — F41.8 DEPRESSION WITH ANXIETY: ICD-10-CM

## 2019-08-19 DIAGNOSIS — G47.33 OSA (OBSTRUCTIVE SLEEP APNEA): ICD-10-CM

## 2019-08-19 DIAGNOSIS — F39 MOOD DISORDER: Primary | ICD-10-CM

## 2019-08-19 DIAGNOSIS — G47.00 INSOMNIA, UNSPECIFIED TYPE: ICD-10-CM

## 2019-08-19 DIAGNOSIS — Z86.59 HISTORY OF ADHD: ICD-10-CM

## 2019-08-19 DIAGNOSIS — F31.81 BIPOLAR II DISORDER: ICD-10-CM

## 2019-08-19 PROCEDURE — 99214 PR OFFICE/OUTPT VISIT, EST, LEVL IV, 30-39 MIN: ICD-10-PCS | Mod: S$GLB,,, | Performed by: PSYCHIATRY & NEUROLOGY

## 2019-08-19 PROCEDURE — 3080F DIAST BP >= 90 MM HG: CPT | Mod: CPTII,S$GLB,, | Performed by: PSYCHIATRY & NEUROLOGY

## 2019-08-19 PROCEDURE — 3077F PR MOST RECENT SYSTOLIC BLOOD PRESSURE >= 140 MM HG: ICD-10-PCS | Mod: CPTII,S$GLB,, | Performed by: PSYCHIATRY & NEUROLOGY

## 2019-08-19 PROCEDURE — 3008F BODY MASS INDEX DOCD: CPT | Mod: CPTII,S$GLB,, | Performed by: PSYCHIATRY & NEUROLOGY

## 2019-08-19 PROCEDURE — 99999 PR PBB SHADOW E&M-EST. PATIENT-LVL II: CPT | Mod: PBBFAC,,, | Performed by: PSYCHIATRY & NEUROLOGY

## 2019-08-19 PROCEDURE — 99999 PR PBB SHADOW E&M-EST. PATIENT-LVL II: ICD-10-PCS | Mod: PBBFAC,,, | Performed by: PSYCHIATRY & NEUROLOGY

## 2019-08-19 PROCEDURE — 90833 PR PSYCHOTHERAPY W/PATIENT W/E&M, 30 MIN (ADD ON): ICD-10-PCS | Mod: S$GLB,,, | Performed by: PSYCHIATRY & NEUROLOGY

## 2019-08-19 PROCEDURE — 3008F PR BODY MASS INDEX (BMI) DOCUMENTED: ICD-10-PCS | Mod: CPTII,S$GLB,, | Performed by: PSYCHIATRY & NEUROLOGY

## 2019-08-19 PROCEDURE — 90833 PSYTX W PT W E/M 30 MIN: CPT | Mod: S$GLB,,, | Performed by: PSYCHIATRY & NEUROLOGY

## 2019-08-19 PROCEDURE — 99214 OFFICE O/P EST MOD 30 MIN: CPT | Mod: S$GLB,,, | Performed by: PSYCHIATRY & NEUROLOGY

## 2019-08-19 PROCEDURE — 3077F SYST BP >= 140 MM HG: CPT | Mod: CPTII,S$GLB,, | Performed by: PSYCHIATRY & NEUROLOGY

## 2019-08-19 PROCEDURE — 3080F PR MOST RECENT DIASTOLIC BLOOD PRESSURE >= 90 MM HG: ICD-10-PCS | Mod: CPTII,S$GLB,, | Performed by: PSYCHIATRY & NEUROLOGY

## 2019-08-19 RX ORDER — ONDANSETRON 4 MG/1
TABLET, ORALLY DISINTEGRATING ORAL
COMMUNITY
Start: 2019-08-16 | End: 2021-03-16

## 2019-08-19 RX ORDER — LAMOTRIGINE 200 MG/1
200 TABLET ORAL DAILY
Qty: 30 TABLET | Refills: 2 | Status: SHIPPED | OUTPATIENT
Start: 2019-08-19 | End: 2019-11-14 | Stop reason: SDUPTHER

## 2019-08-19 RX ORDER — SUCRALFATE 1 G/1
TABLET ORAL
COMMUNITY
End: 2020-01-14

## 2019-08-19 RX ORDER — ESCITALOPRAM OXALATE 10 MG/1
10 TABLET ORAL DAILY
Qty: 30 TABLET | Refills: 2 | Status: SHIPPED | OUTPATIENT
Start: 2019-08-19 | End: 2019-11-14 | Stop reason: SDUPTHER

## 2019-08-19 NOTE — PROGRESS NOTES
"ID: 36yo WF w h/o MDD recurrent moderate and SAE. Referred for txmt by therapist Dr.W Roman. Patient stable on prozac 40mg po daily. diag changed to bipolar II d/o based on sxs report from pt and - started lamictal titration. Pt then represented mid divorce proceedings. No longer supporting the bipolar ii diag and hedging that perhaps  was over reporting sxs in prep for divorce. Continues lamictal and has been markedly stable through major life change.    CC: anxiety    Interim Hx: presents on time. Chart reviewed.     Since last appt I have spoken with the custody evaluator- today the pt reports that the recommendation was 50/50 custody but that  got domiciliary which is a surprise to the pt. As soon as recs came out, oren changed his work schedule which necessitates leonel being in aftercare, "it's just frustrating. I can provide that care. i'm home and I'm not charging him for it. I don't want her there. It's just disappointing and I knew it would happen. And now she's on some van that takes her to the aftercare. I don't know who drives the van, I don't know who else is on the van. It's uncomfortable. i'm still her mother and oren thinks these questions are odd. She got a black eye and he didn't know how and he thought I was strange for asking."     "but regarding meds for me. I want to get off the strattera. It's too expensive and not sure that it's helping that much. I also just feel sad. I'm doing things, but i'm really processing that I have to do things that Leonel can't be a part of, but it feels like I'm leaving her behind. I need to develop a life outside of her and I get that, but it's hard."     On Psychiatric ROS:     Endorses stable sleep- improved sleep hygiene, denies anhedonia- engaged with daughter and activities and school, denies feeling helpless/hopeless, denies dec energy, stable concentration, now stable appetite- trying to limit processed carbs- has some weight loss " "goals which are reasonable- continues losing weight, denies dec PMA    Denies thoughts of SI/intent/plan.      Endorses feeling LESS overwhelmed, +chronic ruminative thinking (not worse), +feeling tense/"on edge"- "the divorce stuff"    PSYCHOTHERAPY ADD-ON   30 (16-37*) minutes    Time: 30 minutes  Participants: Met with patient    Therapeutic Intervention Type: insight oriented psychotherapy, behavior modifying psychotherapy, supportive psychotherapy  Why chosen therapy is appropriate versus another modality: relevant to diagnosis, patient responds to this modality, evidence based practice    Target symptoms: health and wellness  Primary focus: diet, exercise  Psychotherapeutic techniques: support, education, validation, reframing, motivational interviewing    Outcome monitoring methods: self-report, observation, wgt monitoring    Patient's response to intervention:  The patient's response to intervention is accepting, motivated.    Progress toward goals:  The patient's progress toward goals is fair.    PPHx:   Denies h/o self injury   Denies inpt psych hospitalization  Denies h/o suicide attempt     Current Psych Meds: strattera 60mg po daily, lamictal 200mg po qam, lexapro 10mg po qam  Past Psych Meds: zoloft 100mg (ineffective), effexor 75mg po qam (through PCP, can't recall effect), trazodone (nightmares), wellbutrin xl 150mg (h/a's and tmj), klonopin, ambien (ineffective), lexapro (yrs ago, can't recall)     PMHx: PCOS, AGUSTIN/cipap (doesn't wear), chronic sinusitis/deviated septum, morbid obesity    SubstHx:   T- none  E- quit 11/2015 due to migraines; h/o binge drinking in college  D- denies   Caffeine- minimal- sometimes tea    FamPHx: M-anxiety, hoarding; Br- autism spectrum; S- "hot mess; she has anxiety, depression but I think she has a personality d/o", F-PTSD, MDD following MI (2/2 [facility] Nam)     Musculoskeletal:  Muscle strength/Tone: no dyskinesia/ no tremor  Gait/Station- non antalgic, no assistance " "needed    Blood pressure (!) 145/90, pulse 98, height 5' 5" (1.651 m), weight 129.2 kg (284 lb 11.6 oz), last menstrual period 08/03/2019.    MSE: appears stated age, well groomed, appropriate dress, morbidly obese- but weight loss has been noticeable, engages well with examiner. Good e/c. Speech reg rate and vol, nonpressured. Mood is "i'm ok." Affect congruent-tearful about some of the realizations she's made in therapy, but also reconstitutes and themes are future oriented and positive. Sensorium fully intact. Oriented to date/day/location, current events. Narrative memory intact. Intellectual function is avg based on vocab and basic fund of knowledge. Thought is c/l/gd. No tangentiality or circumstantiality. No FOI/JULIA. Denies SI/HI. Denies A/VH. No evidence of delusions. Insight fair and Judgment intact and in keeping with insight    Suicide Risk Assessment:   Protective- age, gender, no prior attempts, no prior hospitalizations, no family h/o attempts, no ongoing substance abuse, no psychosis, , has children, denies SI/intent/plan, seeking treatment, access to treatment, future oriented, good primary support, no access to firearms    Risk- race, ongoing Axis I sxs    **Pt is at LOW imminent and long term risk of suicide given current risk factors.    Assessment:  37o WF w h/o MDD recurrent moderate and SAE. Referred for txmt by therapist Dr.W Roman. On eval the pt has a h/o MDD recurrent moderate which is currently in remission on prozac 40mg po qam and SAE which continues. Also a h/o PTSD sxs from childhood bullying which has set a pervasive pattern of self loathing and shame for this pt- now evidencing itself in poor self care- poor attention to overall wellness. BMI >45. No acute safety concerns. Due to cont'd wgt gain her PCP started vyvanse 30mg po qam. Pt has had a 6lb wgt loss and is tolerating well, although bp was elevated at that appt. She reported improved anxiety but mood worsening. Asked " the pt to make some dietary changes - she then presented and we called  for collateral- pt reported concern for bipolar II d/o. I am uncertain of this- think sxs more c/w personality structure however she and  motivated for trial of lamictal for txmt of bipolar II depression- pt identifying sxs c/w diag. Pt with limited effort at her own recovery- does not implement health lifestyle changes but is also no longer taking vyvanse. Then reported tolerating lamictal well-on 200mg- endorsed mood improvement and improved impulsivity. Have cont'd to encourage therapy. Pt with lots of room for beh changes. Last appt presented after 6mos w/o care- transitioned to a NP at [facility] and was diagnosed with ADHD and strattera was started, prozac discontinued. Pt reports good mood and anxiety stability BUT  has filed for divorce. Upsetting but pt handling quite well. Wants to come back to me mainly due to court and my credentials (as opposed to NP)- I have shared my concerns, but she also has psych testing through court and those results will likely be helpful regarding adhd? Certainly some sxs exist and have been consistent throughout txmt- will cont meds w/o change at this time as she is reporting stability DESPITE emotionally charged home environment. does cont therapy at [facility]. She clarified previous lamictal dose as 300mg (no ebm to support this dose for mood stability- encouraged to dec back to 200mg), alse reported inc anxiety in context of legal proceedings and divorce. Will consider addition of ssri mgmt which is treatment of choice for anxiety but also for personality traits contributing to emotional reactivity- will dec lamictal initially and then reassess- will also cont strattera at this time. Now in ind therapy with [facility], marriage therapy at [facility], and had a psych eval in [place] at a court rec'd psychologist (end of July)- will cont med mgmt. Unfortunate motivator, but pt has really made some much  "needed behavioral interventions and now has 40lb wgt loss with improved sleep drive/sleep and better energy. Silver lining- she acknowledges these positives and I continue to reflect to the pt that in some regards this experience has forced her into some self reliance which has led to rushed maturation. She is thinking of future, financial needs and how to best lead life to cont parenting Susy well. Coping well- no clear need for ssri addition as previously considered. Today the home changes have occurred and led to emotionality. Alonzo has moved out and therefore split time with susy is leading to a great deal of grief- "i've seen her every day of her life until now." tearful- but less so today- we started lexapro for emotional reactivity and pt is tolerating it well and it seems to be helpful. 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 future oriented, themes are positive. RTC 2mos    Axis I: mood disorder nos (mdd in partial remission v bipolar II d/o- per pt/ sx report); SAE; h/o ADHD  Axis II: cluster b traits  Axis III: morbid obesity (BMI 47), insulin insensitivity, PCOS, AGUSTIN on cpap  Axis IV: chronic health and mental health concerns  Axis V: GAF 60    Plan:    1. Stop strattera  2. Cont lamictal 200mg po daily  3. cont lexapro 10mg po qam  4. Encouraged the pt to cont to engage in a low carb, high protein diet, cont exercise  5. RTC 2mo    -Spent 30min face to face with the pt; >50% time spent in counseling   -Supportive therapy and psychoeducation provided  -R/B/SE's of medications discussed with the pt who expresses understanding and chooses to take medications as prescribed.   -Pt instructed to call clinic, 911 or go to nearest emergency room if sxs worsen or pt is in   crisis. The pt expresses understanding.  "

## 2019-09-25 PROBLEM — G47.31 PRIMARY CENTRAL SLEEP APNEA: Status: ACTIVE | Noted: 2019-09-25

## 2019-09-25 PROBLEM — K21.9 GASTROESOPHAGEAL REFLUX DISEASE: Status: ACTIVE | Noted: 2019-09-25

## 2019-09-25 PROBLEM — Z86.59 HISTORY OF ADHD: Status: RESOLVED | Noted: 2018-07-12 | Resolved: 2019-09-25

## 2019-09-25 PROBLEM — I10 ESSENTIAL HYPERTENSION: Status: RESOLVED | Noted: 2018-03-12 | Resolved: 2019-09-25

## 2019-09-25 PROBLEM — F39 MOOD DISORDER: Status: RESOLVED | Noted: 2017-08-21 | Resolved: 2019-09-25

## 2019-09-25 PROBLEM — D50.9 MICROCYTIC ANEMIA: Status: RESOLVED | Noted: 2017-07-17 | Resolved: 2019-09-25

## 2019-11-14 ENCOUNTER — OFFICE VISIT (OUTPATIENT)
Dept: PSYCHIATRY | Facility: CLINIC | Age: 39
End: 2019-11-14
Payer: COMMERCIAL

## 2019-11-14 VITALS
SYSTOLIC BLOOD PRESSURE: 133 MMHG | BODY MASS INDEX: 47.76 KG/M2 | WEIGHT: 286.69 LBS | HEIGHT: 65 IN | DIASTOLIC BLOOD PRESSURE: 89 MMHG | HEART RATE: 96 BPM

## 2019-11-14 DIAGNOSIS — F31.81 BIPOLAR II DISORDER: ICD-10-CM

## 2019-11-14 DIAGNOSIS — G47.33 OSA (OBSTRUCTIVE SLEEP APNEA): ICD-10-CM

## 2019-11-14 DIAGNOSIS — F41.1 GAD (GENERALIZED ANXIETY DISORDER): ICD-10-CM

## 2019-11-14 DIAGNOSIS — F41.8 DEPRESSION WITH ANXIETY: ICD-10-CM

## 2019-11-14 DIAGNOSIS — F33.1 MDD (MAJOR DEPRESSIVE DISORDER), RECURRENT EPISODE, MODERATE: Primary | ICD-10-CM

## 2019-11-14 DIAGNOSIS — E28.2 PCOS (POLYCYSTIC OVARIAN SYNDROME): ICD-10-CM

## 2019-11-14 PROCEDURE — 99999 PR PBB SHADOW E&M-EST. PATIENT-LVL II: ICD-10-PCS | Mod: PBBFAC,,, | Performed by: PSYCHIATRY & NEUROLOGY

## 2019-11-14 PROCEDURE — 99214 OFFICE O/P EST MOD 30 MIN: CPT | Mod: S$GLB,,, | Performed by: PSYCHIATRY & NEUROLOGY

## 2019-11-14 PROCEDURE — 3079F DIAST BP 80-89 MM HG: CPT | Mod: CPTII,S$GLB,, | Performed by: PSYCHIATRY & NEUROLOGY

## 2019-11-14 PROCEDURE — 90833 PSYTX W PT W E/M 30 MIN: CPT | Mod: S$GLB,,, | Performed by: PSYCHIATRY & NEUROLOGY

## 2019-11-14 PROCEDURE — 3075F SYST BP GE 130 - 139MM HG: CPT | Mod: CPTII,S$GLB,, | Performed by: PSYCHIATRY & NEUROLOGY

## 2019-11-14 PROCEDURE — 3075F PR MOST RECENT SYSTOLIC BLOOD PRESS GE 130-139MM HG: ICD-10-PCS | Mod: CPTII,S$GLB,, | Performed by: PSYCHIATRY & NEUROLOGY

## 2019-11-14 PROCEDURE — 99999 PR PBB SHADOW E&M-EST. PATIENT-LVL II: CPT | Mod: PBBFAC,,, | Performed by: PSYCHIATRY & NEUROLOGY

## 2019-11-14 PROCEDURE — 3079F PR MOST RECENT DIASTOLIC BLOOD PRESSURE 80-89 MM HG: ICD-10-PCS | Mod: CPTII,S$GLB,, | Performed by: PSYCHIATRY & NEUROLOGY

## 2019-11-14 PROCEDURE — 99214 PR OFFICE/OUTPT VISIT, EST, LEVL IV, 30-39 MIN: ICD-10-PCS | Mod: S$GLB,,, | Performed by: PSYCHIATRY & NEUROLOGY

## 2019-11-14 PROCEDURE — 3008F BODY MASS INDEX DOCD: CPT | Mod: CPTII,S$GLB,, | Performed by: PSYCHIATRY & NEUROLOGY

## 2019-11-14 PROCEDURE — 90833 PR PSYCHOTHERAPY W/PATIENT W/E&M, 30 MIN (ADD ON): ICD-10-PCS | Mod: S$GLB,,, | Performed by: PSYCHIATRY & NEUROLOGY

## 2019-11-14 PROCEDURE — 3008F PR BODY MASS INDEX (BMI) DOCUMENTED: ICD-10-PCS | Mod: CPTII,S$GLB,, | Performed by: PSYCHIATRY & NEUROLOGY

## 2019-11-14 RX ORDER — ESCITALOPRAM OXALATE 20 MG/1
20 TABLET ORAL DAILY
Qty: 90 TABLET | Refills: 0 | Status: SHIPPED | OUTPATIENT
Start: 2019-11-14 | End: 2020-01-14 | Stop reason: SDUPTHER

## 2019-11-14 RX ORDER — LAMOTRIGINE 200 MG/1
200 TABLET ORAL DAILY
Qty: 90 TABLET | Refills: 0 | Status: SHIPPED | OUTPATIENT
Start: 2019-11-14 | End: 2020-01-14 | Stop reason: SDUPTHER

## 2019-11-14 NOTE — PROGRESS NOTES
"ID: 36yo WF w h/o MDD recurrent moderate and SAE. Referred for txmt by therapist Dr.W Roman. Patient stable on prozac 40mg po daily. diag changed to bipolar II d/o based on sxs report from pt and - started lamictal titration. Pt then represented mid divorce proceedings. No longer supporting the bipolar ii diag and hedging that perhaps  was over reporting sxs in prep for divorce. Continues lamictal and has been markedly stable through major life change.    CC: anxiety    Interim Hx: presents on time. Chart reviewed.     Pt doing well. The custody proceeding continues.  There will be an additional court date for custody. He is now seeking injunction for harrassment. Calling her presence at events of daughter's "harrassment"- "that's not harrassment. 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 16mos accelerated and then there's a bridge program at [facility] to RN.     Does feel that anxiety has been up and this leads to "just some low moments and I wondered about increasing the lexapro"- is currently on 10mg but is encouraged to also lean towards adding in exercise- 20mins, 3 days/wk as additional anxiety/mood support.     Will inc lexapro to 20mg po daily.    Did go get evaluated for sleep apnea and now using cipap- just got the mask adjusted properly- will cont to assess sx improvement.     On Psychiatric ROS:     Endorses stable sleep- improved sleep hygiene, denies anhedonia- engaged with daughter and activities and school, denies feeling helpless/hopeless, denies dec energy, stable concentration, now stable appetite- trying to limit processed carbs- has some weight loss goals which are reasonable- continues losing weight, denies dec PMA    Denies thoughts of SI/intent/plan.      Endorses feeling LESS overwhelmed, +chronic ruminative thinking (not worse), +feeling tense/"on edge"- " ""the divorce stuff"    PSYCHOTHERAPY ADD-ON   30 (16-37*) minutes    Time: 30 minutes  Participants: Met with patient    Therapeutic Intervention Type: insight oriented psychotherapy, behavior modifying psychotherapy, supportive psychotherapy  Why chosen therapy is appropriate versus another modality: relevant to diagnosis, patient responds to this modality, evidence based practice    Target symptoms: health and wellness  Primary focus: diet, exercise  Psychotherapeutic techniques: support, education, validation, reframing, motivational interviewing    Outcome monitoring methods: self-report, observation, wgt monitoring    Patient's response to intervention:  The patient's response to intervention is accepting, motivated.    Progress toward goals:  The patient's progress toward goals is fair.    PPHx:   Denies h/o self injury   Denies inpt psych hospitalization  Denies h/o suicide attempt     Current Psych Meds: strattera 60mg po daily, lamictal 200mg po qam, lexapro 10mg po qam  Past Psych Meds: zoloft 100mg (ineffective), effexor 75mg po qam (through PCP, can't recall effect), trazodone (nightmares), wellbutrin xl 150mg (h/a's and tmj), klonopin, ambien (ineffective), lexapro (yrs ago, can't recall)     PMHx: PCOS, AGUSTIN/cipap (doesn't wear), chronic sinusitis/deviated septum, morbid obesity    SubstHx:   T- none  E- quit 11/2015 due to migraines; h/o binge drinking in college  D- denies   Caffeine- minimal- sometimes tea    FamPHx: M-anxiety, hoarding; Br- autism spectrum; S- "hot mess; she has anxiety, depression but I think she has a personality d/o", F-PTSD, MDD following MI (2/2 [facility] Nam)     Musculoskeletal:  Muscle strength/Tone: no dyskinesia/ no tremor  Gait/Station- non antalgic, no assistance needed    Blood pressure 133/89, pulse 96, height 5' 5" (1.651 m), weight 130 kg (286 lb 11.3 oz), last menstrual period 11/03/2019.    MSE: appears stated age, well groomed, appropriate dress, morbidly obese- but " "weight loss has been noticeable, engages well with examiner. Good e/c. Speech reg rate and vol, nonpressured. Mood is "pretty good." Affect congruent-tearful about some components of the custody situation, but themes are future oriented and positive. Sensorium fully intact. Oriented to date/day/location, current events. Narrative memory intact. Intellectual function is avg based on vocab and basic fund of knowledge. Thought is c/l/gd. No tangentiality or circumstantiality. No FOI/JULIA. Denies SI/HI. Denies A/VH. No evidence of delusions. Insight fair and Judgment intact and in keeping with insight    Suicide Risk Assessment:   Protective- age, gender, no prior attempts, no prior hospitalizations, no family h/o attempts, no ongoing substance abuse, no psychosis, , has children, denies SI/intent/plan, seeking treatment, access to treatment, future oriented, good primary support, no access to firearms    Risk- race, ongoing Axis I sxs    **Pt is at LOW imminent and long term risk of suicide given current risk factors.    Assessment:  37o WF w h/o MDD recurrent moderate and SAE. Referred for txmt by therapist Dr.W Roman. On eval the pt has a h/o MDD recurrent moderate which is currently in remission on prozac 40mg po qam and SAE which continues. Also a h/o PTSD sxs from childhood bullying which has set a pervasive pattern of self loathing and shame for this pt- now evidencing itself in poor self care- poor attention to overall wellness. BMI >45. No acute safety concerns. Due to cont'd wgt gain her PCP started vyvanse 30mg po qam. Pt has had a 6lb wgt loss and is tolerating well, although bp was elevated at that appt. She reported improved anxiety but mood worsening. Asked the pt to make some dietary changes - she then presented and we called  for collateral- pt reported concern for bipolar II d/o. I am uncertain of this- think sxs more c/w personality structure however she and  motivated for trial " of lamictal for txmt of bipolar II depression- pt identifying sxs c/w diag. Pt with limited effort at her own recovery- does not implement health lifestyle changes but is also no longer taking vyvanse. Then reported tolerating lamictal well-on 200mg- endorsed mood improvement and improved impulsivity. Have cont'd to encourage therapy. Pt with lots of room for beh changes. Last appt presented after 6mos w/o care- transitioned to a NP at [facility] and was diagnosed with ADHD and strattera was started, prozac discontinued. Pt reports good mood and anxiety stability BUT  has filed for divorce. Upsetting but pt handling quite well. Wants to come back to me mainly due to court and my credentials (as opposed to NP)- I have shared my concerns, but she also has psych testing through court and those results will likely be helpful regarding adhd? Certainly some sxs exist and have been consistent throughout txmt- will cont meds w/o change at this time as she is reporting stability DESPITE emotionally charged home environment. does cont therapy at [facility]. She clarified previous lamictal dose as 300mg (no ebm to support this dose for mood stability- encouraged to dec back to 200mg), alse reported inc anxiety in context of legal proceedings and divorce. Will consider addition of ssri mgmt which is treatment of choice for anxiety but also for personality traits contributing to emotional reactivity- will dec lamictal initially and then reassess- will also cont strattera at this time. Now in [place] therapy with [facility], [place] therapy at [facility], and had a psych eval in [place] at a court rec'd psychologist (end of July)- will cont med mgmt. Unfortunate motivator, but pt has really made some much needed behavioral interventions and now has 40lb wgt loss with improved sleep drive/sleep and better energy. Silver lining- she acknowledges these positives and I continue to reflect to the pt that in some regards this experience has forced her into  "some self reliance which has led to rushed maturation. She is thinking of future, financial needs and how to best lead life to cont parenting Susy well. Coping well- no clear need for ssri addition as previously considered. Today the home changes have occurred and led to emotionality. Alonzo has moved out and therefore split time with susy is leading to a great deal of grief- "i've seen her every day of her life until now." tearful- but less so today- we started lexapro for emotional reactivity and pt is tolerating it well and it seems to be helpful- will further titrate to 20mg po daily. 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 future oriented, themes are positive. RTC 2mos    Axis I: mood disorder nos (mdd in partial remission v bipolar II d/o- per pt/ sx report); SAE; h/o ADHD  Axis II: cluster b traits  Axis III: morbid obesity (BMI 47), insulin insensitivity, PCOS, AGUSTIN on cpap  Axis IV: chronic health and mental health concerns  Axis V: GAF 60    Plan:    1. Cont lamictal 200mg po daily  2. Inc Lexapro to 20mg po qam  3. Encouraged the pt to cont to engage in a low carb, high protein diet, cont exercise  4. RTC 2mo    -Spent 30min face to face with the pt; >50% time spent in counseling   -Supportive therapy and psychoeducation provided  -R/B/SE's of medications discussed with the pt who expresses understanding and chooses to take medications as prescribed.   -Pt instructed to call clinic, 911 or go to nearest emergency room if sxs worsen or pt is in   crisis. The pt expresses understanding.  "

## 2020-01-14 ENCOUNTER — OFFICE VISIT (OUTPATIENT)
Dept: PSYCHIATRY | Facility: CLINIC | Age: 40
End: 2020-01-14
Payer: COMMERCIAL

## 2020-01-14 VITALS
DIASTOLIC BLOOD PRESSURE: 86 MMHG | BODY MASS INDEX: 46.05 KG/M2 | SYSTOLIC BLOOD PRESSURE: 127 MMHG | HEART RATE: 94 BPM | HEIGHT: 65 IN | WEIGHT: 276.38 LBS

## 2020-01-14 DIAGNOSIS — F33.1 MDD (MAJOR DEPRESSIVE DISORDER), RECURRENT EPISODE, MODERATE: Primary | ICD-10-CM

## 2020-01-14 DIAGNOSIS — F41.8 DEPRESSION WITH ANXIETY: ICD-10-CM

## 2020-01-14 DIAGNOSIS — E66.01 MORBID OBESITY WITH BMI OF 45.0-49.9, ADULT: ICD-10-CM

## 2020-01-14 DIAGNOSIS — F31.81 BIPOLAR II DISORDER: ICD-10-CM

## 2020-01-14 DIAGNOSIS — G47.33 OSA (OBSTRUCTIVE SLEEP APNEA): ICD-10-CM

## 2020-01-14 DIAGNOSIS — F41.1 GAD (GENERALIZED ANXIETY DISORDER): ICD-10-CM

## 2020-01-14 DIAGNOSIS — E28.2 PCOS (POLYCYSTIC OVARIAN SYNDROME): ICD-10-CM

## 2020-01-14 PROCEDURE — 3074F PR MOST RECENT SYSTOLIC BLOOD PRESSURE < 130 MM HG: ICD-10-PCS | Mod: CPTII,S$GLB,, | Performed by: PSYCHIATRY & NEUROLOGY

## 2020-01-14 PROCEDURE — 3008F PR BODY MASS INDEX (BMI) DOCUMENTED: ICD-10-PCS | Mod: CPTII,S$GLB,, | Performed by: PSYCHIATRY & NEUROLOGY

## 2020-01-14 PROCEDURE — 3074F SYST BP LT 130 MM HG: CPT | Mod: CPTII,S$GLB,, | Performed by: PSYCHIATRY & NEUROLOGY

## 2020-01-14 PROCEDURE — 99999 PR PBB SHADOW E&M-EST. PATIENT-LVL II: CPT | Mod: PBBFAC,,, | Performed by: PSYCHIATRY & NEUROLOGY

## 2020-01-14 PROCEDURE — 3008F BODY MASS INDEX DOCD: CPT | Mod: CPTII,S$GLB,, | Performed by: PSYCHIATRY & NEUROLOGY

## 2020-01-14 PROCEDURE — 99214 PR OFFICE/OUTPT VISIT, EST, LEVL IV, 30-39 MIN: ICD-10-PCS | Mod: S$GLB,,, | Performed by: PSYCHIATRY & NEUROLOGY

## 2020-01-14 PROCEDURE — 3079F PR MOST RECENT DIASTOLIC BLOOD PRESSURE 80-89 MM HG: ICD-10-PCS | Mod: CPTII,S$GLB,, | Performed by: PSYCHIATRY & NEUROLOGY

## 2020-01-14 PROCEDURE — 99999 PR PBB SHADOW E&M-EST. PATIENT-LVL II: ICD-10-PCS | Mod: PBBFAC,,, | Performed by: PSYCHIATRY & NEUROLOGY

## 2020-01-14 PROCEDURE — 90833 PR PSYCHOTHERAPY W/PATIENT W/E&M, 30 MIN (ADD ON): ICD-10-PCS | Mod: S$GLB,,, | Performed by: PSYCHIATRY & NEUROLOGY

## 2020-01-14 PROCEDURE — 99214 OFFICE O/P EST MOD 30 MIN: CPT | Mod: S$GLB,,, | Performed by: PSYCHIATRY & NEUROLOGY

## 2020-01-14 PROCEDURE — 90833 PSYTX W PT W E/M 30 MIN: CPT | Mod: S$GLB,,, | Performed by: PSYCHIATRY & NEUROLOGY

## 2020-01-14 PROCEDURE — 3079F DIAST BP 80-89 MM HG: CPT | Mod: CPTII,S$GLB,, | Performed by: PSYCHIATRY & NEUROLOGY

## 2020-01-14 RX ORDER — LAMOTRIGINE 200 MG/1
200 TABLET ORAL DAILY
Qty: 90 TABLET | Refills: 0 | Status: SHIPPED | OUTPATIENT
Start: 2020-01-14 | End: 2020-03-24

## 2020-01-14 RX ORDER — ESCITALOPRAM OXALATE 20 MG/1
20 TABLET ORAL DAILY
Qty: 90 TABLET | Refills: 0 | Status: SHIPPED | OUTPATIENT
Start: 2020-01-14 | End: 2020-04-14 | Stop reason: SDUPTHER

## 2020-01-14 NOTE — PROGRESS NOTES
"ID: 34yo WF w h/o MDD recurrent moderate and SAE. Referred for txmt by therapist Dr.W Roman. Patient stable on prozac 40mg po daily. diag changed to bipolar II d/o based on sxs report from pt and - started lamictal titration. Pt then represented mid divorce proceedings. No longer supporting the bipolar ii diag and hedging that perhaps  was over reporting sxs in prep for divorce. Continues lamictal and has been markedly stable through major life change.    CC: anxiety    Interim Hx: presents on time. Chart reviewed.     Pt doing well. We inc'd lexapro at last appt and pt reports today that she "just feel(s) better. Nothing specific, but just in general."     Alonzo has asked for sole custody, less than 50% time with pt and [facility], and "is obviously taking me to court." Per pt you only file things of this nature in order to go to court. He expressed mult concerns all of which were surrounding pt's mental health, per pt report. Also requires a 2nd custody eval with the same previous PhD. Uncertain how or why this would change from previous eval but the pt wants me to be aware that likely I will be asked for records again.     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 Has to quit in a couple of weeks in order to start FT nursing school. 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 16mos accelerated and then there's a bridge program at [employer] to RN.     Pt has paperwork for me to complete for accommodations for extended time in testing. She does have h/o adhd and had testing accommodations in college. I am uncertain if this is required as I have never known the pt in a work or academic setting, but unfortunately accommodations have to be requested PRIOR TO semester beginning. Cannot be done mid semester, per paperwork. I will complete to best of my ability, but pt and I both agree I would like her to start school and make efforts at behavioral interventions prior to " "intervening with med mgmt of adhd sxs.     Again encourage her to lean towards adding in exercise- 20mins, 3 days/wk as additional anxiety/mood support.     Did go get evaluated for sleep apnea and now using cipap- just got the mask adjusted properly- will cont to assess sx improvement.     On Psychiatric ROS:     Endorses stable sleep- improved sleep hygiene, denies anhedonia- engaged with daughter and activities and school, denies feeling helpless/hopeless, denies dec energy, stable concentration, now stable appetite- trying to limit processed carbs- has some weight loss goals which are reasonable- continues losing weight, denies dec PMA    Denies thoughts of SI/intent/plan.      Endorses feeling LESS overwhelmed, +chronic ruminative thinking (not worse), +feeling tense/"on edge"- "the divorce stuff"    PSYCHOTHERAPY ADD-ON   30 (16-37*) minutes    Time: 30 minutes  Participants: Met with patient    Therapeutic Intervention Type: insight oriented psychotherapy, behavior modifying psychotherapy, supportive psychotherapy  Why chosen therapy is appropriate versus another modality: relevant to diagnosis, patient responds to this modality, evidence based practice    Target symptoms: health and wellness  Primary focus: diet, exercise  Psychotherapeutic techniques: support, education, validation, reframing, motivational interviewing    Outcome monitoring methods: self-report, observation, wgt monitoring    Patient's response to intervention:  The patient's response to intervention is accepting, motivated.    Progress toward goals:  The patient's progress toward goals is fair.    PPHx:   Denies h/o self injury   Denies inpt psych hospitalization  Denies h/o suicide attempt     Current Psych Meds: lamictal 200mg po qam, lexapro 10mg po qam  Past Psych Meds: zoloft 100mg (ineffective), effexor 75mg po qam (through PCP, can't recall effect), trazodone (nightmares), wellbutrin xl 150mg (h/a's and tmj), klonopin, ambien " "(ineffective), lexapro (yrs ago, can't recall),  strattera 60mg po daily    PMHx: PCOS, AGUSTIN/cipap (doesn't wear), chronic sinusitis/deviated septum, morbid obesity    SubstHx:   T- none  E- quit 11/2015 due to migraines; h/o binge drinking in college  D- denies   Caffeine- minimal- sometimes tea    FamPHx: M-anxiety, hoarding; Br- autism spectrum; S- "hot mess; she has anxiety, depression but I think she has a personality d/o", F-PTSD, MDD following MI (2/2 Antonio Nam)     Musculoskeletal:  Muscle strength/Tone: no dyskinesia/ no tremor  Gait/Station- non antalgic, no assistance needed    Blood pressure 127/86, pulse 94, height 5' 5" (1.651 m), weight 125.4 kg (276 lb 5.6 oz), last menstrual period 12/28/2019.    MSE: appears stated age, well groomed, appropriate dress, morbidly obese- but weight loss has been noticeable, engages well with examiner. Good e/c. Speech reg rate and vol, nonpressured. Mood is "it was fine. It's always hard to rehash the oren stuff." Affect congruent-tearful about some components of the custody situation, but themes are future oriented and positive. Sensorium fully intact. Oriented to date/day/location, current events. Narrative memory intact. Intellectual function is avg based on vocab and basic fund of knowledge. Thought is c/l/gd. No tangentiality or circumstantiality. No FOI/JULIA. Denies SI/HI. Denies A/VH. No evidence of delusions. Insight fair and Judgment intact and in keeping with insight    Suicide Risk Assessment:   Protective- age, gender, no prior attempts, no prior hospitalizations, no family h/o attempts, no ongoing substance abuse, no psychosis, , has children, denies SI/intent/plan, seeking treatment, access to treatment, future oriented, good primary support, no access to firearms    Risk- race, ongoing Axis I sxs    **Pt is at LOW imminent and long term risk of suicide given current risk factors.    Assessment:  37o WF w h/o MDD recurrent moderate and SAE. Referred " for txmt by therapist Dr.W oRman. On eval the pt has a h/o MDD recurrent moderate which is currently in remission on prozac 40mg po qam and SAE which continues. Also a h/o PTSD sxs from childhood bullying which has set a pervasive pattern of self loathing and shame for this pt- now evidencing itself in poor self care- poor attention to overall wellness. BMI >45. No acute safety concerns. Due to cont'd wgt gain her PCP started vyvanse 30mg po qam. Pt has had a 6lb wgt loss and is tolerating well, although bp was elevated at that appt. She reported improved anxiety but mood worsening. Asked the pt to make some dietary changes - she then presented and we called  for collateral- pt reported concern for bipolar II d/o. I am uncertain of this- think sxs more c/w personality structure however she and  motivated for trial of lamictal for txmt of bipolar II depression- pt identifying sxs c/w diag. Pt with limited effort at her own recovery- does not implement health lifestyle changes but is also no longer taking vyvanse. Then reported tolerating lamictal well-on 200mg- endorsed mood improvement and improved impulsivity. Have cont'd to encourage therapy. Pt with lots of room for beh changes. Last appt presented after 6mos w/o care- transitioned to a NP at [facility] and was diagnosed with ADHD and strattera was started, prozac discontinued. Pt reports good mood and anxiety stability BUT  has filed for divorce. Upsetting but pt handling quite well. Wants to come back to me mainly due to court and my credentials (as opposed to NP)- I have shared my concerns, but she also has psych testing through court and those results will likely be helpful regarding adhd? Certainly some sxs exist and have been consistent throughout txmt- will cont meds w/o change at this time as she is reporting stability DESPITE emotionally charged home environment. does cont therapy at [facility]. She clarified previous lamictal dose as  "300mg (no ebm to support this dose for mood stability- encouraged to dec back to 200mg), willian reported inc anxiety in context of legal proceedings and divorce. Will consider addition of ssri mgmt which is treatment of choice for anxiety but also for personality traits contributing to emotional reactivity- will dec lamictal initially and then reassess- will also cont strattera at this time. Now in ind therapy with [facility], marriage therapy at [facility], and had a psych eval in [place] at a court rec'd psychologist (end of July)- will cont med mgmt. Unfortunate motivator, but pt has really made some much needed behavioral interventions and now has 40lb wgt loss with improved sleep drive/sleep and better energy. Silver lining- she acknowledges these positives and I continue to reflect to the pt that in some regards this experience has forced her into some self reliance which has led to rushed maturation. She is thinking of future, financial needs and how to best lead life to cont parenting Susy well. Coping well- no clear need for ssri addition as previously considered. Today the home changes have occurred and led to emotionality. Alonzo has moved out and therefore split time with susy is leading to a great deal of grief- "i've seen her every day of her life until now." tearful- but less so today- we started lexapro for emotional reactivity and pt is tolerating it well and it seems to be helpful- will further titrate to 20mg po daily. 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 Today she presents and feels the lexapro inc has been helpful- has also enrolled in nursing school at [employer]- starts in 2wks. future oriented, themes are positive. RTC 3mos    Axis I: mood disorder nos (mdd in partial remission v bipolar II d/o- per pt/ sx report); SAE; h/o ADHD  Axis II: cluster b traits  Axis III: morbid obesity (BMI 47), insulin insensitivity, PCOS, AGUSTIN on cpap  Axis IV: chronic health and mental health " concerns  Axis V: GAF 60    Plan:    1. Cont lamictal 200mg po daily  2. Cont Lexapro 20mg po qam  3. Encouraged the pt to cont to engage in a low carb, high protein diet, cont exercise  4. RTC 3 mos    -Spent 30min face to face with the pt; >50% time spent in counseling   -Supportive therapy and psychoeducation provided  -R/B/SE's of medications discussed with the pt who expresses understanding and chooses to take medications as prescribed.   -Pt instructed to call clinic, 911 or go to nearest emergency room if sxs worsen or pt is in   crisis. The pt expresses understanding.

## 2020-01-21 ENCOUNTER — TELEPHONE (OUTPATIENT)
Dept: PSYCHIATRY | Facility: CLINIC | Age: 40
End: 2020-01-21

## 2020-01-21 NOTE — TELEPHONE ENCOUNTER
Patient called requesting a separate letter from Dr. Harrington stating patient need additional testing time and a quiet place during testing.    Patient stated this was discussed during last office visit.   Paula

## 2020-01-28 PROBLEM — D50.8 IRON DEFICIENCY ANEMIA SECONDARY TO INADEQUATE DIETARY IRON INTAKE: Status: ACTIVE | Noted: 2020-01-28

## 2020-02-17 ENCOUNTER — TELEPHONE (OUTPATIENT)
Dept: PSYCHIATRY | Facility: CLINIC | Age: 40
End: 2020-02-17

## 2020-02-17 NOTE — TELEPHONE ENCOUNTER
Patient would like some advice on how to move forward with a new therapist.   States current therapist with [facility] and [facility] will be relocating.   States, her new location is unavailable to all her current patients    States provider signed an agreement not to disclose where she is relocation too.   [phone]  Paula

## 2020-02-18 NOTE — TELEPHONE ENCOUNTER
Patient returned phone call. Advised patient on Dr. Arriaza recommendation.  Patient verbalized understanding.  No further questions or concerns.  Paula

## 2020-03-23 DIAGNOSIS — F31.81 BIPOLAR II DISORDER: ICD-10-CM

## 2020-03-24 RX ORDER — LAMOTRIGINE 200 MG/1
TABLET ORAL
Qty: 90 TABLET | Refills: 0 | Status: SHIPPED | OUTPATIENT
Start: 2020-03-24 | End: 2020-04-14 | Stop reason: SDUPTHER

## 2020-04-14 ENCOUNTER — OFFICE VISIT (OUTPATIENT)
Dept: PSYCHIATRY | Facility: CLINIC | Age: 40
End: 2020-04-14
Payer: MEDICAID

## 2020-04-14 DIAGNOSIS — F41.8 DEPRESSION WITH ANXIETY: ICD-10-CM

## 2020-04-14 DIAGNOSIS — I10 ESSENTIAL HYPERTENSION: ICD-10-CM

## 2020-04-14 DIAGNOSIS — F31.81 BIPOLAR II DISORDER: Primary | ICD-10-CM

## 2020-04-14 DIAGNOSIS — G47.33 OSA (OBSTRUCTIVE SLEEP APNEA): ICD-10-CM

## 2020-04-14 PROCEDURE — 90833 PR PSYCHOTHERAPY W/PATIENT W/E&M, 30 MIN (ADD ON): ICD-10-PCS | Mod: 95,AF,HB, | Performed by: PSYCHIATRY & NEUROLOGY

## 2020-04-14 PROCEDURE — 99214 PR OFFICE/OUTPT VISIT, EST, LEVL IV, 30-39 MIN: ICD-10-PCS | Mod: 95,AF,HB, | Performed by: PSYCHIATRY & NEUROLOGY

## 2020-04-14 PROCEDURE — 90833 PSYTX W PT W E/M 30 MIN: CPT | Mod: 95,AF,HB, | Performed by: PSYCHIATRY & NEUROLOGY

## 2020-04-14 PROCEDURE — 99214 OFFICE O/P EST MOD 30 MIN: CPT | Mod: 95,AF,HB, | Performed by: PSYCHIATRY & NEUROLOGY

## 2020-04-14 RX ORDER — LAMOTRIGINE 200 MG/1
200 TABLET ORAL DAILY
Qty: 90 TABLET | Refills: 0 | Status: SHIPPED | OUTPATIENT
Start: 2020-04-14 | End: 2020-07-14 | Stop reason: SDUPTHER

## 2020-04-14 RX ORDER — ESCITALOPRAM OXALATE 20 MG/1
20 TABLET ORAL DAILY
Qty: 90 TABLET | Refills: 0 | Status: SHIPPED | OUTPATIENT
Start: 2020-04-14 | End: 2020-07-14 | Stop reason: SDUPTHER

## 2020-04-14 NOTE — PROGRESS NOTES
"ID: 36yo WF w h/o MDD recurrent moderate and SAE. Referred for txmt by therapist Dr.W Roman. Patient stable on prozac 40mg po daily. diag changed to bipolar II d/o based on sxs report from pt and - started lamictal titration. Pt then represented mid divorce proceedings. No longer supporting the bipolar ii diag and hedging that perhaps  was over reporting sxs in prep for divorce. Continues lamictal and has been markedly stable through major life change.    CC: anxiety    Interim Hx: presents on time. Chart reviewed.     Pt has very little voice today. "well, it's so dumb, I haven't had a voice for like a month. It doesn't hurt it's just a constant post nasal drip and so the cough with that has just ruined my voice. 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 "so i'm super grateful for that and i've had that continued and on track for graduating so i'm just been really mini with that."     Feels grateful to be able to be home with Susy more with her out of school and is trying to keep her on task with her home schooling. Feels that co parenting with alonzo is going "realtively well. I don't poke the bear."     Has a new counselor after last one left [facility]- is seeing someone q2wks. [place]? The new therapist thought current circumstances allowed for q2wk schedule and the intensity of her divorce proceedings has come down so this seems reasonable to me, as well.     Has changed attys for the divorce/custody proceedings. Alonzo has asked for full custody and this led to the change in atty for the pt. Family has been supportive in many ways including financially as the case has of course been a financial struggle.     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     On Psychiatric ROS:     Endorses stable sleep- improved sleep hygiene, denies anhedonia- engaged with daughter and activities and school, denies feeling helpless/hopeless, denies dec energy, stable concentration, now stable appetite- trying to limit processed carbs- has some weight loss goals which are reasonable- continues losing weight, denies dec PMA    Denies thoughts of SI/intent/plan.      Endorses feeling LESS overwhelmed, +chronic ruminative thinking (not worse), +feeling tense/"on edge"- "the divorce stuff"    PSYCHOTHERAPY ADD-ON   30 (16-37*) minutes    Time: 37 minutes  Participants: Met with patient    Therapeutic Intervention Type: insight oriented psychotherapy, behavior modifying psychotherapy, supportive psychotherapy  Why chosen therapy is appropriate versus another modality: relevant to diagnosis, patient responds to this modality, evidence based practice    Target symptoms: health and wellness, development of self, growth out of painful circumstances  Primary focus: diet, exercise, mindfulness work/awareness  Psychotherapeutic techniques: support, education, validation, reframing, motivational interviewing    Outcome monitoring methods: self-report, observation, feedback from therapist    Patient's response to intervention:  The patient's response to intervention is accepting, motivated.    Progress toward goals:  The patient's progress toward goals is fair.    PPHx:   Denies h/o self injury   Denies inpt psych hospitalization  Denies h/o suicide attempt     Current Psych Meds: lamictal 200mg po qam, lexapro 20mg po qam  Past Psych Meds: zoloft 100mg (ineffective), effexor 75mg po qam (through PCP, " "can't recall effect), trazodone (nightmares), wellbutrin xl 150mg (h/a's and tmj), klonopin, ambien (ineffective), lexapro (yrs ago, can't recall),  strattera 60mg po daily    PMHx: PCOS, AGUSTIN/cipap (doesn't wear), chronic sinusitis/deviated septum, morbid obesity    SubstHx:   T- none  E- quit 11/2015 due to migraines; h/o binge drinking in college  D- denies   Caffeine- minimal- sometimes tea    FamPHx: M-anxiety, hoarding; Br- autism spectrum; S- "hot mess; she has anxiety, depression but I think she has a personality d/o", F-PTSD, MDD following MI (2/2 Antonio Nam)     Musculoskeletal:  Muscle strength/Tone: no dyskinesia/ no tremor  Gait/Station- non antalgic, no assistance needed    There were no vitals taken for this visit.    MSE: appears stated age, well groomed, appropriate dress, morbidly obese- but weight loss has been noticeable, engages well with examiner. Good e/c. Speech reg rate and vol, nonpressured. Mood is "fine. I really think the anxiety is better on what i'm taking now. The edge is just gone." Affect congruent- no tearfulness today, themes are future oriented and positive. Sensorium fully intact. Oriented to date/day/location, current events. Narrative memory intact. Intellectual function is avg based on vocab and basic fund of knowledge. Thought is c/l/gd. No tangentiality or circumstantiality. No FOI/JULIA. Denies SI/HI. Denies A/VH. No evidence of delusions. Insight fair and Judgment intact and in keeping with insight    Suicide Risk Assessment:   Protective- age, gender, no prior attempts, no prior hospitalizations, no family h/o attempts, no ongoing substance abuse, no psychosis, , has children, denies SI/intent/plan, seeking treatment, access to treatment, future oriented, good primary support, no access to firearms    Risk- race, ongoing Axis I sxs    **Pt is at LOW imminent and long term risk of suicide given current risk factors.    Assessment:  38yo WF w h/o MDD recurrent moderate " and SAE. Referred for txmt by therapist Dr.W Roman. On eval the pt has a h/o MDD recurrent moderate which is currently in remission on prozac 40mg po qam and SAE which continues. Also a h/o PTSD sxs from childhood bullying which has set a pervasive pattern of self loathing and shame for this pt- now evidencing itself in poor self care- poor attention to overall wellness. BMI >45. No acute safety concerns. Due to cont'd wgt gain her PCP started vyvanse 30mg po qam. Pt has had a 6lb wgt loss and is tolerating well, although bp was elevated at that appt. She reported improved anxiety but mood worsening. Asked the pt to make some dietary changes - she then presented and we called  for collateral- pt reported concern for bipolar II d/o. I am uncertain of this- think sxs more c/w personality structure however she and  motivated for trial of lamictal for txmt of bipolar II depression- pt identifying sxs c/w diag. Pt with limited effort at her own recovery- does not implement health lifestyle changes but is also no longer taking vyvanse. Then reported tolerating lamictal well-on 200mg- endorsed mood improvement and improved impulsivity. Have cont'd to encourage therapy. Pt with lots of room for beh changes. Last appt presented after 6mos w/o care- transitioned to a NP at [facility] and was diagnosed with ADHD and strattera was started, prozac discontinued. Pt reports good mood and anxiety stability BUT  has filed for divorce. Upsetting but pt handling quite well. Wants to come back to me mainly due to court and my credentials (as opposed to NP)- I have shared my concerns, but she also has psych testing through court and those results will likely be helpful regarding adhd? Certainly some sxs exist and have been consistent throughout txmt- will cont meds w/o change at this time as she is reporting stability DESPITE emotionally charged home environment. does cont therapy at [facility]. She clarified previous  "lamictal dose as 300mg (no ebm to support this dose for mood stability- encouraged to dec back to 200mg), willian reported inc anxiety in context of legal proceedings and divorce. Will consider addition of ssri mgmt which is treatment of choice for anxiety but also for personality traits contributing to emotional reactivity- will dec lamictal initially and then reassess- will also cont strattera at this time. Now in ind therapy with [facility] [place], marriage therapy at [facility] [place], and had a psych eval in [place] at a court rec'd psychologist (end of July)- will cont med mgmt. Unfortunate motivator, but pt has really made some much needed behavioral interventions and now has 40lb wgt loss with improved sleep drive/sleep and better energy. Silver lining- she acknowledges these positives and I continue to reflect to the pt that in some regards this experience has forced her into some self reliance which has led to rushed maturation. She is thinking of future, financial needs and how to best lead life to cont parenting Susy well. Coping well- no clear need for ssri addition as previously considered. Today the home changes have occurred and led to emotionality. Alonzo has moved out and therefore split time with susy is leading to a great deal of grief- "i've seen her every day of her life until now." tearful- but less so today- we started lexapro for emotional reactivity and pt is tolerating it well and it seems to be helpful- will further titrate to 20mg po daily. 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 She continues to feel the lexapro inc has been helpful- despite covid has been able to cont in nursing school at [employer]. future oriented, themes are positive. Has engaged with a new therapist after hers relocated and had a non-compete with inability to notify of new location. RTC 3mos    Axis I: mood disorder nos (mdd in partial remission v bipolar II d/o- per pt/ sx report); SAE; h/o ADHD  Axis II: cluster b " traits  Axis III: morbid obesity (BMI 47), insulin insensitivity, PCOS, AGUSTIN on cpap  Axis IV: chronic health and mental health concerns  Axis V: GAF 60    Plan:    1. Cont lamictal 200mg po daily  2. Cont Lexapro 20mg po qam  3. Encouraged the pt to cont to engage in a low carb, high protein diet, cont exercise  4. RTC 3 mos    -Spent 30min face to face with the pt; >50% time spent in counseling   -Supportive therapy and psychoeducation provided  -R/B/SE's of medications discussed with the pt who expresses understanding and chooses to take medications as prescribed.   -Pt instructed to call clinic, 911 or go to nearest emergency room if sxs worsen or pt is in   crisis. The pt expresses understanding.

## 2020-07-14 ENCOUNTER — OFFICE VISIT (OUTPATIENT)
Dept: PSYCHIATRY | Facility: CLINIC | Age: 40
End: 2020-07-14
Payer: MEDICAID

## 2020-07-14 DIAGNOSIS — F41.8 DEPRESSION WITH ANXIETY: ICD-10-CM

## 2020-07-14 DIAGNOSIS — F31.81 BIPOLAR II DISORDER: Primary | ICD-10-CM

## 2020-07-14 DIAGNOSIS — G47.33 OSA (OBSTRUCTIVE SLEEP APNEA): ICD-10-CM

## 2020-07-14 PROCEDURE — 99214 PR OFFICE/OUTPT VISIT, EST, LEVL IV, 30-39 MIN: ICD-10-PCS | Mod: 95,AF,HB, | Performed by: PSYCHIATRY & NEUROLOGY

## 2020-07-14 PROCEDURE — 99214 OFFICE O/P EST MOD 30 MIN: CPT | Mod: 95,AF,HB, | Performed by: PSYCHIATRY & NEUROLOGY

## 2020-07-14 RX ORDER — ESCITALOPRAM OXALATE 20 MG/1
20 TABLET ORAL DAILY
Qty: 90 TABLET | Refills: 0 | Status: SHIPPED | OUTPATIENT
Start: 2020-07-14 | End: 2020-10-04

## 2020-07-14 RX ORDER — LAMOTRIGINE 200 MG/1
200 TABLET ORAL DAILY
Qty: 90 TABLET | Refills: 0 | Status: SHIPPED | OUTPATIENT
Start: 2020-07-14 | End: 2020-10-04

## 2020-07-14 NOTE — PROGRESS NOTES
"The patient location is: home, [address] [place] [address]  The chief complaint leading to consultation is: anxiety    Visit type: audiovisual    Face to Face time with patient: 30 min  30 minutes of total time spent on the encounter, which includes face to face time and non-face to face time preparing to see the patient (eg, review of tests), Obtaining and/or reviewing separately obtained history, Documenting clinical information in the electronic or other health record, Independently interpreting results (not separately reported) and communicating results to the patient/family/caregiver, or Care coordination (not separately reported).     Each patient to whom he or she provides medical services by telemedicine is:  (1) informed of the relationship between the physician and patient and the respective role of any other health care provider with respect to management of the patient; and (2) notified that he or she may decline to receive medical services by telemedicine and may withdraw from such care at any time.    ID: 34yo WF w h/o MDD recurrent moderate and SAE. Referred for txmt by therapist Dr.W Roman. Patient stable on prozac 40mg po daily. diag changed to bipolar II d/o based on sxs report from pt and - started lamictal titration. Pt then represented mid divorce proceedings. No longer supporting the bipolar ii diag and hedging that perhaps  was over reporting sxs in prep for divorce. Continues lamictal and has been markedly stable through major life change.    CC: anxiety    Interim Hx: presents on time. Chart reviewed.     Pt has a cough and converted to a virtual visit. Pt has chronically had this cough so does not believe this is covid. 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  "it's intense be/c it's summer and kind of jammed in there" - going to a nursing home with a dementia unit next week- will be doing rapid covid testing the morning they begin. " "uncertain if she should go with this cough.     Is doing well in school- has As in her courses.     leonel is doing well. She has a gerbil now, "and it is so cute and leonel is so cute with her. We've never had a pet so i'm into it. We have a travel cage and she takes it back and forth." leonel named her "Island" - they want to get another one because they are social animals so she wants a friend. Pt enjoying this process of caring for pet with daughter.     They have a court date set in oct and this should be the "trial" - perhaps the final.     On Psychiatric ROS:     Endorses difficulty with sleep due to cough, denies anhedonia- engaged with daughter and activities and school, denies feeling helpless/hopeless, denies dec energy, stable concentration, now stable appetite- trying to limit processed carbs- has some weight loss goals which are reasonable- continues losing weight, denies dec PMA    Denies thoughts of SI/intent/plan.      Endorses feeling LESS overwhelmed, +chronic ruminative thinking (not worse), +feeling tense/"on edge"- "the divorce stuff"    PPHx:   Denies h/o self injury   Denies inpt psych hospitalization  Denies h/o suicide attempt     Current Psych Meds: lamictal 200mg po qam, lexapro 20mg po qam  Past Psych Meds: zoloft 100mg (ineffective), effexor 75mg po qam (through PCP, can't recall effect), trazodone (nightmares), wellbutrin xl 150mg (h/a's and tmj), klonopin, ambien (ineffective), lexapro (yrs ago, can't recall),  strattera 60mg po daily    PMHx: PCOS, AGUSTIN/cipap (doesn't wear), chronic sinusitis/deviated septum, morbid obesity    SubstHx:   T- none  E- quit 11/2015 due to migraines; h/o binge drinking in college  D- denies   Caffeine- minimal- sometimes tea    FamPHx: M-anxiety, hoarding; Br- autism spectrum; S- "hot mess; she has anxiety, depression but I think she has a personality d/o", F-PTSD, MDD following MI (2/2 [facility] Nam)     Musculoskeletal:  Muscle strength/Tone: no dyskinesia/ no " "tremor  Gait/Station- non antalgic, no assistance needed    There were no vitals taken for this visit.    MSE: appears stated age, well groomed, appropriate dress, morbidly obese- but weight loss has been noticeable, engages well with examiner. Good e/c. Speech reg rate and vol, nonpressured. Mood is "pretty good." Affect congruent- no tearfulness today, themes are future oriented and positive. Sensorium fully intact. Oriented to date/day/location, current events. Narrative memory intact. Intellectual function is avg based on vocab and basic fund of knowledge. Thought is c/l/gd. No tangentiality or circumstantiality. No FOI/JULIA. Denies SI/HI. Denies A/VH. No evidence of delusions. Insight fair and Judgment intact and in keeping with insight    Suicide Risk Assessment:   Protective- age, gender, no prior attempts, no prior hospitalizations, no family h/o attempts, no ongoing substance abuse, no psychosis, , has children, denies SI/intent/plan, seeking treatment, access to treatment, future oriented, good primary support, no access to firearms    Risk- race, ongoing Axis I sxs    **Pt is at LOW imminent and long term risk of suicide given current risk factors.    Assessment:  40yo WF w h/o MDD recurrent moderate and SAE. Referred for txmt by therapist Dr.W Roman. On eval the pt has a h/o MDD recurrent moderate which is currently in remission on prozac 40mg po qam and SAE which continues. Also a h/o PTSD sxs from childhood bullying which has set a pervasive pattern of self loathing and shame for this pt- now evidencing itself in poor self care- poor attention to overall wellness. BMI >45. No acute safety concerns. Due to cont'd wgt gain her PCP started vyvanse 30mg po qam. Pt has had a 6lb wgt loss and is tolerating well, although bp was elevated at that appt. She reported improved anxiety but mood worsening. Asked the pt to make some dietary changes - she then presented and we called  for collateral- " pt reported concern for bipolar II d/o. I am uncertain of this- think sxs more c/w personality structure however she and  motivated for trial of lamictal for txmt of bipolar II depression- pt identifying sxs c/w diag. Pt with limited effort at her own recovery- does not implement health lifestyle changes but is also no longer taking vyvanse. Then reported tolerating lamictal well-on 200mg- endorsed mood improvement and improved impulsivity. Have cont'd to encourage therapy. Pt with lots of room for beh changes. Last appt presented after 6mos w/o care- transitioned to a NP at [facility] and was diagnosed with ADHD and strattera was started, prozac discontinued. Pt reports good mood and anxiety stability BUT  has filed for divorce. Upsetting but pt handling quite well. Wants to come back to me mainly due to court and my credentials (as opposed to NP)- I have shared my concerns, but she also has psych testing through court and those results will likely be helpful regarding adhd? Certainly some sxs exist and have been consistent throughout txmt- will cont meds w/o change at this time as she is reporting stability DESPITE emotionally charged home environment. does cont [facility]. She clarified previous lamictal dose as 300mg (no ebm to support this dose for mood stability- encouraged to dec back to 200mg), alse reported inc anxiety in context of legal proceedings and divorce. Will consider addition of ssri mgmt which is treatment of choice for anxiety but also for personality traits contributing to emotional reactivity- will dec lamictal initially and then reassess- will also cont strattera at this time. Now in ind therapy with [facility], marriage therapy at [facility], and had a psych eval in [place] at a court rec'd psychologist (end of July)- will cont med mgmt. Unfortunate motivator, but pt has really made some much needed behavioral interventions and now has 40lb wgt loss with improved sleep drive/sleep and better  "energy. Silver lining- she acknowledges these positives and I continue to reflect to the pt that in some regards this experience has forced her into some self reliance which has led to rushed maturation. She is thinking of future, financial needs and how to best lead life to cont parenting Susy well. Coping well- no clear need for ssri addition as previously considered. Today the home changes have occurred and led to emotionality. Alonzo has moved out and therefore split time with susy is leading to a great deal of grief- "i've seen her every day of her life until now." tearful- but less so today- we started lexapro for emotional reactivity and pt is tolerating it well and it seems to be helpful- will further titrate to 20mg po daily. 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 She continues to feel the lexapro inc has been helpful- despite covid has been able to cont in nursing school at [employer]. future oriented, themes are positive. Has engaged with a new therapist after hers relocated and had a non-compete with inability to notify of new location. RTC 3mos    Axis I: mood disorder nos (mdd in partial remission v bipolar II d/o- per pt/ sx report); SAE; h/o ADHD  Axis II: cluster b traits  Axis III: morbid obesity (BMI 47), insulin insensitivity, PCOS, AGUSTIN on cpap  Axis IV: chronic health and mental health concerns  Axis V: GAF 60    Plan:    1. Cont lamictal 200mg po daily  2. Cont Lexapro 20mg po qam  3. Encouraged the pt to cont to engage in a low carb, high protein diet, cont exercise  4. RTC 3 mos    -Spent 30min face to face with the pt; >50% time spent in counseling   -Supportive therapy and psychoeducation provided  -R/B/SE's of medications discussed with the pt who expresses understanding and chooses to take medications as prescribed.   -Pt instructed to call clinic, 911 or go to nearest emergency room if sxs worsen or pt is in   crisis. The pt expresses understanding.    "

## 2020-10-13 ENCOUNTER — OFFICE VISIT (OUTPATIENT)
Dept: PSYCHIATRY | Facility: CLINIC | Age: 40
End: 2020-10-13
Payer: MEDICAID

## 2020-10-13 VITALS
SYSTOLIC BLOOD PRESSURE: 144 MMHG | RESPIRATION RATE: 18 BRPM | HEART RATE: 84 BPM | BODY MASS INDEX: 48.82 KG/M2 | HEIGHT: 65 IN | WEIGHT: 293 LBS | DIASTOLIC BLOOD PRESSURE: 98 MMHG

## 2020-10-13 DIAGNOSIS — F41.1 GAD (GENERALIZED ANXIETY DISORDER): Primary | ICD-10-CM

## 2020-10-13 DIAGNOSIS — E28.2 PCOS (POLYCYSTIC OVARIAN SYNDROME): ICD-10-CM

## 2020-10-13 DIAGNOSIS — E66.01 MORBID OBESITY: ICD-10-CM

## 2020-10-13 DIAGNOSIS — I10 ESSENTIAL HYPERTENSION: ICD-10-CM

## 2020-10-13 DIAGNOSIS — F39 MOOD DISORDER: ICD-10-CM

## 2020-10-13 DIAGNOSIS — G47.33 OSA (OBSTRUCTIVE SLEEP APNEA): ICD-10-CM

## 2020-10-13 PROCEDURE — 99214 OFFICE O/P EST MOD 30 MIN: CPT | Mod: AF,HB,S$PBB, | Performed by: PSYCHIATRY & NEUROLOGY

## 2020-10-13 PROCEDURE — 99999 PR PBB SHADOW E&M-EST. PATIENT-LVL III: ICD-10-PCS | Mod: PBBFAC,,, | Performed by: PSYCHIATRY & NEUROLOGY

## 2020-10-13 PROCEDURE — 99213 OFFICE O/P EST LOW 20 MIN: CPT | Mod: PBBFAC,PO | Performed by: PSYCHIATRY & NEUROLOGY

## 2020-10-13 PROCEDURE — 99999 PR PBB SHADOW E&M-EST. PATIENT-LVL III: CPT | Mod: PBBFAC,,, | Performed by: PSYCHIATRY & NEUROLOGY

## 2020-10-13 PROCEDURE — 99214 PR OFFICE/OUTPT VISIT, EST, LEVL IV, 30-39 MIN: ICD-10-PCS | Mod: AF,HB,S$PBB, | Performed by: PSYCHIATRY & NEUROLOGY

## 2020-10-13 NOTE — PROGRESS NOTES
ID: 34yo WF w h/o MDD recurrent moderate and SAE. Referred for txmt by therapist Dr.W Roman. Patient stable on prozac 40mg po daily. diag changed to bipolar II d/o based on sxs report from pt and - started lamictal titration. Pt then represented mid divorce proceedings. No longer supporting the bipolar ii diag and hedging that perhaps  was over reporting sxs in prep for divorce. Continues lamictal and has been markedly stable through major life change.    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 Chart reviewed.     Pt's ex had asked for an additional review of her custody arrangement and through that re eval the custody evaluator has really changed course since initial eval- in this process she has found that the pt should be domiciliary parent and also recs for cont'd 50/50. The re eval was on behalf of ex who was asking for sole custody? He was previously the domiciliary parent.     I spoke with the evaluator, , for the purpose of that re eval and only spoke to my experience of the pt over the last year. Shared that she has been stable, compliant, self sufficient and that she seems consistently engaged, involved and present in leonel's life and activities.     An additional concern has happened in the interim- leonel's child therapist has been arrested on 100s of counts of child pornography found on his computer. It has been public as he was employed at a well thought of mental health clinic in the area. Pt shares today that her ex - as standing dom parent- declined leonel being questioned about her experience with Dandre (therapist) for the investigation that's being done. Pt is concerned about that and has expressed it- leonel now scheduled with a therapist for a one time interview for this purpose on the [facility]. Pt going there from here.     Pt reports the need to  "re address adhd sxs and possible txmt. Does have a historical diagnosis although she did not benefit from strattera txmt in the past and today has elevated bp which would be a c/i to moving forward with stimulant. I'd like to reeval, but also we need more time than offered today due to pt's arrival time for appt- she expresses understanding of all of these parameters and we'll r/s for an appt soon for the purpose of adhd diag.     On Psychiatric ROS:     Endorses stable sleep, denies anhedonia- engaged with daughter and activities and school, denies feeling helpless/hopeless, denies dec energy, stable concentration, now stable appetite- trying to limit processed carbs- has some weight loss goals which are reasonable- continues losing weight, denies dec PMA    Denies thoughts of SI/intent/plan.      Endorses feeling LESS overwhelmed, +chronic ruminative thinking (not worse), +feeling tense/"on edge"- "the divorce stuff"    PPHx:   Denies h/o self injury   Denies inpt psych hospitalization  Denies h/o suicide attempt     Current Psych Meds: lamictal 200mg po qam, lexapro 20mg po qam  Past Psych Meds: zoloft 100mg (ineffective), effexor 75mg po qam (through PCP, can't recall effect), trazodone (nightmares), wellbutrin xl 150mg (h/a's and tmj), klonopin, ambien (ineffective), lexapro (yrs ago, can't recall),  strattera 60mg po daily    PMHx: PCOS, AGUSTIN/cipap (doesn't wear), chronic sinusitis/deviated septum, morbid obesity    SubstHx:   T- none  E- quit 11/2015 due to migraines; h/o binge drinking in college  D- denies   Caffeine- minimal- sometimes tea    FamPHx: M-anxiety, hoarding; Br- autism spectrum; S- "hot mess; she has anxiety, depression but I think she has a personality d/o", F-PTSD, MDD following MI (2/2 Antonio Nam)     Musculoskeletal:  Muscle strength/Tone: no dyskinesia/ no tremor  Gait/Station- non antalgic, no assistance needed    Blood pressure (!) 144/98, pulse 84, resp. rate 18, height 5' 5" (1.651 m), " "weight (!) 139 kg (306 lb 7 oz), last menstrual period 10/10/2020.    MSE: appears stated age, well groomed, appropriate dress, morbidly obese- but weight loss has been noticeable, engages well with examiner. Good e/c. Speech reg rate and vol, nonpressured. Mood is "pretty good." Affect congruent- no tearfulness today, themes are future oriented and positive. Sensorium fully intact. Oriented to date/day/location, current events. Narrative memory intact. Intellectual function is avg based on vocab and basic fund of knowledge. Thought is c/l/gd. No tangentiality or circumstantiality. No FOI/JULIA. Denies SI/HI. Denies A/VH. No evidence of delusions. Insight fair and Judgment intact and in keeping with insight    Suicide Risk Assessment:   Protective- age, gender, no prior attempts, no prior hospitalizations, no family h/o attempts, no ongoing substance abuse, no psychosis, , has children, denies SI/intent/plan, seeking treatment, access to treatment, future oriented, good primary support, no access to firearms    Risk- race, ongoing Axis I sxs    **Pt is at LOW imminent and long term risk of suicide given current risk factors.    Assessment:  40yo WF w h/o MDD recurrent moderate and SAE. Referred for txmt by therapist Dr.W Roman. On eval the pt has a h/o MDD recurrent moderate which is currently in remission on prozac 40mg po qam and SAE which continues. Also a h/o PTSD sxs from childhood bullying which has set a pervasive pattern of self loathing and shame for this pt- now evidencing itself in poor self care- poor attention to overall wellness. BMI >45. No acute safety concerns. Due to cont'd wgt gain her PCP started vyvanse 30mg po qam. Pt has had a 6lb wgt loss and is tolerating well, although bp was elevated at that appt. She reported improved anxiety but mood worsening. Asked the pt to make some dietary changes - she then presented and we called  for collateral- pt reported concern for bipolar II " d/o. I am uncertain of this- think sxs more c/w personality structure however she and  motivated for trial of lamictal for txmt of bipolar II depression- pt identifying sxs c/w diag. Pt with limited effort at her own recovery- does not implement health lifestyle changes but is also no longer taking vyvanse. Then reported tolerating lamictal well-on 200mg- endorsed mood improvement and improved impulsivity. Have cont'd to encourage therapy. Pt with lots of room for beh changes. Last appt presented after 6mos w/o care- transitioned to a NP at [facility] and was diagnosed with ADHD and strattera was started, prozac discontinued. Pt reports good mood and anxiety stability BUT  has filed for divorce. Upsetting but pt handling quite well. Wants to come back to me mainly due to court and my credentials (as opposed to NP)- I have shared my concerns, but she also has psych testing through court and those results will likely be helpful regarding adhd? Certainly some sxs exist and have been consistent throughout txmt- will cont meds w/o change at this time as she is reporting stability DESPITE emotionally charged home environment. does cont therapy at [facility]. She clarified previous lamictal dose as 300mg (no ebm to support this dose for mood stability- encouraged to dec back to 200mg), alse reported inc anxiety in context of legal proceedings and divorce. Will consider addition of ssri mgmt which is treatment of choice for anxiety but also for personality traits contributing to emotional reactivity- will dec lamictal initially and then reassess- will also cont strattera at this time. Now in ind therapy with [facility], marriage therapy at [facility], and had a psych eval in [place] at a court rec'd psychologist (end of July)- will cont med mgmt. Unfortunate motivator, but pt has really made some much needed behavioral interventions and now has 40lb wgt loss with improved sleep drive/sleep and better energy. Silver lining- she  "acknowledges these positives and I continue to reflect to the pt that in some regards this experience has forced her into some self reliance which has led to rushed maturation. She is thinking of future, financial needs and how to best lead life to cont parenting Susy well. Coping well- no clear need for ssri addition as previously considered. Today the home changes have occurred and led to emotionality. Alonzo has moved out and therefore split time with susy is leading to a great deal of grief- "i've seen her every day of her life until now." tearful- but less so today- we started lexapro for emotional reactivity and pt is tolerating it well and it seems to be helpful- will further titrate to 20mg po daily. 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 She continues to feel the lexapro inc has been helpful- despite covid has been able to cont in nursing school at [employer]. future oriented, themes are positive. Has engaged with a new therapist after hers relocated and had a non-compete with inability to notify of new location. In the interim I collaborated with her custody evaluator, , who has now rec'd pt be domiciliary parent with cont'd 50/50 custody- nice outcome for pt but likely to be contested by ex who was wanting sole custody.  being deposed over this new RE eval.     Pt late for appt today due to testing at school which ran long, apologetic but reports the need to re address adhd sxs and possible txmt. Does have a historical diagnosis although she did not benefit from strattera txmt in the past and today has elevated bp which would be a c/i to moving forward with stimulant. I'd like to reeval, but also we need more time than offered today due to pt's arrival time for appt- she expresses understanding of all of these parameters and we'll r/s for an appt soon for the purpose of adhd testing and discussion of meds. RTC 2-4wks.    Axis I: mood disorder nos (mdd in partial " remission v bipolar II d/o- per pt/ sx report); SAE; h/o ADHD  Axis II: cluster b traits  Axis III: morbid obesity (BMI 47), insulin insensitivity, PCOS, AGUSTIN on cpap  Axis IV: chronic health and mental health concerns  Axis V: GAF 60    Plan:    1. Cont lamictal 200mg po daily  2. Cont Lexapro 20mg po qam  3. Encouraged the pt to cont to engage in a low carb, high protein diet, cont exercise  4. RTC 2-4 wks.     -Spent 30min face to face with the pt; >50% time spent in counseling   -Supportive therapy and psychoeducation provided  -R/B/SE's of medications discussed with the pt who expresses understanding and chooses to take medications as prescribed.   -Pt instructed to call clinic, 911 or go to nearest emergency room if sxs worsen or pt is in   crisis. The pt expresses understanding.

## 2021-01-08 ENCOUNTER — OFFICE VISIT (OUTPATIENT)
Dept: PSYCHIATRY | Facility: CLINIC | Age: 41
End: 2021-01-08
Payer: MEDICAID

## 2021-01-08 DIAGNOSIS — G47.33 OSA (OBSTRUCTIVE SLEEP APNEA): ICD-10-CM

## 2021-01-08 DIAGNOSIS — F41.8 DEPRESSION WITH ANXIETY: ICD-10-CM

## 2021-01-08 DIAGNOSIS — E28.2 PCOS (POLYCYSTIC OVARIAN SYNDROME): ICD-10-CM

## 2021-01-08 DIAGNOSIS — F39 MOOD DISORDER: Primary | ICD-10-CM

## 2021-01-08 DIAGNOSIS — F31.81 BIPOLAR II DISORDER: ICD-10-CM

## 2021-01-08 PROCEDURE — 99214 OFFICE O/P EST MOD 30 MIN: CPT | Mod: 95,AF,HB, | Performed by: PSYCHIATRY & NEUROLOGY

## 2021-01-08 PROCEDURE — 90833 PR PSYCHOTHERAPY W/PATIENT W/E&M, 30 MIN (ADD ON): ICD-10-PCS | Mod: 95,AF,HB, | Performed by: PSYCHIATRY & NEUROLOGY

## 2021-01-08 PROCEDURE — 90833 PSYTX W PT W E/M 30 MIN: CPT | Mod: 95,AF,HB, | Performed by: PSYCHIATRY & NEUROLOGY

## 2021-01-08 PROCEDURE — 99214 PR OFFICE/OUTPT VISIT, EST, LEVL IV, 30-39 MIN: ICD-10-PCS | Mod: 95,AF,HB, | Performed by: PSYCHIATRY & NEUROLOGY

## 2021-01-08 RX ORDER — ESCITALOPRAM OXALATE 20 MG/1
20 TABLET ORAL DAILY
Qty: 90 TABLET | Refills: 0 | Status: SHIPPED | OUTPATIENT
Start: 2021-01-08 | End: 2021-04-06 | Stop reason: SDUPTHER

## 2021-01-08 RX ORDER — LAMOTRIGINE 200 MG/1
200 TABLET ORAL DAILY
Qty: 90 TABLET | Refills: 0 | Status: SHIPPED | OUTPATIENT
Start: 2021-01-08 | End: 2021-04-06 | Stop reason: SDUPTHER

## 2021-01-22 ENCOUNTER — PATIENT MESSAGE (OUTPATIENT)
Dept: ADMINISTRATIVE | Facility: OTHER | Age: 41
End: 2021-01-22

## 2021-01-23 ENCOUNTER — IMMUNIZATION (OUTPATIENT)
Dept: FAMILY MEDICINE | Facility: CLINIC | Age: 41
End: 2021-01-23
Payer: MEDICAID

## 2021-01-23 DIAGNOSIS — Z23 NEED FOR VACCINATION: Primary | ICD-10-CM

## 2021-01-23 PROCEDURE — 91300 COVID-19, MRNA, LNP-S, PF, 30 MCG/0.3 ML DOSE VACCINE: CPT | Mod: ,,, | Performed by: FAMILY MEDICINE

## 2021-01-23 PROCEDURE — 91300 COVID-19, MRNA, LNP-S, PF, 30 MCG/0.3 ML DOSE VACCINE: ICD-10-PCS | Mod: ,,, | Performed by: FAMILY MEDICINE

## 2021-01-23 PROCEDURE — 0001A COVID-19, MRNA, LNP-S, PF, 30 MCG/0.3 ML DOSE VACCINE: ICD-10-PCS | Mod: CV19,,, | Performed by: FAMILY MEDICINE

## 2021-01-23 PROCEDURE — 0001A COVID-19, MRNA, LNP-S, PF, 30 MCG/0.3 ML DOSE VACCINE: CPT | Mod: CV19,,, | Performed by: FAMILY MEDICINE

## 2021-02-13 ENCOUNTER — IMMUNIZATION (OUTPATIENT)
Dept: FAMILY MEDICINE | Facility: CLINIC | Age: 41
End: 2021-02-13
Payer: MEDICAID

## 2021-02-13 DIAGNOSIS — Z23 NEED FOR VACCINATION: Primary | ICD-10-CM

## 2021-02-13 PROCEDURE — 0002A COVID-19, MRNA, LNP-S, PF, 30 MCG/0.3 ML DOSE VACCINE: ICD-10-PCS | Mod: CV19,,, | Performed by: FAMILY MEDICINE

## 2021-02-13 PROCEDURE — 0002A COVID-19, MRNA, LNP-S, PF, 30 MCG/0.3 ML DOSE VACCINE: CPT | Mod: CV19,,, | Performed by: FAMILY MEDICINE

## 2021-02-13 PROCEDURE — 91300 COVID-19, MRNA, LNP-S, PF, 30 MCG/0.3 ML DOSE VACCINE: ICD-10-PCS | Mod: ,,, | Performed by: FAMILY MEDICINE

## 2021-02-13 PROCEDURE — 91300 COVID-19, MRNA, LNP-S, PF, 30 MCG/0.3 ML DOSE VACCINE: CPT | Mod: ,,, | Performed by: FAMILY MEDICINE

## 2021-04-06 ENCOUNTER — OFFICE VISIT (OUTPATIENT)
Dept: PSYCHIATRY | Facility: CLINIC | Age: 41
End: 2021-04-06
Payer: MEDICAID

## 2021-04-06 VITALS
WEIGHT: 293 LBS | SYSTOLIC BLOOD PRESSURE: 150 MMHG | BODY MASS INDEX: 48.82 KG/M2 | DIASTOLIC BLOOD PRESSURE: 98 MMHG | HEART RATE: 79 BPM | HEIGHT: 65 IN

## 2021-04-06 DIAGNOSIS — F39 MOOD DISORDER: Primary | ICD-10-CM

## 2021-04-06 DIAGNOSIS — E66.01 MORBID OBESITY: ICD-10-CM

## 2021-04-06 DIAGNOSIS — G47.33 OSA (OBSTRUCTIVE SLEEP APNEA): ICD-10-CM

## 2021-04-06 DIAGNOSIS — F41.8 DEPRESSION WITH ANXIETY: ICD-10-CM

## 2021-04-06 DIAGNOSIS — F31.81 BIPOLAR II DISORDER: ICD-10-CM

## 2021-04-06 PROCEDURE — 90833 PR PSYCHOTHERAPY W/PATIENT W/E&M, 30 MIN (ADD ON): ICD-10-PCS | Mod: AF,HB,S$PBB, | Performed by: PSYCHIATRY & NEUROLOGY

## 2021-04-06 PROCEDURE — 99999 PR PBB SHADOW E&M-EST. PATIENT-LVL III: ICD-10-PCS | Mod: PBBFAC,AF,HB, | Performed by: PSYCHIATRY & NEUROLOGY

## 2021-04-06 PROCEDURE — 99214 PR OFFICE/OUTPT VISIT, EST, LEVL IV, 30-39 MIN: ICD-10-PCS | Mod: AF,HB,S$PBB, | Performed by: PSYCHIATRY & NEUROLOGY

## 2021-04-06 PROCEDURE — 99214 OFFICE O/P EST MOD 30 MIN: CPT | Mod: AF,HB,S$PBB, | Performed by: PSYCHIATRY & NEUROLOGY

## 2021-04-06 PROCEDURE — 99213 OFFICE O/P EST LOW 20 MIN: CPT | Mod: PBBFAC,PO | Performed by: PSYCHIATRY & NEUROLOGY

## 2021-04-06 PROCEDURE — 90833 PSYTX W PT W E/M 30 MIN: CPT | Mod: AF,HB,S$PBB, | Performed by: PSYCHIATRY & NEUROLOGY

## 2021-04-06 PROCEDURE — 99999 PR PBB SHADOW E&M-EST. PATIENT-LVL III: CPT | Mod: PBBFAC,AF,HB, | Performed by: PSYCHIATRY & NEUROLOGY

## 2021-04-06 RX ORDER — LAMOTRIGINE 200 MG/1
200 TABLET ORAL DAILY
Qty: 90 TABLET | Refills: 0 | Status: SHIPPED | OUTPATIENT
Start: 2021-04-06 | End: 2021-06-30 | Stop reason: SDUPTHER

## 2021-04-06 RX ORDER — ESCITALOPRAM OXALATE 20 MG/1
20 TABLET ORAL DAILY
Qty: 90 TABLET | Refills: 0 | Status: SHIPPED | OUTPATIENT
Start: 2021-04-06 | End: 2021-06-30 | Stop reason: SDUPTHER

## 2021-05-28 ENCOUNTER — TELEPHONE (OUTPATIENT)
Dept: OBSTETRICS AND GYNECOLOGY | Facility: CLINIC | Age: 41
End: 2021-05-28

## 2021-06-16 ENCOUNTER — OFFICE VISIT (OUTPATIENT)
Dept: OBSTETRICS AND GYNECOLOGY | Facility: CLINIC | Age: 41
End: 2021-06-16
Payer: MEDICAID

## 2021-06-16 VITALS
HEIGHT: 65 IN | BODY MASS INDEX: 48.82 KG/M2 | DIASTOLIC BLOOD PRESSURE: 82 MMHG | WEIGHT: 293 LBS | SYSTOLIC BLOOD PRESSURE: 132 MMHG

## 2021-06-16 DIAGNOSIS — Z12.4 ENCOUNTER FOR PAP SMEAR OF CERVIX WITH HPV DNA COTESTING: Primary | ICD-10-CM

## 2021-06-16 DIAGNOSIS — Z87.42 HISTORY OF PCOS: ICD-10-CM

## 2021-06-16 DIAGNOSIS — Z01.419 ENCOUNTER FOR GYNECOLOGICAL EXAMINATION WITHOUT ABNORMAL FINDING: ICD-10-CM

## 2021-06-16 PROCEDURE — 99385 PREV VISIT NEW AGE 18-39: CPT | Mod: S$PBB,,, | Performed by: SPECIALIST

## 2021-06-16 PROCEDURE — 87624 HPV HI-RISK TYP POOLED RSLT: CPT | Performed by: SPECIALIST

## 2021-06-16 PROCEDURE — 99999 PR PBB SHADOW E&M-EST. PATIENT-LVL III: CPT | Mod: PBBFAC,,, | Performed by: SPECIALIST

## 2021-06-16 PROCEDURE — 99385 PR PREVENTIVE VISIT,NEW,18-39: ICD-10-PCS | Mod: S$PBB,,, | Performed by: SPECIALIST

## 2021-06-16 PROCEDURE — 99999 PR PBB SHADOW E&M-EST. PATIENT-LVL III: ICD-10-PCS | Mod: PBBFAC,,, | Performed by: SPECIALIST

## 2021-06-16 PROCEDURE — 99213 OFFICE O/P EST LOW 20 MIN: CPT | Mod: PBBFAC,PN | Performed by: SPECIALIST

## 2021-06-16 PROCEDURE — 88175 CYTOPATH C/V AUTO FLUID REDO: CPT | Performed by: SPECIALIST

## 2021-06-23 LAB
FINAL PATHOLOGIC DIAGNOSIS: NORMAL
Lab: NORMAL

## 2021-06-24 LAB
HPV HR 12 DNA SPEC QL NAA+PROBE: NEGATIVE
HPV16 AG SPEC QL: NEGATIVE
HPV18 DNA SPEC QL NAA+PROBE: NEGATIVE

## 2021-06-30 ENCOUNTER — PATIENT MESSAGE (OUTPATIENT)
Dept: PSYCHIATRY | Facility: CLINIC | Age: 41
End: 2021-06-30

## 2021-06-30 DIAGNOSIS — F41.8 DEPRESSION WITH ANXIETY: ICD-10-CM

## 2021-06-30 DIAGNOSIS — F31.81 BIPOLAR II DISORDER: ICD-10-CM

## 2021-06-30 RX ORDER — LAMOTRIGINE 200 MG/1
200 TABLET ORAL DAILY
Qty: 90 TABLET | Refills: 0 | Status: SHIPPED | OUTPATIENT
Start: 2021-06-30 | End: 2021-07-08 | Stop reason: SDUPTHER

## 2021-06-30 RX ORDER — ESCITALOPRAM OXALATE 20 MG/1
20 TABLET ORAL DAILY
Qty: 90 TABLET | Refills: 0 | Status: SHIPPED | OUTPATIENT
Start: 2021-06-30 | End: 2021-07-08 | Stop reason: SDUPTHER

## 2021-07-08 ENCOUNTER — OFFICE VISIT (OUTPATIENT)
Dept: PSYCHIATRY | Facility: CLINIC | Age: 41
End: 2021-07-08
Payer: MEDICAID

## 2021-07-08 VITALS
SYSTOLIC BLOOD PRESSURE: 137 MMHG | HEART RATE: 82 BPM | WEIGHT: 293 LBS | BODY MASS INDEX: 48.82 KG/M2 | DIASTOLIC BLOOD PRESSURE: 92 MMHG | HEIGHT: 65 IN

## 2021-07-08 DIAGNOSIS — E66.01 MORBID OBESITY: ICD-10-CM

## 2021-07-08 DIAGNOSIS — F31.81 BIPOLAR II DISORDER: Primary | ICD-10-CM

## 2021-07-08 DIAGNOSIS — E28.2 PCOS (POLYCYSTIC OVARIAN SYNDROME): ICD-10-CM

## 2021-07-08 DIAGNOSIS — F39 MOOD DISORDER: ICD-10-CM

## 2021-07-08 DIAGNOSIS — F41.8 DEPRESSION WITH ANXIETY: ICD-10-CM

## 2021-07-08 DIAGNOSIS — I10 ESSENTIAL HYPERTENSION: ICD-10-CM

## 2021-07-08 DIAGNOSIS — G47.33 OSA (OBSTRUCTIVE SLEEP APNEA): ICD-10-CM

## 2021-07-08 PROCEDURE — 99999 PR PBB SHADOW E&M-EST. PATIENT-LVL III: ICD-10-PCS | Mod: PBBFAC,AF,HB, | Performed by: PSYCHIATRY & NEUROLOGY

## 2021-07-08 PROCEDURE — 99214 PR OFFICE/OUTPT VISIT, EST, LEVL IV, 30-39 MIN: ICD-10-PCS | Mod: S$PBB,AF,HB, | Performed by: PSYCHIATRY & NEUROLOGY

## 2021-07-08 PROCEDURE — 99213 OFFICE O/P EST LOW 20 MIN: CPT | Mod: PBBFAC,PO | Performed by: PSYCHIATRY & NEUROLOGY

## 2021-07-08 PROCEDURE — 99999 PR PBB SHADOW E&M-EST. PATIENT-LVL III: CPT | Mod: PBBFAC,AF,HB, | Performed by: PSYCHIATRY & NEUROLOGY

## 2021-07-08 PROCEDURE — 99214 OFFICE O/P EST MOD 30 MIN: CPT | Mod: S$PBB,AF,HB, | Performed by: PSYCHIATRY & NEUROLOGY

## 2021-07-08 RX ORDER — LAMOTRIGINE 200 MG/1
200 TABLET ORAL DAILY
Qty: 90 TABLET | Refills: 0 | Status: SHIPPED | OUTPATIENT
Start: 2021-07-08 | End: 2021-10-08

## 2021-07-08 RX ORDER — ESCITALOPRAM OXALATE 20 MG/1
20 TABLET ORAL DAILY
Qty: 90 TABLET | Refills: 0 | Status: SHIPPED | OUTPATIENT
Start: 2021-07-08 | End: 2021-10-08 | Stop reason: SDUPTHER

## 2021-07-08 RX ORDER — MUPIROCIN 20 MG/G
OINTMENT TOPICAL 3 TIMES DAILY
COMMUNITY
Start: 2021-06-30 | End: 2021-08-23

## 2021-07-08 RX ORDER — DOXYCYCLINE 100 MG/1
100 CAPSULE ORAL DAILY
COMMUNITY
Start: 2021-06-30 | End: 2021-08-23

## 2021-07-08 RX ORDER — IBUPROFEN 800 MG/1
800 TABLET ORAL
COMMUNITY
Start: 2021-07-01 | End: 2021-07-23 | Stop reason: SDUPTHER

## 2021-10-08 ENCOUNTER — OFFICE VISIT (OUTPATIENT)
Dept: PSYCHIATRY | Facility: CLINIC | Age: 41
End: 2021-10-08
Payer: MEDICAID

## 2021-10-08 DIAGNOSIS — E28.2 PCOS (POLYCYSTIC OVARIAN SYNDROME): ICD-10-CM

## 2021-10-08 DIAGNOSIS — F31.81 BIPOLAR II DISORDER: Primary | ICD-10-CM

## 2021-10-08 DIAGNOSIS — G47.33 OSA (OBSTRUCTIVE SLEEP APNEA): ICD-10-CM

## 2021-10-08 DIAGNOSIS — F41.8 DEPRESSION WITH ANXIETY: ICD-10-CM

## 2021-10-08 PROCEDURE — 99214 PR OFFICE/OUTPT VISIT, EST, LEVL IV, 30-39 MIN: ICD-10-PCS | Mod: AF,HB,, | Performed by: PSYCHIATRY & NEUROLOGY

## 2021-10-08 PROCEDURE — 99214 OFFICE O/P EST MOD 30 MIN: CPT | Mod: AF,HB,, | Performed by: PSYCHIATRY & NEUROLOGY

## 2021-10-08 RX ORDER — ESCITALOPRAM OXALATE 20 MG/1
20 TABLET ORAL DAILY
Qty: 90 TABLET | Refills: 0 | Status: SHIPPED | OUTPATIENT
Start: 2021-10-08 | End: 2022-01-07

## 2021-10-08 RX ORDER — LAMOTRIGINE 150 MG/1
150 TABLET ORAL DAILY
Qty: 30 TABLET | Refills: 0 | Status: SHIPPED | OUTPATIENT
Start: 2021-10-08 | End: 2021-11-19

## 2021-11-19 ENCOUNTER — PATIENT MESSAGE (OUTPATIENT)
Dept: PSYCHIATRY | Facility: CLINIC | Age: 41
End: 2021-11-19

## 2021-11-19 DIAGNOSIS — F31.81 BIPOLAR II DISORDER: ICD-10-CM

## 2021-11-19 RX ORDER — LAMOTRIGINE 25 MG/1
TABLET ORAL
Qty: 30 TABLET | Refills: 0 | Status: SHIPPED | OUTPATIENT
Start: 2021-11-19 | End: 2021-12-07

## 2021-12-07 ENCOUNTER — OFFICE VISIT (OUTPATIENT)
Dept: PSYCHIATRY | Facility: CLINIC | Age: 41
End: 2021-12-07
Payer: COMMERCIAL

## 2021-12-07 VITALS
BODY MASS INDEX: 48.82 KG/M2 | HEART RATE: 68 BPM | DIASTOLIC BLOOD PRESSURE: 83 MMHG | HEIGHT: 65 IN | SYSTOLIC BLOOD PRESSURE: 128 MMHG | WEIGHT: 293 LBS

## 2021-12-07 DIAGNOSIS — F33.41 MDD (MAJOR DEPRESSIVE DISORDER), RECURRENT, IN PARTIAL REMISSION: Primary | ICD-10-CM

## 2021-12-07 DIAGNOSIS — F41.1 GAD (GENERALIZED ANXIETY DISORDER): ICD-10-CM

## 2021-12-07 DIAGNOSIS — G47.33 OSA (OBSTRUCTIVE SLEEP APNEA): ICD-10-CM

## 2021-12-07 DIAGNOSIS — E66.01 MORBID OBESITY: ICD-10-CM

## 2021-12-07 PROCEDURE — 99999 PR PBB SHADOW E&M-EST. PATIENT-LVL III: ICD-10-PCS | Mod: PBBFAC,,, | Performed by: PSYCHIATRY & NEUROLOGY

## 2021-12-07 PROCEDURE — 99214 OFFICE O/P EST MOD 30 MIN: CPT | Mod: S$GLB,,, | Performed by: PSYCHIATRY & NEUROLOGY

## 2021-12-07 PROCEDURE — 99999 PR PBB SHADOW E&M-EST. PATIENT-LVL III: CPT | Mod: PBBFAC,,, | Performed by: PSYCHIATRY & NEUROLOGY

## 2021-12-07 PROCEDURE — 99214 PR OFFICE/OUTPT VISIT, EST, LEVL IV, 30-39 MIN: ICD-10-PCS | Mod: S$GLB,,, | Performed by: PSYCHIATRY & NEUROLOGY

## 2022-01-07 ENCOUNTER — PATIENT MESSAGE (OUTPATIENT)
Dept: PSYCHIATRY | Facility: CLINIC | Age: 42
End: 2022-01-07

## 2022-01-08 ENCOUNTER — IMMUNIZATION (OUTPATIENT)
Dept: FAMILY MEDICINE | Facility: CLINIC | Age: 42
End: 2022-01-08
Payer: COMMERCIAL

## 2022-01-08 DIAGNOSIS — Z23 NEED FOR VACCINATION: Primary | ICD-10-CM

## 2022-01-08 PROCEDURE — 0004A COVID-19, MRNA, LNP-S, PF, 30 MCG/0.3 ML DOSE VACCINE: CPT | Mod: PBBFAC | Performed by: FAMILY MEDICINE

## 2022-03-15 ENCOUNTER — OFFICE VISIT (OUTPATIENT)
Dept: PSYCHIATRY | Facility: CLINIC | Age: 42
End: 2022-03-15
Payer: COMMERCIAL

## 2022-03-15 VITALS
WEIGHT: 293 LBS | HEART RATE: 80 BPM | BODY MASS INDEX: 48.82 KG/M2 | DIASTOLIC BLOOD PRESSURE: 87 MMHG | SYSTOLIC BLOOD PRESSURE: 137 MMHG | HEIGHT: 65 IN

## 2022-03-15 DIAGNOSIS — F41.1 GAD (GENERALIZED ANXIETY DISORDER): Primary | ICD-10-CM

## 2022-03-15 DIAGNOSIS — E28.2 PCOS (POLYCYSTIC OVARIAN SYNDROME): ICD-10-CM

## 2022-03-15 DIAGNOSIS — E66.01 MORBID OBESITY WITH BMI OF 50.0-59.9, ADULT: ICD-10-CM

## 2022-03-15 DIAGNOSIS — F41.8 DEPRESSION WITH ANXIETY: ICD-10-CM

## 2022-03-15 DIAGNOSIS — F39 MOOD DISORDER: ICD-10-CM

## 2022-03-15 PROCEDURE — 3008F BODY MASS INDEX DOCD: CPT | Mod: CPTII,S$GLB,, | Performed by: PSYCHIATRY & NEUROLOGY

## 2022-03-15 PROCEDURE — 99999 PR PBB SHADOW E&M-EST. PATIENT-LVL III: CPT | Mod: PBBFAC,,, | Performed by: PSYCHIATRY & NEUROLOGY

## 2022-03-15 PROCEDURE — 90833 PSYTX W PT W E/M 30 MIN: CPT | Mod: S$GLB,,, | Performed by: PSYCHIATRY & NEUROLOGY

## 2022-03-15 PROCEDURE — 3075F PR MOST RECENT SYSTOLIC BLOOD PRESS GE 130-139MM HG: ICD-10-PCS | Mod: CPTII,S$GLB,, | Performed by: PSYCHIATRY & NEUROLOGY

## 2022-03-15 PROCEDURE — 3079F PR MOST RECENT DIASTOLIC BLOOD PRESSURE 80-89 MM HG: ICD-10-PCS | Mod: CPTII,S$GLB,, | Performed by: PSYCHIATRY & NEUROLOGY

## 2022-03-15 PROCEDURE — 3075F SYST BP GE 130 - 139MM HG: CPT | Mod: CPTII,S$GLB,, | Performed by: PSYCHIATRY & NEUROLOGY

## 2022-03-15 PROCEDURE — 1160F RVW MEDS BY RX/DR IN RCRD: CPT | Mod: CPTII,S$GLB,, | Performed by: PSYCHIATRY & NEUROLOGY

## 2022-03-15 PROCEDURE — 99214 PR OFFICE/OUTPT VISIT, EST, LEVL IV, 30-39 MIN: ICD-10-PCS | Mod: S$GLB,,, | Performed by: PSYCHIATRY & NEUROLOGY

## 2022-03-15 PROCEDURE — 90833 PR PSYCHOTHERAPY W/PATIENT W/E&M, 30 MIN (ADD ON): ICD-10-PCS | Mod: S$GLB,,, | Performed by: PSYCHIATRY & NEUROLOGY

## 2022-03-15 PROCEDURE — 1159F MED LIST DOCD IN RCRD: CPT | Mod: CPTII,S$GLB,, | Performed by: PSYCHIATRY & NEUROLOGY

## 2022-03-15 PROCEDURE — 3079F DIAST BP 80-89 MM HG: CPT | Mod: CPTII,S$GLB,, | Performed by: PSYCHIATRY & NEUROLOGY

## 2022-03-15 PROCEDURE — 99214 OFFICE O/P EST MOD 30 MIN: CPT | Mod: S$GLB,,, | Performed by: PSYCHIATRY & NEUROLOGY

## 2022-03-15 PROCEDURE — 99999 PR PBB SHADOW E&M-EST. PATIENT-LVL III: ICD-10-PCS | Mod: PBBFAC,,, | Performed by: PSYCHIATRY & NEUROLOGY

## 2022-03-15 PROCEDURE — 1159F PR MEDICATION LIST DOCUMENTED IN MEDICAL RECORD: ICD-10-PCS | Mod: CPTII,S$GLB,, | Performed by: PSYCHIATRY & NEUROLOGY

## 2022-03-15 PROCEDURE — 3008F PR BODY MASS INDEX (BMI) DOCUMENTED: ICD-10-PCS | Mod: CPTII,S$GLB,, | Performed by: PSYCHIATRY & NEUROLOGY

## 2022-03-15 PROCEDURE — 1160F PR REVIEW ALL MEDS BY PRESCRIBER/CLIN PHARMACIST DOCUMENTED: ICD-10-PCS | Mod: CPTII,S$GLB,, | Performed by: PSYCHIATRY & NEUROLOGY

## 2022-03-15 RX ORDER — ESCITALOPRAM OXALATE 20 MG/1
20 TABLET ORAL DAILY
Qty: 90 TABLET | Refills: 0 | Status: SHIPPED | OUTPATIENT
Start: 2022-03-15 | End: 2022-06-23 | Stop reason: SDUPTHER

## 2022-03-15 RX ORDER — BUSPIRONE HYDROCHLORIDE 5 MG/1
5 TABLET ORAL 2 TIMES DAILY PRN
Qty: 60 TABLET | Refills: 0 | Status: SHIPPED | OUTPATIENT
Start: 2022-03-15 | End: 2022-06-23 | Stop reason: SDUPTHER

## 2022-03-15 NOTE — PROGRESS NOTES
"ID: 36yo WF w h/o MDD recurrent moderate and SAE. Referred for txmt by therapist Dr.W Roman. Patient stable on prozac 40mg po daily. diag changed to bipolar II d/o based on sxs report from pt and - started lamictal titration. Pt then represented mid divorce proceedings. No longer supporting the bipolar ii diag and hedging that perhaps  was over reporting sxs in prep for divorce. Continues lamictal and has been markedly stable through major life change.    CC: anxiety    Interim Hx: presents on time for appt. Chart reviewed.     Pt reports her anxiety has been high. Is not doing anything from a nonmedicinal standpoint to contribute to wellness. Is able to admit this. Laughs. 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 Was then given feedback last week by supervisor that she's "inconsistent" as a worker. Pt tearful. States, "how could I not be. I don't know the people or what they do or don't want from me any given shift. Like literally i'll go to work tonight and I have no idea where i'll be working. 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 Sometimes is in peds er "which makes me so anxious. So like today all day when i'm supposed to be sleeping i'm actually already anxious that tonight I may be in the peds er which I just hate." clearly, considering another job. Considering a psych unit as pay may be better given she has a masters degree in something psych related?     Enjoying stable paycheck- so this part is hard to consider losing-  Just completed a 12 hr shift- going home to sleep.     Susy doing well. Turned 8yo in Sept 2021.     Discussion AGAIN of adding in exercise now that she has a more structured schedule. Only working 36 hours a week in blocks of 12hrs. Has plenty of " "time.     On Psychiatric ROS:     Endorses stable sleep, denies anhedonia- engaged with daughter and activities and school, denies feeling helpless/hopeless, denies dec energy, stable concentration, now stable appetite- trying to limit processed carbs- has some weight loss goals which are reasonable- continues losing weight, denies dec PMA    Denies thoughts of SI/intent/plan.      Endorses feeling LESS overwhelmed, +chronic ruminative thinking (not worse), +feeling tense/"on edge"- "the divorce stuff"    PSYCHOTHERAPY ADD-ON   30 (16-37*) minutes    Time: 20 minutes  Participants: Met with patient    Therapeutic Intervention Type: insight oriented psychotherapy, behavior modifying psychotherapy, supportive psychotherapy  Why chosen therapy is appropriate versus another modality: relevant to diagnosis, patient responds to this modality, evidence based practice    Target symptoms: health and wellness  Primary focus: diet, exercise  Psychotherapeutic techniques: support, education, validation, reframing, motivational interviewing    Outcome monitoring methods: self-report, observation, wgt monitoring    Patient's response to intervention:  The patient's response to intervention is accepting, motivated.    Progress toward goals:  The patient's progress toward goals is not progressing.    PPHx:   Denies h/o self injury   Denies inpt psych hospitalization  Denies h/o suicide attempt     Current Psych Meds: lamictal 200mg po qam, lexapro 20mg po qam  Past Psych Meds: zoloft 100mg (ineffective), effexor 75mg po qam (through PCP, can't recall effect), trazodone (nightmares), wellbutrin xl 150mg (h/a's and tmj), klonopin, ambien (ineffective), lexapro (yrs ago, can't recall),  strattera 60mg po daily    PMHx: PCOS, AGUSTIN/cipap (doesn't wear), chronic sinusitis/deviated septum, morbid obesity    SubstHx:   T- none  E- quit 11/2015 due to migraines; h/o binge drinking in college  D- denies   Caffeine- minimal- sometimes " "tea    FamPHx: M-anxiety, hoarding; Br- autism spectrum; S- "hot mess; she has anxiety, depression but I think she has a personality d/o", F-PTSD, MDD following MI (2/2 [facility] Nam)     Musculoskeletal:  Muscle strength/Tone: no dyskinesia/ no tremor  Gait/Station- non antalgic, no assistance needed    Blood pressure 137/87, pulse 80, height 5' 5" (1.651 m), weight (!) 144.6 kg (318 lb 12.6 oz), last menstrual period 03/09/2022.    MSE: appears stated age, well groomed, appropriate dress, morbidly obese- but weight loss has been noticeable, engages well with examiner. Good e/c. Speech reg rate and vol, nonpressured. Mood is "anxious. tired" Affect congruent- no tearfulness today, themes are future oriented and positive. Sensorium fully intact. Oriented to date/day/location, current events. Narrative memory intact. Intellectual function is avg based on vocab and basic fund of knowledge. Thought is c/l/gd. No tangentiality or circumstantiality. No FOI/JULIA. Denies SI/HI. Denies A/VH. No evidence of delusions. Insight fair and Judgment intact and in keeping with insight    Suicide Risk Assessment:   Protective- age, gender, no prior attempts, no prior hospitalizations, no family h/o attempts, no ongoing substance abuse, no psychosis, , has children, denies SI/intent/plan, seeking treatment, access to treatment, future oriented, good primary support, no access to firearms    Risk- race, ongoing Axis I sxs    **Pt is at LOW imminent and long term risk of suicide given current risk factors.    Assessment:  40yo WF w h/o MDD recurrent moderate and SAE. Referred for txmt by therapist Dr.W Roman. On eval the pt has a h/o MDD recurrent moderate which is currently in remission on prozac 40mg po qam and SAE which continues. Also a h/o PTSD sxs from childhood bullying which has set a pervasive pattern of self loathing and shame for this pt- now evidencing itself in poor self care- poor attention to overall wellness. BMI " >45. No acute safety concerns. Due to cont'd wgt gain her PCP started vyvanse 30mg po qam. Pt has had a 6lb wgt loss and is tolerating well, although bp was elevated at that appt. She reported improved anxiety but mood worsening. Asked the pt to make some dietary changes - she then presented and we called  for collateral- pt reported concern for bipolar II d/o. I am uncertain of this- think sxs more c/w personality structure however she and  motivated for trial of lamictal for txmt of bipolar II depression- pt identifying sxs c/w diag. Pt with limited effort at her own recovery- does not implement health lifestyle changes but is also no longer taking vyvanse. Then reported tolerating lamictal well-on 200mg- endorsed mood improvement and improved impulsivity. Have cont'd to encourage therapy. Pt with lots of room for beh changes. Last appt presented after 6mos w/o care- transitioned to a NP at [facility] and was diagnosed with ADHD and strattera was started, prozac discontinued. Pt reports good mood and anxiety stability BUT  has filed for divorce. Upsetting but pt handling quite well. Wants to come back to me mainly due to court and my credentials (as opposed to NP)- I have shared my concerns, but she also has psych testing through court and those results will likely be helpful regarding adhd? Certainly some sxs exist and have been consistent throughout txmt- will cont meds w/o change at this time as she is reporting stability DESPITE emotionally charged home environment. does cont therapy at [facility]. She clarified previous lamictal dose as 300mg (no ebm to support this dose for mood stability- encouraged to dec back to 200mg), alse reported inc anxiety in context of legal proceedings and divorce. Will consider addition of ssri mgmt which is treatment of choice for anxiety but also for personality traits contributing to emotional reactivity- will dec lamictal initially and then reassess- will also  "cont strattera at this time. Now in ind therapy with [facility], [place] [facility] and had a psych eval in [place] at a court rec'd psychologist (end of July)- will cont med mgmt. Unfortunate motivator, but pt has really made some much needed behavioral interventions and now has 40lb wgt loss with improved sleep drive/sleep and better energy. Silver lining- she acknowledges these positives and I continue to reflect to the pt that in some regards this experience has forced her into some self reliance which has led to rushed maturation. She is thinking of future, financial needs and how to best lead life to cont parenting Susy well. Coping well- no clear need for ssri addition as previously considered. Today the home changes have occurred and led to emotionality. Alonzo has moved out and therefore split time with susy is leading to a great deal of grief- "i've seen her every day of her life until now." tearful- but less so today- we started lexapro for emotional reactivity and pt is tolerating it well and it seems to be helpful- will further titrate to 20mg po daily. 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 future oriented, themes are positive. Has engaged with a new therapist after hers relocated and had a non-compete with inability to notify of new location. In the interim I collaborated with her custody evaluator, , who has now rec'd pt be domiciliary parent with cont'd 50/50 custody- nice outcome for pt but likely to be contested by ex who was wanting sole custody.  being deposed over this new RE eval.     Pt late for appt today due to testing at school which ran long, apologetic but reports the need to re address adhd sxs and possible txmt. Does have a historical diagnosis although she did not benefit from strattera txmt in the past and today has elevated " "bp which would be a c/i to moving forward with stimulant. I'd like to reeval, but also we need more time than offered today due to pt's arrival time for appt- she expresses understanding of all of these parameters and we'll r/s for an appt soon for the purpose of adhd testing and discussion of meds. 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 We process a recent depostion of me/my records for which patient was in the room and heard my clinical thoughts of her. Pt stable and processing appropriately. Continues to be stable as she processes the necessary losses assoc with divorce. Divorce has been finalized! Pt now asking to make some med changes which had been put on hold due to this process. Will taper lamictal to 150mg po qam. No longer on lamictal- stable without. RTC 3mos in clinic.    Axis I: mood disorder nos (mdd in partial remission v bipolar II d/o- per pt/ sx report); SAE; h/o ADHD  Axis II: cluster b traits  Axis III: morbid obesity (BMI 47), insulin insensitivity, PCOS, AGUSTIN on cpap  Axis IV: chronic health and mental health concerns  Axis V: GAF 60    Plan:    1. Start trial of buspar 5mg po bid prn anxiety  -pt will notify me via portal   2. Cont Lexapro 20mg po qam  3. Encouraged the pt to cont to engage in a low carb, high protein diet, cont exercise  4. Pt continues regular therapy outside clinic  5. RTC 3mos in clinic    -Supportive therapy and psychoeducation provided  -R/B/SE's of medications discussed with the pt who expresses understanding and chooses to take medications as prescribed.   -Pt instructed to call clinic, 911 or go to nearest emergency room if sxs worsen or pt is in   crisis. The pt expresses understanding.            "

## 2022-04-01 PROBLEM — K42.0 INCARCERATED UMBILICAL HERNIA: Status: ACTIVE | Noted: 2022-04-01

## 2022-06-23 ENCOUNTER — OFFICE VISIT (OUTPATIENT)
Dept: PSYCHIATRY | Facility: CLINIC | Age: 42
End: 2022-06-23
Payer: COMMERCIAL

## 2022-06-23 DIAGNOSIS — F41.8 DEPRESSION WITH ANXIETY: ICD-10-CM

## 2022-06-23 DIAGNOSIS — F41.1 GAD (GENERALIZED ANXIETY DISORDER): Primary | ICD-10-CM

## 2022-06-23 PROCEDURE — 1159F PR MEDICATION LIST DOCUMENTED IN MEDICAL RECORD: ICD-10-PCS | Mod: CPTII,S$GLB,, | Performed by: PSYCHIATRY & NEUROLOGY

## 2022-06-23 PROCEDURE — 99999 PR PBB SHADOW E&M-EST. PATIENT-LVL I: ICD-10-PCS | Mod: PBBFAC,,, | Performed by: PSYCHIATRY & NEUROLOGY

## 2022-06-23 PROCEDURE — 1160F RVW MEDS BY RX/DR IN RCRD: CPT | Mod: CPTII,S$GLB,, | Performed by: PSYCHIATRY & NEUROLOGY

## 2022-06-23 PROCEDURE — 99214 PR OFFICE/OUTPT VISIT, EST, LEVL IV, 30-39 MIN: ICD-10-PCS | Mod: S$GLB,,, | Performed by: PSYCHIATRY & NEUROLOGY

## 2022-06-23 PROCEDURE — 99214 OFFICE O/P EST MOD 30 MIN: CPT | Mod: S$GLB,,, | Performed by: PSYCHIATRY & NEUROLOGY

## 2022-06-23 PROCEDURE — 99999 PR PBB SHADOW E&M-EST. PATIENT-LVL I: CPT | Mod: PBBFAC,,, | Performed by: PSYCHIATRY & NEUROLOGY

## 2022-06-23 PROCEDURE — 3044F HG A1C LEVEL LT 7.0%: CPT | Mod: CPTII,S$GLB,, | Performed by: PSYCHIATRY & NEUROLOGY

## 2022-06-23 PROCEDURE — 1159F MED LIST DOCD IN RCRD: CPT | Mod: CPTII,S$GLB,, | Performed by: PSYCHIATRY & NEUROLOGY

## 2022-06-23 PROCEDURE — 3044F PR MOST RECENT HEMOGLOBIN A1C LEVEL <7.0%: ICD-10-PCS | Mod: CPTII,S$GLB,, | Performed by: PSYCHIATRY & NEUROLOGY

## 2022-06-23 PROCEDURE — 1160F PR REVIEW ALL MEDS BY PRESCRIBER/CLIN PHARMACIST DOCUMENTED: ICD-10-PCS | Mod: CPTII,S$GLB,, | Performed by: PSYCHIATRY & NEUROLOGY

## 2022-06-23 RX ORDER — ESCITALOPRAM OXALATE 20 MG/1
20 TABLET ORAL NIGHTLY
Qty: 90 TABLET | Refills: 1 | Status: SHIPPED | OUTPATIENT
Start: 2022-06-23 | End: 2022-09-29 | Stop reason: ALTCHOICE

## 2022-06-23 RX ORDER — BUSPIRONE HYDROCHLORIDE 5 MG/1
5 TABLET ORAL DAILY PRN
Qty: 90 TABLET | Refills: 0 | Status: SHIPPED | OUTPATIENT
Start: 2022-06-23 | End: 2022-09-29

## 2022-06-23 NOTE — PROGRESS NOTES
"ID: 36yo WF w h/o MDD recurrent moderate and SAE. Referred for txmt by therapist Dr.W Roman. Patient stable on prozac 40mg po daily. diag changed to bipolar II d/o based on sxs report from pt and - started lamictal titration. Pt then represented mid divorce proceedings. No longer supporting the bipolar ii diag and hedging that perhaps  was over reporting sxs in prep for divorce. Continues lamictal and has been markedly stable through major life change.    CC: anxiety    Interim Hx: presents on time for appt. Chart reviewed.     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 And her area hired several traveling nurses which has helped the staffing concerns.     Leonel doing well. Turned 10yo in Sept 2021.  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 Is in a better frame of mind around the arrangements- accepting reality better.     i've continued (over years) to encourage some exercise and thoughtfulness around wellness, health - now with a more structured schedule and no child during weeks of work this is ultimately about the pt choosing to make this a priority- has not, but reports "feeling good" so i'll meet her where she is.     Transitioning care to  moving forward.     On Psychiatric ROS:     Endorses stable sleep, denies anhedonia- engaged with daughter and activities and school, denies feeling helpless/hopeless, denies dec energy, stable concentration, now stable appetite- trying to limit processed carbs- has some " "weight loss goals which are reasonable- continues losing weight, denies dec PMA    Denies thoughts of SI/intent/plan.      Endorses feeling LESS overwhelmed, +chronic ruminative thinking (not worse), +feeling tense/"on edge"- "the divorce stuff"    PPHx:   Denies h/o self injury   Denies inpt psych hospitalization  Denies h/o suicide attempt     Current Psych Meds: lamictal 200mg po qam, lexapro 20mg po qam  Past Psych Meds: zoloft 100mg (ineffective), effexor 75mg po qam (through PCP, can't recall effect), trazodone (nightmares), wellbutrin xl 150mg (h/a's and tmj), klonopin, ambien (ineffective), lexapro (yrs ago, can't recall),  strattera 60mg po daily    PMHx: PCOS, AGUSTIN/cipap (doesn't wear), chronic sinusitis/deviated septum, morbid obesity    SubstHx:   T- none  E- quit 11/2015 due to migraines; h/o binge drinking in college  D- denies   Caffeine- minimal- sometimes tea    FamPHx: M-anxiety, hoarding; Br- autism spectrum; S- "hot mess; she has anxiety, depression but I think she has a personality d/o", F-PTSD, MDD following MI (2/2 Antonio Nam)     Musculoskeletal:  Muscle strength/Tone: no dyskinesia/ no tremor  Gait/Station- non antalgic, no assistance needed    There were no vitals taken for this visit.    MSE: appears stated age, well groomed, appropriate dress, morbidly obese- but weight loss has been noticeable, engages well with examiner. Good e/c. Speech reg rate and vol, nonpressured. Mood is "i'm good." Affect congruent- no tearfulness today, themes are future oriented and positive. Sensorium fully intact. Oriented to date/day/location, current events. Narrative memory intact. Intellectual function is avg based on vocab and basic fund of knowledge. Thought is c/l/gd. No tangentiality or circumstantiality. No FOI/JULIA. Denies SI/HI. Denies A/VH. No evidence of delusions. Insight fair and Judgment intact and in keeping with insight    Suicide Risk Assessment:   Protective- age, gender, no prior attempts, no " prior hospitalizations, no family h/o attempts, no ongoing substance abuse, no psychosis, , has children, denies SI/intent/plan, seeking treatment, access to treatment, future oriented, good primary support, no access to firearms    Risk- race, ongoing Axis I sxs    **Pt is at LOW imminent and long term risk of suicide given current risk factors.    Assessment:  38yo WF w h/o MDD recurrent moderate and SAE. Referred for txmt by therapist Dr.W Roman. On eval the pt has a h/o MDD recurrent moderate which is currently in remission on prozac 40mg po qam and SAE which continues. Also a h/o PTSD sxs from childhood bullying which has set a pervasive pattern of self loathing and shame for this pt- now evidencing itself in poor self care- poor attention to overall wellness. BMI >45. No acute safety concerns. Due to cont'd wgt gain her PCP started vyvanse 30mg po qam. Pt has had a 6lb wgt loss and is tolerating well, although bp was elevated at that appt. She reported improved anxiety but mood worsening. Asked the pt to make some dietary changes - she then presented and we called  for collateral- pt reported concern for bipolar II d/o. I am uncertain of this- think sxs more c/w personality structure however she and  motivated for trial of lamictal for txmt of bipolar II depression- pt identifying sxs c/w diag. Pt with limited effort at her own recovery- does not implement health lifestyle changes but is also no longer taking vyvanse. Then reported tolerating lamictal well-on 200mg- endorsed mood improvement and improved impulsivity. Have cont'd to encourage therapy. Pt with lots of room for beh changes. Last appt presented after 6mos w/o care- transitioned to a NP at [facility] and was diagnosed with ADHD and strattera was started, prozac discontinued. Pt reports good mood and anxiety stability BUT  has filed for divorce. Upsetting but pt handling quite well. Wants to come back to me mainly due to  "court and my credentials (as opposed to NP)- I have shared my concerns, but she also has psych testing through court and those results will likely be helpful regarding adhd? Certainly some sxs exist and have been consistent throughout txmt- will cont meds w/o change at this time as she is reporting stability DESPITE emotionally charged home environment. does cont therapy at [facility] [place] [place]. She clarified previous lamictal dose as 300mg (no ebm to support this dose for mood stability- encouraged to dec back to 200mg), alsarpita reported inc anxiety in context of legal proceedings and divorce. Will consider addition of ssri mgmt which is treatment of choice for anxiety but also for personality traits contributing to emotional reactivity- will dec lamictal initially and then reassess- will also cont strattera at this time. Now in ind therapy with [facility], marriage therapy at [facility], and had a psych eval in [place] at a court rec'd psychologist (end of July)- will cont med mgmt. Unfortunate motivator, but pt has really made some much needed behavioral interventions and now has 40lb wgt loss with improved sleep drive/sleep and better energy. Silver lining- she acknowledges these positives and I continue to reflect to the pt that in some regards this experience has forced her into some self reliance which has led to rushed maturation. She is thinking of future, financial needs and how to best lead life to cont parenting Susy well. Coping well- no clear need for ssri addition as previously considered. Today the home changes have occurred and led to emotionality. Alonzo has moved out and therefore split time with susy is leading to a great deal of grief- "i've seen her every day of her life until now." tearful- but less so today- we started lexapro for emotional reactivity and pt is tolerating it well and it seems to be helpful- will further titrate to 20mg po daily. 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 future oriented, themes are positive. Has engaged with a new therapist after hers relocated and had a non-compete with inability to notify of new location. In the interim I collaborated with her custody evaluator, , who has now rec'd pt be domiciliary parent with cont'd 50/50 custody- nice outcome for pt but likely to be contested by ex who was wanting sole custody.  being deposed over this new RE eval.     Pt late for appt today due to testing at school which ran long, apologetic but reports the need to re address adhd sxs and possible txmt. Does have a historical diagnosis although she did not benefit from strattera txmt in the past and today has elevated bp which would be a c/i to moving forward with stimulant. I'd like to reeval, but also we need more time than offered today due to pt's arrival time for appt- she expresses understanding of all of these parameters and we'll r/s for an appt soon for the purpose of adhd testing and discussion of meds. 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 We process a recent depostion of me/my records for which patient was in the room and heard my clinical thoughts of her. Pt stable and processing appropriately. Continues to be stable as she processes the necessary losses assoc with divorce. Divorce has been finalized! Pt now asking to make some med changes which had been put on hold due to this process. Will taper lamictal to 150mg po qam. No longer on lamictal- stable without. Continues to be stable. Now feeling more settled post divorce with her current work life and custody arrangement- doing well. Will transition care to . RTC 3mos in clinic.    Axis I: mood disorder nos (mdd in partial remission v bipolar " II d/o- per pt/ sx report); SAE; h/o ADHD  Axis II: cluster b traits  Axis III: morbid obesity (BMI 47), insulin insensitivity, PCOS, AGUSTIN on cpap  Axis IV: chronic health and mental health concerns  Axis V: GAF 60    Plan:    1. Cont buspar 5mg po bid prn anxiety  2. Cont Lexapro 20mg po qam  3. Encouraged the pt to cont to engage in a low carb, high protein diet, cont exercise  4. Pt continues regular therapy outside clinic  5. RTC 3-4mos in clinic- transition to     -Supportive therapy and psychoeducation provided  -R/B/SE's of medications discussed with the pt who expresses understanding and chooses to take medications as prescribed.   -Pt instructed to call clinic, 911 or go to nearest emergency room if sxs worsen or pt is in   crisis. The pt expresses understanding.

## 2022-09-26 ENCOUNTER — OFFICE VISIT (OUTPATIENT)
Dept: PSYCHIATRY | Facility: CLINIC | Age: 42
End: 2022-09-26
Payer: COMMERCIAL

## 2022-09-26 VITALS
WEIGHT: 293 LBS | SYSTOLIC BLOOD PRESSURE: 123 MMHG | HEART RATE: 81 BPM | HEIGHT: 65 IN | BODY MASS INDEX: 48.82 KG/M2 | DIASTOLIC BLOOD PRESSURE: 86 MMHG

## 2022-09-26 DIAGNOSIS — F41.1 GAD (GENERALIZED ANXIETY DISORDER): ICD-10-CM

## 2022-09-26 DIAGNOSIS — F90.0 ATTENTION DEFICIT HYPERACTIVITY DISORDER, INATTENTIVE TYPE: ICD-10-CM

## 2022-09-26 DIAGNOSIS — F39 MOOD DISORDER: Primary | ICD-10-CM

## 2022-09-26 PROCEDURE — 1159F MED LIST DOCD IN RCRD: CPT | Mod: CPTII,S$GLB,, | Performed by: PSYCHOLOGIST

## 2022-09-26 PROCEDURE — 3074F SYST BP LT 130 MM HG: CPT | Mod: CPTII,S$GLB,, | Performed by: PSYCHOLOGIST

## 2022-09-26 PROCEDURE — 3074F PR MOST RECENT SYSTOLIC BLOOD PRESSURE < 130 MM HG: ICD-10-PCS | Mod: CPTII,S$GLB,, | Performed by: PSYCHOLOGIST

## 2022-09-26 PROCEDURE — 3044F HG A1C LEVEL LT 7.0%: CPT | Mod: CPTII,S$GLB,, | Performed by: PSYCHOLOGIST

## 2022-09-26 PROCEDURE — 3079F DIAST BP 80-89 MM HG: CPT | Mod: CPTII,S$GLB,, | Performed by: PSYCHOLOGIST

## 2022-09-26 PROCEDURE — 3008F PR BODY MASS INDEX (BMI) DOCUMENTED: ICD-10-PCS | Mod: CPTII,S$GLB,, | Performed by: PSYCHOLOGIST

## 2022-09-26 PROCEDURE — 99999 PR PBB SHADOW E&M-EST. PATIENT-LVL III: ICD-10-PCS | Mod: PBBFAC,,, | Performed by: PSYCHOLOGIST

## 2022-09-26 PROCEDURE — 90833 PSYTX W PT W E/M 30 MIN: CPT | Mod: S$GLB,,, | Performed by: PSYCHOLOGIST

## 2022-09-26 PROCEDURE — 3008F BODY MASS INDEX DOCD: CPT | Mod: CPTII,S$GLB,, | Performed by: PSYCHOLOGIST

## 2022-09-26 PROCEDURE — 99214 PR OFFICE/OUTPT VISIT, EST, LEVL IV, 30-39 MIN: ICD-10-PCS | Mod: S$GLB,,, | Performed by: PSYCHOLOGIST

## 2022-09-26 PROCEDURE — 99214 OFFICE O/P EST MOD 30 MIN: CPT | Mod: S$GLB,,, | Performed by: PSYCHOLOGIST

## 2022-09-26 PROCEDURE — 99999 PR PBB SHADOW E&M-EST. PATIENT-LVL III: CPT | Mod: PBBFAC,,, | Performed by: PSYCHOLOGIST

## 2022-09-26 PROCEDURE — 3079F PR MOST RECENT DIASTOLIC BLOOD PRESSURE 80-89 MM HG: ICD-10-PCS | Mod: CPTII,S$GLB,, | Performed by: PSYCHOLOGIST

## 2022-09-26 PROCEDURE — 1159F PR MEDICATION LIST DOCUMENTED IN MEDICAL RECORD: ICD-10-PCS | Mod: CPTII,S$GLB,, | Performed by: PSYCHOLOGIST

## 2022-09-26 PROCEDURE — 90833 PR PSYCHOTHERAPY W/PATIENT W/E&M, 30 MIN (ADD ON): ICD-10-PCS | Mod: S$GLB,,, | Performed by: PSYCHOLOGIST

## 2022-09-26 PROCEDURE — 3044F PR MOST RECENT HEMOGLOBIN A1C LEVEL <7.0%: ICD-10-PCS | Mod: CPTII,S$GLB,, | Performed by: PSYCHOLOGIST

## 2022-09-26 NOTE — PROGRESS NOTES
Outpatient Psychiatry Follow-Up Visit    Clinical Status of Patient: Outpatient (Ambulatory)  09/26/2022     Chief Complaint: depression, anxiety     Interval History and Content of Current Session:  Interim Events/Subjective Report/Content of Current Session:  follow-up appointment with new provider as previous provider has relocated.    Pt is a 42 y/o female with past psychiatric hx of  who presents for follow-up treatment. 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 Pt reported that her baseline is a little anxiety. She stated that increased anxiety leads to depression. Pt reported propranolol helping reduce pulse and anxiety. Pt taking Ozempic for weight loss for about 5 weeks. PCOS dx in 20's. Pt reported current financial concerns. Pt stated that she is not sure if lexapro is working. Pt reported some depressed days, with decreased motivation and lethargy. Pt reported shared custody of child with ex-.     Past Psychiatric hx:  zoloft 100mg (ineffective), effexor 75mg po qam (through PCP, can't recall effect), trazodone (nightmares), wellbutrin xl 150mg (h/a's and tmj), klonopin, ambien (ineffective), lexapro (yrs ago, can't recall),  strattera 60mg po daily, lamotrigine, fluoxetine (smells increased),     Past Medical hx:   Past Medical History:   Diagnosis Date    Allergy     Anxiety     Colon polyps     CPAP (continuous positive airway pressure) dependence     Depression     Diverticulitis     GERD (gastroesophageal reflux disease)     Hiatal hernia with GERD without esophagitis 07/14/2016    EGD per Dr. Narciso Siddiqi    Hypertension     Polycystic ovarian syndrome     PONV (postoperative nausea and vomiting)     Rectal bleeding     Sleep apnea         Interim hx:  Medication changes last visit: First visit with this provider    Anxiety: stable  Depression: increased     Denies suicidal/homicidal ideations.  Denies hopelessness/worthlessness.    Denies auditory/visual hallucinations       SubstHx:   Tobacco - Pt denies  Etoh - quit 11/2015 due to migraines; h/o binge drinking in college  Drugs - Pt denies   Caffeine- minimal- sometimes tea      Review of Systems   PSYCHIATRIC: Pertinent items are noted in the narrative.        CONSTITUTIONAL: weight stable    Past Medical, Family and Social History: The patient's past medical, family and social history have been reviewed and updated as appropriate within the electronic medical record. See encounter notes.     Current Psychiatric Medication:  Buspar 5 mg BID, Lexapro 20 mg.     Compliance: yes      Side effects: Pt denied     Risk Parameters:  Patient reports no suicidal ideation  Patient reports no homicidal ideation  Patient reports no self-injurious behavior  Patient reports no violent behavior     Exam (detailed: at least 9 elements; comprehensive: all 15 elements)   Constitutional  Vitals:  Most recent vital signs, dated less than 90 days prior to this appointment, were reviewed. BP: ()/()   Arterial Line BP: ()/()       General:  unremarkable, age appropriate, casual attire, good eye contact, good rapport       Musculoskeletal  Muscle Strength/Tone:  no flaccidity, no tremor    Gait & Station:  normal      Psychiatric                       Speech:  normal tone, normal rate, rhythm, and volume   Mood & Affect:   Depressed, anxious         Thought Process:   Goal directed; Linear    Associations:   intact   Thought Content:   No SI/HI, delusions, or paranoia, no AV/VH   Insight & Judgement:   Good, adequate to circumstances   Orientation:   grossly intact; alert and oriented x 4    Memory:  intact for content of interview    Language:  grossly intact, can repeat    Attention Span  : Grossly intact for content of interview   Fund of Knowledge:   intact and appropriate to age and level of education        Assessment and Diagnosis   Status/Progress: Increased depression. Anxiety stable.      Impression: Pt reported some increase in depression sxs.  Pt has failed trials with several antidepressants. Will trial duloxetine.     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 Cont buspar 5 mg po bid prn (about 1x per week) anxiety - will start taking more, increase to BID    2. D/C Lexapro 20 mg po qam cross taper to start duloxetine 40 mg     3. Call to report any worsening of symptoms or problems with the medication. Pt instructed to go to ER with thoughts of harming self, others     4. Patient given contact # for psychotherapists at [facility] and also instructed to check with insurance for list of providers.     Psychotherapy: 20 minutes  Target symptoms: depression  Why chosen therapy is appropriate versus another modality: CBT used; relevant to diagnosis, patient responds to this modality  Outcome monitoring methods: self-report, observation  Therapeutic intervention type: Cognitive Behavioral Therapy  Topics discussed/themes: building skills sets for symptom management, symptom recognition, nutrition, exercise  The patient's response to the intervention is accepting  Patient's response to treatment is: good.   The patient's progress toward treatment goals: limited      Return to clinic: 1 month    -Cognitive-Behavioral/Supportive therapy and psychoeducation provided  -R/B/SE's of medications discussed with the pt who expresses understanding and chooses to take medications as prescribed.   -Pt instructed to call clinic, 911 or go to nearest emergency room if sxs worsen or pt is in   crisis. The pt expresses understanding.    Carroll Trent, PhD, MP     Antidepressant/Antianxiety Medication Initiation:  Patient informed of risks, benefits, and potential side effects of medication and accepts informed consent.  Common side  effects include nausea, fatigue, headache, insomnia., Specifically discussed the possibility of new or worsening suicidal thoughts/depression.  Patient instructed to stop the medication immediately and seek urgent treatment if this occurs. Patient instructed not to abruptly discontinue medication without physician guidance except in cases of sudden onset or worsening of SI.       Pregnancy Warning:  Patient denies current pregnancy possibility.  Patient made aware that medications have not been proven safe in pregnancy and that she must maintain adequate birth control.  Patient instructed to alert us immediately if she becomes pregnant.

## 2022-09-29 ENCOUNTER — PATIENT MESSAGE (OUTPATIENT)
Dept: PSYCHIATRY | Facility: CLINIC | Age: 42
End: 2022-09-29
Payer: COMMERCIAL

## 2022-09-29 RX ORDER — DULOXETIN HYDROCHLORIDE 20 MG/1
40 CAPSULE, DELAYED RELEASE ORAL DAILY
Qty: 60 CAPSULE | Refills: 1 | Status: SHIPPED | OUTPATIENT
Start: 2022-09-29 | End: 2022-11-01

## 2022-09-29 RX ORDER — BUSPIRONE HYDROCHLORIDE 5 MG/1
5 TABLET ORAL 2 TIMES DAILY
Qty: 60 TABLET | Refills: 1 | Status: SHIPPED | OUTPATIENT
Start: 2022-09-29 | End: 2022-11-01

## 2022-10-21 ENCOUNTER — PATIENT MESSAGE (OUTPATIENT)
Dept: PSYCHIATRY | Facility: CLINIC | Age: 42
End: 2022-10-21
Payer: COMMERCIAL

## 2022-10-31 ENCOUNTER — PATIENT MESSAGE (OUTPATIENT)
Dept: PSYCHIATRY | Facility: CLINIC | Age: 42
End: 2022-10-31
Payer: COMMERCIAL

## 2022-10-31 NOTE — PROGRESS NOTES
Outpatient Psychiatry Follow-Up Visit    Clinical Status of Patient: Outpatient (Ambulatory)  10/31/2022     Chief Complaint: depression, anxiety     Interval History and Content of Current Session:  Interim Events/Subjective Report/Content of Current Session:  follow-up appointment with new provider as previous provider has relocated.    Pt is a 42 y/o female with past psychiatric hx of  who presents for follow-up treatment. Pt reported that she has not noticed much difference from transitioning to duloxetine. She continues to have some depression symptoms. Pt noted that she has also increased the buspar to BID with positive results. Pt reported some irritability at work and setting boundaries with management. Pt stated that she is considering alternative employment.      Past Psychiatric hx:  zoloft 100mg (ineffective), effexor 75mg po qam (through PCP, can't recall effect), trazodone (nightmares), wellbutrin xl 150mg (h/a's and tmj), klonopin, ambien (ineffective), strattera 60mg po daily, lamotrigine, fluoxetine (smells increased), Lexapro 20 mg    Past Medical hx:   Past Medical History:   Diagnosis Date    Allergy     Anxiety     Colon polyps     CPAP (continuous positive airway pressure) dependence     Depression     Diverticulitis     GERD (gastroesophageal reflux disease)     Hiatal hernia with GERD without esophagitis 07/14/2016    EGD per Dr. Narciso Siddiqi    Hypertension     Polycystic ovarian syndrome     PONV (postoperative nausea and vomiting)     Rectal bleeding     Sleep apnea         Interim hx:  Medication changes last visit: D/C Lexapro 20 mg po qam cross taper to start duloxetine 40 mg  Anxiety: stable  Depression: increased     Denies suicidal/homicidal ideations.  Denies hopelessness/worthlessness.    Denies auditory/visual hallucinations      SubstHx:   Tobacco - Pt denies  Etoh - quit 11/2015 due to migraines; h/o binge drinking in college  Drugs - Pt denies   Caffeine- minimal- sometimes  tea      Review of Systems   PSYCHIATRIC: Pertinent items are noted in the narrative.        CONSTITUTIONAL: weight stable    Past Medical, Family and Social History: The patient's past medical, family and social history have been reviewed and updated as appropriate within the electronic medical record. See encounter notes.     Current Psychiatric Medication:  Buspar 5 mg BID, duloxetine 40 mg.     Compliance: yes      Side effects: Pt denied     Risk Parameters:  Patient reports no suicidal ideation  Patient reports no homicidal ideation  Patient reports no self-injurious behavior  Patient reports no violent behavior     Exam (detailed: at least 9 elements; comprehensive: all 15 elements)   Constitutional  Vitals:  Most recent vital signs, dated less than 90 days prior to this appointment, were reviewed. BP: ()/()   Arterial Line BP: ()/()       General:  unremarkable, age appropriate, casual attire, good eye contact, good rapport       Musculoskeletal  Muscle Strength/Tone:  no flaccidity, no tremor    Gait & Station:  normal      Psychiatric                       Speech:  normal tone, normal rate, rhythm, and volume   Mood & Affect:   Depressed, anxious         Thought Process:   Goal directed; Linear    Associations:   intact   Thought Content:   No SI/HI, delusions, or paranoia, no AV/VH   Insight & Judgement:   Good, adequate to circumstances   Orientation:   grossly intact; alert and oriented x 4    Memory:  intact for content of interview    Language:  grossly intact, can repeat    Attention Span  : Grossly intact for content of interview   Fund of Knowledge:   intact and appropriate to age and level of education        Assessment and Diagnosis   Status/Progress: Based on the examination today, the patient's problem(s) is/are not adequately controlled. New problems have not been presented today. Co-morbidities are not complicating management of the primary condition. There are no active rule-out diagnoses  for this patient at this time.      Impression: Pt continues to experience some depression symptoms and irritability. Will increase duloxetine dosage and monitor symptoms moving forward.     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 Cont buspar 5 mg po bid prn (about 1x per week) anxiety - will start taking more, increase to BID    2. Increase duloxetine to 60 mg     3. Call to report any worsening of symptoms or problems with the medication. Pt instructed to go to ER with thoughts of harming self, others     4. Patient given contact # for psychotherapists at [facility] and also instructed to check with insurance for list of providers.     Psychotherapy: 20 minutes  Target symptoms: depression  Why chosen therapy is appropriate versus another modality: CBT used; relevant to diagnosis, patient responds to this modality  Outcome monitoring methods: self-report, observation  Therapeutic intervention type: Cognitive Behavioral Therapy  Topics discussed/themes: building skills sets for symptom management, symptom recognition, nutrition, exercise  The patient's response to the intervention is accepting  Patient's response to treatment is: good.   The patient's progress toward treatment goals: limited      Return to clinic: 1 month    -Cognitive-Behavioral/Supportive therapy and psychoeducation provided  -R/B/SE's of medications discussed with the pt who expresses understanding and chooses to take medications as prescribed.   -Pt instructed to call clinic, 911 or go to nearest emergency room if sxs worsen or pt is in   crisis. The pt expresses understanding.    Carroll Trent, PhD, MP     Antidepressant/Antianxiety Medication Initiation:  Patient informed of risks, benefits, and  potential side effects of medication and accepts informed consent.  Common side effects include nausea, fatigue, headache, insomnia., Specifically discussed the possibility of new or worsening suicidal thoughts/depression.  Patient instructed to stop the medication immediately and seek urgent treatment if this occurs. Patient instructed not to abruptly discontinue medication without physician guidance except in cases of sudden onset or worsening of SI.       Pregnancy Warning:  Patient denies current pregnancy possibility.  Patient made aware that medications have not been proven safe in pregnancy and that she must maintain adequate birth control.  Patient instructed to alert us immediately if she becomes pregnant.

## 2022-11-01 ENCOUNTER — OFFICE VISIT (OUTPATIENT)
Dept: PSYCHIATRY | Facility: CLINIC | Age: 42
End: 2022-11-01
Payer: COMMERCIAL

## 2022-11-01 VITALS
DIASTOLIC BLOOD PRESSURE: 86 MMHG | WEIGHT: 293 LBS | BODY MASS INDEX: 47.09 KG/M2 | SYSTOLIC BLOOD PRESSURE: 126 MMHG | HEIGHT: 66 IN | HEART RATE: 77 BPM

## 2022-11-01 DIAGNOSIS — F41.1 GAD (GENERALIZED ANXIETY DISORDER): ICD-10-CM

## 2022-11-01 DIAGNOSIS — F39 MOOD DISORDER: Primary | ICD-10-CM

## 2022-11-01 PROCEDURE — 3079F PR MOST RECENT DIASTOLIC BLOOD PRESSURE 80-89 MM HG: ICD-10-PCS | Mod: CPTII,S$GLB,, | Performed by: PSYCHOLOGIST

## 2022-11-01 PROCEDURE — 1159F PR MEDICATION LIST DOCUMENTED IN MEDICAL RECORD: ICD-10-PCS | Mod: CPTII,S$GLB,, | Performed by: PSYCHOLOGIST

## 2022-11-01 PROCEDURE — 99999 PR PBB SHADOW E&M-EST. PATIENT-LVL III: ICD-10-PCS | Mod: PBBFAC,,, | Performed by: PSYCHOLOGIST

## 2022-11-01 PROCEDURE — 1159F MED LIST DOCD IN RCRD: CPT | Mod: CPTII,S$GLB,, | Performed by: PSYCHOLOGIST

## 2022-11-01 PROCEDURE — 3074F PR MOST RECENT SYSTOLIC BLOOD PRESSURE < 130 MM HG: ICD-10-PCS | Mod: CPTII,S$GLB,, | Performed by: PSYCHOLOGIST

## 2022-11-01 PROCEDURE — 3008F BODY MASS INDEX DOCD: CPT | Mod: CPTII,S$GLB,, | Performed by: PSYCHOLOGIST

## 2022-11-01 PROCEDURE — 99214 PR OFFICE/OUTPT VISIT, EST, LEVL IV, 30-39 MIN: ICD-10-PCS | Mod: S$GLB,,, | Performed by: PSYCHOLOGIST

## 2022-11-01 PROCEDURE — 99214 OFFICE O/P EST MOD 30 MIN: CPT | Mod: S$GLB,,, | Performed by: PSYCHOLOGIST

## 2022-11-01 PROCEDURE — 3074F SYST BP LT 130 MM HG: CPT | Mod: CPTII,S$GLB,, | Performed by: PSYCHOLOGIST

## 2022-11-01 PROCEDURE — 90833 PSYTX W PT W E/M 30 MIN: CPT | Mod: S$GLB,,, | Performed by: PSYCHOLOGIST

## 2022-11-01 PROCEDURE — 3044F PR MOST RECENT HEMOGLOBIN A1C LEVEL <7.0%: ICD-10-PCS | Mod: CPTII,S$GLB,, | Performed by: PSYCHOLOGIST

## 2022-11-01 PROCEDURE — 3044F HG A1C LEVEL LT 7.0%: CPT | Mod: CPTII,S$GLB,, | Performed by: PSYCHOLOGIST

## 2022-11-01 PROCEDURE — 90833 PR PSYCHOTHERAPY W/PATIENT W/E&M, 30 MIN (ADD ON): ICD-10-PCS | Mod: S$GLB,,, | Performed by: PSYCHOLOGIST

## 2022-11-01 PROCEDURE — 3008F PR BODY MASS INDEX (BMI) DOCUMENTED: ICD-10-PCS | Mod: CPTII,S$GLB,, | Performed by: PSYCHOLOGIST

## 2022-11-01 PROCEDURE — 3079F DIAST BP 80-89 MM HG: CPT | Mod: CPTII,S$GLB,, | Performed by: PSYCHOLOGIST

## 2022-11-01 PROCEDURE — 99999 PR PBB SHADOW E&M-EST. PATIENT-LVL III: CPT | Mod: PBBFAC,,, | Performed by: PSYCHOLOGIST

## 2022-11-01 RX ORDER — DULOXETIN HYDROCHLORIDE 60 MG/1
60 CAPSULE, DELAYED RELEASE ORAL DAILY
Qty: 30 CAPSULE | Refills: 1 | Status: SHIPPED | OUTPATIENT
Start: 2022-11-01 | End: 2022-12-06 | Stop reason: SDUPTHER

## 2022-11-01 RX ORDER — BUSPIRONE HYDROCHLORIDE 10 MG/1
10 TABLET ORAL 3 TIMES DAILY
Qty: 90 TABLET | Refills: 1 | Status: SHIPPED | OUTPATIENT
Start: 2022-11-01 | End: 2022-12-06 | Stop reason: SDUPTHER

## 2022-12-05 ENCOUNTER — PATIENT MESSAGE (OUTPATIENT)
Dept: PSYCHIATRY | Facility: CLINIC | Age: 42
End: 2022-12-05
Payer: COMMERCIAL

## 2022-12-06 ENCOUNTER — PATIENT MESSAGE (OUTPATIENT)
Dept: PSYCHIATRY | Facility: CLINIC | Age: 42
End: 2022-12-06

## 2022-12-06 ENCOUNTER — OFFICE VISIT (OUTPATIENT)
Dept: PSYCHIATRY | Facility: CLINIC | Age: 42
End: 2022-12-06
Payer: COMMERCIAL

## 2022-12-06 VITALS
WEIGHT: 293 LBS | SYSTOLIC BLOOD PRESSURE: 142 MMHG | BODY MASS INDEX: 52.49 KG/M2 | DIASTOLIC BLOOD PRESSURE: 90 MMHG | HEART RATE: 91 BPM

## 2022-12-06 DIAGNOSIS — F90.2 ATTENTION DEFICIT HYPERACTIVITY DISORDER (ADHD), COMBINED TYPE: ICD-10-CM

## 2022-12-06 DIAGNOSIS — F39 MOOD DISORDER: Primary | ICD-10-CM

## 2022-12-06 DIAGNOSIS — F41.1 GAD (GENERALIZED ANXIETY DISORDER): ICD-10-CM

## 2022-12-06 DIAGNOSIS — F41.1 GENERALIZED ANXIETY DISORDER: ICD-10-CM

## 2022-12-06 PROCEDURE — 99214 OFFICE O/P EST MOD 30 MIN: CPT | Mod: S$GLB,,, | Performed by: PSYCHOLOGIST

## 2022-12-06 PROCEDURE — 1159F PR MEDICATION LIST DOCUMENTED IN MEDICAL RECORD: ICD-10-PCS | Mod: CPTII,S$GLB,, | Performed by: PSYCHOLOGIST

## 2022-12-06 PROCEDURE — 3080F DIAST BP >= 90 MM HG: CPT | Mod: CPTII,S$GLB,, | Performed by: PSYCHOLOGIST

## 2022-12-06 PROCEDURE — 3077F SYST BP >= 140 MM HG: CPT | Mod: CPTII,S$GLB,, | Performed by: PSYCHOLOGIST

## 2022-12-06 PROCEDURE — 3044F HG A1C LEVEL LT 7.0%: CPT | Mod: CPTII,S$GLB,, | Performed by: PSYCHOLOGIST

## 2022-12-06 PROCEDURE — 3008F PR BODY MASS INDEX (BMI) DOCUMENTED: ICD-10-PCS | Mod: CPTII,S$GLB,, | Performed by: PSYCHOLOGIST

## 2022-12-06 PROCEDURE — 3080F PR MOST RECENT DIASTOLIC BLOOD PRESSURE >= 90 MM HG: ICD-10-PCS | Mod: CPTII,S$GLB,, | Performed by: PSYCHOLOGIST

## 2022-12-06 PROCEDURE — 99999 PR PBB SHADOW E&M-EST. PATIENT-LVL III: CPT | Mod: PBBFAC,,, | Performed by: PSYCHOLOGIST

## 2022-12-06 PROCEDURE — 90833 PR PSYCHOTHERAPY W/PATIENT W/E&M, 30 MIN (ADD ON): ICD-10-PCS | Mod: S$GLB,,, | Performed by: PSYCHOLOGIST

## 2022-12-06 PROCEDURE — 3044F PR MOST RECENT HEMOGLOBIN A1C LEVEL <7.0%: ICD-10-PCS | Mod: CPTII,S$GLB,, | Performed by: PSYCHOLOGIST

## 2022-12-06 PROCEDURE — 1159F MED LIST DOCD IN RCRD: CPT | Mod: CPTII,S$GLB,, | Performed by: PSYCHOLOGIST

## 2022-12-06 PROCEDURE — 99999 PR PBB SHADOW E&M-EST. PATIENT-LVL III: ICD-10-PCS | Mod: PBBFAC,,, | Performed by: PSYCHOLOGIST

## 2022-12-06 PROCEDURE — 99214 PR OFFICE/OUTPT VISIT, EST, LEVL IV, 30-39 MIN: ICD-10-PCS | Mod: S$GLB,,, | Performed by: PSYCHOLOGIST

## 2022-12-06 PROCEDURE — 3008F BODY MASS INDEX DOCD: CPT | Mod: CPTII,S$GLB,, | Performed by: PSYCHOLOGIST

## 2022-12-06 PROCEDURE — 3077F PR MOST RECENT SYSTOLIC BLOOD PRESSURE >= 140 MM HG: ICD-10-PCS | Mod: CPTII,S$GLB,, | Performed by: PSYCHOLOGIST

## 2022-12-06 PROCEDURE — 90833 PSYTX W PT W E/M 30 MIN: CPT | Mod: S$GLB,,, | Performed by: PSYCHOLOGIST

## 2022-12-06 RX ORDER — BUSPIRONE HYDROCHLORIDE 10 MG/1
10 TABLET ORAL 3 TIMES DAILY
Qty: 270 TABLET | Refills: 1 | Status: SHIPPED | OUTPATIENT
Start: 2022-12-06 | End: 2023-04-04 | Stop reason: SDUPTHER

## 2022-12-06 RX ORDER — DULOXETIN HYDROCHLORIDE 60 MG/1
60 CAPSULE, DELAYED RELEASE ORAL DAILY
Qty: 90 CAPSULE | Refills: 1 | Status: SHIPPED | OUTPATIENT
Start: 2022-12-06 | End: 2023-03-02

## 2022-12-06 NOTE — PROGRESS NOTES
Outpatient Psychiatry Follow-Up Visit    Clinical Status of Patient: Outpatient (Ambulatory)  12/06/2022     Chief Complaint: depression, anxiety     Interval History and Content of Current Session:  Interim Events/Subjective Report/Content of Current Session:  follow-up appointment with new provider as previous provider has relocated.    Pt is a 42 y/o female with past psychiatric hx of  who presents for follow-up treatment. Pt reported that the increase in duloxetine is helping. Noted increase in mood and motivation. Pt also reported that work related stress has decreased. Pt is still in the process of getting a new job but is concerned about back ground checks and statements from her ex-. Pt taking the buspirone qhs, which she reported is reducing perseverative thoughts that prevent sleep, and PRN during the day.    Past Psychiatric hx:  zoloft 100mg (ineffective), effexor 75mg po qam (through PCP, can't recall effect), trazodone (nightmares), wellbutrin xl 150mg (h/a's and tmj), klonopin, ambien (ineffective), strattera 60mg po daily, lamotrigine, fluoxetine (smells increased), Lexapro 20 mg    Past Medical hx:   Past Medical History:   Diagnosis Date    Allergy     Anxiety     Colon polyps     CPAP (continuous positive airway pressure) dependence     Depression     Diverticulitis     GERD (gastroesophageal reflux disease)     Hiatal hernia with GERD without esophagitis 07/14/2016    EGD per Dr. Narciso Siddiqi    Hypertension     Polycystic ovarian syndrome     PONV (postoperative nausea and vomiting)     Rectal bleeding     Sleep apnea         Interim hx:  Medication changes last visit: Increase duloxetine to 60 mg  Anxiety: stable  Depression: decreased     Denies suicidal/homicidal ideations.  Denies hopelessness/worthlessness.    Denies auditory/visual hallucinations      SubstHx:   Tobacco - Pt denies  Etoh - quit 11/2015 due to migraines; h/o binge drinking in college  Drugs - Pt denies   Caffeine-  minimal- sometimes tea      Review of Systems   PSYCHIATRIC: Pertinent items are noted in the narrative.        CONSTITUTIONAL: weight stable    Past Medical, Family and Social History: The patient's past medical, family and social history have been reviewed and updated as appropriate within the electronic medical record. See encounter notes.     Current Psychiatric Medication:  Buspar 10 mg BID, duloxetine 60 mg.     Compliance: yes      Side effects: Pt denied     Risk Parameters:  Patient reports no suicidal ideation  Patient reports no homicidal ideation  Patient reports no self-injurious behavior  Patient reports no violent behavior     Exam (detailed: at least 9 elements; comprehensive: all 15 elements)   Constitutional  Vitals:  Most recent vital signs, dated less than 90 days prior to this appointment, were reviewed.        General:  unremarkable, age appropriate, casual attire, good eye contact, good rapport       Musculoskeletal  Muscle Strength/Tone:  no flaccidity, no tremor    Gait & Station:  normal      Psychiatric                       Speech:  normal tone, normal rate, rhythm, and volume   Mood & Affect:   Depressed, anxious         Thought Process:   Goal directed; Linear    Associations:   intact   Thought Content:   No SI/HI, delusions, or paranoia, no AV/VH   Insight & Judgement:   Good, adequate to circumstances   Orientation:   grossly intact; alert and oriented x 4    Memory:  intact for content of interview    Language:  grossly intact, can repeat    Attention Span  : Grossly intact for content of interview   Fund of Knowledge:   intact and appropriate to age and level of education        Assessment and Diagnosis   Status/Progress: Based on the examination today, the patient's problem(s) is/are adequately controlled. New problems have not been presented today. Co-morbidities are not complicating management of the primary condition. There are active rule-out diagnoses for this patient at this  time.      Impression: Pt appears to have responded well to the increase in duloxetine. Will continue as is and monitor moving forward.     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 Cont buspar 10 mg po bid (taking as 10 mg qhs, and 10 mg PRN)    2. Cont duloxetine to 60 mg     3. Call to report any worsening of symptoms or problems with the medication. Pt instructed to go to ER with thoughts of harming self, others     4. Patient given contact # for psychotherapists at [facility] and also instructed to check with insurance for list of providers.     Psychotherapy: 20 minutes  Target symptoms: depression  Why chosen therapy is appropriate versus another modality: CBT used; relevant to diagnosis, patient responds to this modality  Outcome monitoring methods: self-report, observation  Therapeutic intervention type: Cognitive Behavioral Therapy  Topics discussed/themes: building skills sets for symptom management, symptom recognition, nutrition, exercise  The patient's response to the intervention is accepting  Patient's response to treatment is: good.   The patient's progress toward treatment goals: limited      Return to clinic: 2 months    -Cognitive-Behavioral/Supportive therapy and psychoeducation provided  -R/B/SE's of medications discussed with the pt who expresses understanding and chooses to take medications as prescribed.   -Pt instructed to call clinic, 911 or go to nearest emergency room if sxs worsen or pt is in   crisis. The pt expresses understanding.    Carroll Trent, PhD, MP     Antidepressant/Antianxiety Medication Initiation:  Patient informed of risks, benefits, and potential side effects of medication and accepts informed consent.  Common side effects include  nausea, fatigue, headache, insomnia., Specifically discussed the possibility of new or worsening suicidal thoughts/depression.  Patient instructed to stop the medication immediately and seek urgent treatment if this occurs. Patient instructed not to abruptly discontinue medication without physician guidance except in cases of sudden onset or worsening of SI.       Pregnancy Warning:  Patient denies current pregnancy possibility.  Patient made aware that medications have not been proven safe in pregnancy and that she must maintain adequate birth control.  Patient instructed to alert us immediately if she becomes pregnant.

## 2023-02-07 ENCOUNTER — OFFICE VISIT (OUTPATIENT)
Dept: PSYCHIATRY | Facility: CLINIC | Age: 43
End: 2023-02-07
Payer: COMMERCIAL

## 2023-02-07 DIAGNOSIS — F90.0 ATTENTION DEFICIT HYPERACTIVITY DISORDER, INATTENTIVE TYPE: ICD-10-CM

## 2023-02-07 DIAGNOSIS — F41.1 GENERALIZED ANXIETY DISORDER: ICD-10-CM

## 2023-02-07 DIAGNOSIS — F39 MOOD DISORDER: Primary | ICD-10-CM

## 2023-02-07 PROCEDURE — 90833 PSYTX W PT W E/M 30 MIN: CPT | Mod: 95,,, | Performed by: PSYCHOLOGIST

## 2023-02-07 PROCEDURE — 1159F MED LIST DOCD IN RCRD: CPT | Mod: CPTII,95,, | Performed by: PSYCHOLOGIST

## 2023-02-07 PROCEDURE — 99214 OFFICE O/P EST MOD 30 MIN: CPT | Mod: 95,,, | Performed by: PSYCHOLOGIST

## 2023-02-07 PROCEDURE — 1159F PR MEDICATION LIST DOCUMENTED IN MEDICAL RECORD: ICD-10-PCS | Mod: CPTII,95,, | Performed by: PSYCHOLOGIST

## 2023-02-07 PROCEDURE — 99214 PR OFFICE/OUTPT VISIT, EST, LEVL IV, 30-39 MIN: ICD-10-PCS | Mod: 95,,, | Performed by: PSYCHOLOGIST

## 2023-02-07 PROCEDURE — 90833 PR PSYCHOTHERAPY W/PATIENT W/E&M, 30 MIN (ADD ON): ICD-10-PCS | Mod: 95,,, | Performed by: PSYCHOLOGIST

## 2023-02-07 NOTE — PROGRESS NOTES
"The patient location is: [place], [place]  The patient location [place] is: [place]  The patient phone number is: [phone]  Visit type: Virtual visit with audio  Each patient to whom he or she provides medical services by telemedicine is:  (1) informed of the relationship between the physician and patient and the respective role of any other health care provider with respect to management of the patient; and (2) notified that he or she may decline to receive medical services by telemedicine and may withdraw from such care at any time.    The reason for the audio only service rather than synchronous audio and video virtual visit was related to technical difficulties or patient preference/necessity.      Outpatient Psychiatry Follow-Up Visit    Clinical Status of Patient: Outpatient (Ambulatory)  02/07/2023     Chief Complaint: depression, anxiety     Interval History and Content of Current Session:  Interim Events/Subjective Report/Content of Current Session:  follow-up appointment with new provider as previous provider has relocated.    Pt is a 42 y/o female with past psychiatric hx of  who presents for follow-up treatment. Pt reported that holidays went well. Spent time with family and adopted a couple of cats. Pt reported that she has experienced some dip in mood. Pt stated that her thoughts get "tangled" leading to increased stress. Gave some example of staying on task at work. 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 Continues to experience financial concerns but developing plans to manage this.     Past Psychiatric hx:  zoloft 100mg (ineffective), effexor 75mg po qam (through PCP, can't recall effect), trazodone (nightmares), wellbutrin xl 150mg (h/a's and tmj), klonopin, ambien (ineffective), strattera 60mg po daily, lamotrigine, fluoxetine (smells increased), Lexapro 20 mg    Past Medical hx:   Past Medical History:   Diagnosis Date    Allergy     Anxiety     Colon polyps     " CPAP (continuous positive airway pressure) dependence     Depression     Diverticulitis     GERD (gastroesophageal reflux disease)     Hiatal hernia with GERD without esophagitis 07/14/2016    EGD per Dr. Narciso Siddiqi    Hypertension     Polycystic ovarian syndrome     PONV (postoperative nausea and vomiting)     Rectal bleeding     Sleep apnea         Interim hx:  Medication changes last visit: None  Anxiety: stable  Depression: decreased     Denies suicidal/homicidal ideations.  Denies hopelessness/worthlessness.    Denies auditory/visual hallucinations      SubstHx:   Tobacco - Pt denies  Etoh - quit 11/2015 due to migraines; h/o binge drinking in college  Drugs - Pt denies   Caffeine- minimal- sometimes tea      Review of Systems   PSYCHIATRIC: Pertinent items are noted in the narrative.        CONSTITUTIONAL: weight stable    Past Medical, Family and Social History: The patient's past medical, family and social history have been reviewed and updated as appropriate within the electronic medical record. See encounter notes.     Current Psychiatric Medication:  Buspar 10 mg BID, duloxetine 60 mg.     Compliance: yes      Side effects: Pt denied     Risk Parameters:  Patient reports no suicidal ideation  Patient reports no homicidal ideation  Patient reports no self-injurious behavior  Patient reports no violent behavior     Exam (detailed: at least 9 elements; comprehensive: all 15 elements)   Constitutional  Vitals:  Most recent vital signs, dated less than 90 days prior to this appointment, were reviewed. BP: ()/()   Arterial Line BP: ()/()       General:  unremarkable, age appropriate, casual attire, good eye contact, good rapport       Musculoskeletal  Muscle Strength/Tone:  no flaccidity, no tremor    Gait & Station:  normal      Psychiatric                       Speech:  normal tone, normal rate, rhythm, and volume   Mood & Affect:   Depressed, anxious         Thought Process:   Goal directed; Linear     Associations:   intact   Thought Content:   No SI/HI, delusions, or paranoia, no AV/VH   Insight & Judgement:   Good, adequate to circumstances   Orientation:   grossly intact; alert and oriented x 4    Memory:  intact for content of interview    Language:  grossly intact, can repeat    Attention Span  : Grossly intact for content of interview   Fund of Knowledge:   intact and appropriate to age and level of education        Assessment and Diagnosis   Status/Progress: Based on the examination today, the patient's problem(s) is/are adequately controlled. New problems have not been presented today. Co-morbidities are not complicating management of the primary condition. There are active rule-out diagnoses for this patient at this time.      Impression: Pt appears to have some mood symptoms but can identify specific psychosocial stressors contributing to them. Discussed options of therapy but insurance is a barrier. Will continue with medication plan as is and increase coping/psychosocial interventions.     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 Cont buspar 10 mg po bid (taking as 10 mg qhs, and 10 mg PRN)    2. Cont duloxetine to 60 mg     3. Call to report any worsening of symptoms or problems with the medication. Pt instructed to go to ER with thoughts of harming self, others     4. Patient given contact # for psychotherapists at [facility] and also instructed to check with insurance for list of providers.     Psychotherapy: 20 minutes  Target symptoms: depression  Why chosen therapy is appropriate versus another modality: CBT used; relevant to diagnosis, patient responds to this modality  Outcome monitoring methods: self-report, observation  Therapeutic intervention type:  Cognitive Behavioral Therapy  Topics discussed/themes: building skills sets for symptom management, symptom recognition, nutrition, exercise  The patient's response to the intervention is accepting  Patient's response to treatment is: good.   The patient's progress toward treatment goals: limited      Return to clinic: 2 months    -Cognitive-Behavioral/Supportive therapy and psychoeducation provided  -R/B/SE's of medications discussed with the pt who expresses understanding and chooses to take medications as prescribed.   -Pt instructed to call clinic, 911 or go to nearest emergency room if sxs worsen or pt is in   crisis. The pt expresses understanding.    Carroll Trent, PhD, MP     Antidepressant/Antianxiety Medication Initiation:  Patient informed of risks, benefits, and potential side effects of medication and accepts informed consent.  Common side effects include nausea, fatigue, headache, insomnia., Specifically discussed the possibility of new or worsening suicidal thoughts/depression.  Patient instructed to stop the medication immediately and seek urgent treatment if this occurs. Patient instructed not to abruptly discontinue medication without physician guidance except in cases of sudden onset or worsening of SI.       Pregnancy Warning:  Patient denies current pregnancy possibility.  Patient made aware that medications have not been proven safe in pregnancy and that she must maintain adequate birth control.  Patient instructed to alert us immediately if she becomes pregnant.

## 2023-04-04 ENCOUNTER — OFFICE VISIT (OUTPATIENT)
Dept: PSYCHIATRY | Facility: CLINIC | Age: 43
End: 2023-04-04
Payer: COMMERCIAL

## 2023-04-04 ENCOUNTER — PATIENT MESSAGE (OUTPATIENT)
Dept: PSYCHIATRY | Facility: CLINIC | Age: 43
End: 2023-04-04

## 2023-04-04 DIAGNOSIS — F41.1 GAD (GENERALIZED ANXIETY DISORDER): ICD-10-CM

## 2023-04-04 DIAGNOSIS — F39 MOOD DISORDER: ICD-10-CM

## 2023-04-04 DIAGNOSIS — F90.9 ATTENTION DEFICIT HYPERACTIVITY DISORDER (ADHD), UNSPECIFIED ADHD TYPE: Primary | ICD-10-CM

## 2023-04-04 PROCEDURE — 1159F PR MEDICATION LIST DOCUMENTED IN MEDICAL RECORD: ICD-10-PCS | Mod: CPTII,95,, | Performed by: PSYCHOLOGIST

## 2023-04-04 PROCEDURE — 90833 PSYTX W PT W E/M 30 MIN: CPT | Mod: 95,,, | Performed by: PSYCHOLOGIST

## 2023-04-04 PROCEDURE — 90833 PR PSYCHOTHERAPY W/PATIENT W/E&M, 30 MIN (ADD ON): ICD-10-PCS | Mod: 95,,, | Performed by: PSYCHOLOGIST

## 2023-04-04 PROCEDURE — 1159F MED LIST DOCD IN RCRD: CPT | Mod: CPTII,95,, | Performed by: PSYCHOLOGIST

## 2023-04-04 PROCEDURE — 99213 OFFICE O/P EST LOW 20 MIN: CPT | Mod: 95,,, | Performed by: PSYCHOLOGIST

## 2023-04-04 PROCEDURE — 99213 PR OFFICE/OUTPT VISIT, EST, LEVL III, 20-29 MIN: ICD-10-PCS | Mod: 95,,, | Performed by: PSYCHOLOGIST

## 2023-04-04 RX ORDER — BUSPIRONE HYDROCHLORIDE 10 MG/1
10 TABLET ORAL 2 TIMES DAILY
Qty: 180 TABLET | Refills: 1 | Status: SHIPPED | OUTPATIENT
Start: 2023-04-04 | End: 2023-10-31 | Stop reason: SDUPTHER

## 2023-04-04 NOTE — PROGRESS NOTES
The patient location is: [place], [place]  The patient location [place] is: [place]  The patient phone number is: [phone]  Visit type: Virtual visit with audio  Each patient to whom he or she provides medical services by telemedicine is:  (1) informed of the relationship between the physician and patient and the respective role of any other health care provider with respect to management of the patient; and (2) notified that he or she may decline to receive medical services by telemedicine and may withdraw from such care at any time.    The reason for the audio only service rather than synchronous audio and video virtual visit was related to technical difficulties or patient preference/necessity.      Outpatient Psychiatry Follow-Up Visit    Clinical Status of Patient: Outpatient (Ambulatory)  04/03/2023     Chief Complaint: depression, anxiety     Interval History and Content of Current Session:  Interim Events/Subjective Report/Content of Current Session:  follow-up appointment with new provider as previous provider has relocated.    Pt is a 40 y/o female with past psychiatric hx of depression, anxiety, and ADHD who presents for follow-up treatment. Pt reported that work related stress has decreased. Noted that a few negative personalities have quit. Pt also noted that she got a small raise based on a market analysis. Pt stated that her mood has improved since our last visit. Describes it as stable. 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 Concerned about concentration and considering restarting psychostimulants. Was D/C'd by previous psychiatrist due to unmanaged BP. Continues to have stressors with ex- but appears to be manageable.     Past Psychiatric hx:  zoloft 100mg (ineffective), effexor 75mg po qam (through PCP, can't recall effect), trazodone (nightmares), wellbutrin xl 150mg (h/a's and tmj), klonopin, ambien (ineffective), strattera 60mg po daily, lamotrigine,  fluoxetine (smells increased), Lexapro 20 mg    Past Medical hx:   Past Medical History:   Diagnosis Date    Allergy     Anxiety     Colon polyps     CPAP (continuous positive airway pressure) dependence     Depression     Diverticulitis     GERD (gastroesophageal reflux disease)     Hiatal hernia with GERD without esophagitis 07/14/2016    EGD per Dr. Narciso Siddiqi    Hypertension     Polycystic ovarian syndrome     PONV (postoperative nausea and vomiting)     Rectal bleeding     Sleep apnea         Interim hx:  Medication changes last visit: None  Anxiety: stable  Depression: stable     Denies suicidal/homicidal ideations.  Denies hopelessness/worthlessness.    Denies auditory/visual hallucinations      SubstHx:   Tobacco - Pt denies  Etoh - quit 11/2015 due to migraines; h/o binge drinking in college  Drugs - Pt denies   Caffeine- minimal- sometimes tea      Review of Systems   PSYCHIATRIC: Pertinent items are noted in the narrative.        CONSTITUTIONAL: weight stable    Past Medical, Family and Social History: The patient's past medical, family and social history have been reviewed and updated as appropriate within the electronic medical record. See encounter notes.     Current Psychiatric Medication:  Buspar 10 mg BID, duloxetine 60 mg.     Compliance: yes      Side effects: Pt denied     Risk Parameters:  Patient reports no suicidal ideation  Patient reports no homicidal ideation  Patient reports no self-injurious behavior  Patient reports no violent behavior     Exam (detailed: at least 9 elements; comprehensive: all 15 elements)   Constitutional  Vitals:  Most recent vital signs, dated less than 90 days prior to this appointment, were reviewed. BP: ()/()   Arterial Line BP: ()/()       General:  unremarkable, age appropriate, casual attire, good eye contact, good rapport       Musculoskeletal  Muscle Strength/Tone:  no flaccidity, no tremor    Gait & Station:  normal      Psychiatric                        Speech:  normal tone, normal rate, rhythm, and volume   Mood & Affect:   stable         Thought Process:   Goal directed; Linear    Associations:   intact   Thought Content:   No SI/HI, delusions, or paranoia, no AV/VH   Insight & Judgement:   Good, adequate to circumstances   Orientation:   grossly intact; alert and oriented x 4    Memory:  intact for content of interview    Language:  grossly intact, can repeat    Attention Span  : Grossly intact for content of interview   Fund of Knowledge:   intact and appropriate to age and level of education        Assessment and Diagnosis   Status/Progress: Based on the examination today, the patient's problem(s) is/are adequately controlled. New problems have not been presented today. Co-morbidities are not complicating management of the primary condition. There are active rule-out diagnoses for this patient at this time.      Impression: Pt's mood and anxiety appear to have improved. 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 Possibly interested in restarting psychostimulants in the future but not at this time. Will continue with medication plan as is and monitor.     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 Cont buspar 10 mg po bid    2. Cont duloxetine to 60 mg     3. Call to report any worsening of symptoms or problems with the medication. Pt instructed to go to ER with thoughts of harming self, others     4. Patient given contact # for psychotherapists at [facility] and also instructed to check with insurance for list of providers.     Psychotherapy: 20 minutes  Target symptoms: depression  Why chosen therapy is appropriate versus another modality: CBT used; relevant to diagnosis, patient responds  to this modality  Outcome monitoring methods: self-report, observation  Therapeutic intervention type: Cognitive Behavioral Therapy  Topics discussed/themes: building skills sets for symptom management, symptom recognition, nutrition, exercise  The patient's response to the intervention is accepting  Patient's response to treatment is: good.   The patient's progress toward treatment goals: limited      Return to clinic: 3 months    -Cognitive-Behavioral/Supportive therapy and psychoeducation provided  -R/B/SE's of medications discussed with the pt who expresses understanding and chooses to take medications as prescribed.   -Pt instructed to call clinic, 911 or go to nearest emergency room if sxs worsen or pt is in   crisis. The pt expresses understanding.    Carroll Trent, PhD, MP     Antidepressant/Antianxiety Medication Initiation:  Patient informed of risks, benefits, and potential side effects of medication and accepts informed consent.  Common side effects include nausea, fatigue, headache, insomnia., Specifically discussed the possibility of new or worsening suicidal thoughts/depression.  Patient instructed to stop the medication immediately and seek urgent treatment if this occurs. Patient instructed not to abruptly discontinue medication without physician guidance except in cases of sudden onset or worsening of SI.       Pregnancy Warning:  Patient denies current pregnancy possibility.  Patient made aware that medications have not been proven safe in pregnancy and that she must maintain adequate birth control.  Patient instructed to alert us immediately if she becomes pregnant.

## 2023-05-03 ENCOUNTER — OFFICE VISIT (OUTPATIENT)
Dept: OBSTETRICS AND GYNECOLOGY | Facility: CLINIC | Age: 43
End: 2023-05-03
Payer: COMMERCIAL

## 2023-05-03 VITALS — SYSTOLIC BLOOD PRESSURE: 136 MMHG | BODY MASS INDEX: 52.28 KG/M2 | DIASTOLIC BLOOD PRESSURE: 80 MMHG | WEIGHT: 293 LBS

## 2023-05-03 DIAGNOSIS — L68.0 HIRSUTISM: Primary | ICD-10-CM

## 2023-05-03 PROCEDURE — 3075F PR MOST RECENT SYSTOLIC BLOOD PRESS GE 130-139MM HG: ICD-10-PCS | Mod: CPTII,S$GLB,, | Performed by: OBSTETRICS & GYNECOLOGY

## 2023-05-03 PROCEDURE — 3079F PR MOST RECENT DIASTOLIC BLOOD PRESSURE 80-89 MM HG: ICD-10-PCS | Mod: CPTII,S$GLB,, | Performed by: OBSTETRICS & GYNECOLOGY

## 2023-05-03 PROCEDURE — 99999 PR PBB SHADOW E&M-EST. PATIENT-LVL III: CPT | Mod: PBBFAC,,, | Performed by: OBSTETRICS & GYNECOLOGY

## 2023-05-03 PROCEDURE — 3044F HG A1C LEVEL LT 7.0%: CPT | Mod: CPTII,S$GLB,, | Performed by: OBSTETRICS & GYNECOLOGY

## 2023-05-03 PROCEDURE — 1159F MED LIST DOCD IN RCRD: CPT | Mod: CPTII,S$GLB,, | Performed by: OBSTETRICS & GYNECOLOGY

## 2023-05-03 PROCEDURE — 1159F PR MEDICATION LIST DOCUMENTED IN MEDICAL RECORD: ICD-10-PCS | Mod: CPTII,S$GLB,, | Performed by: OBSTETRICS & GYNECOLOGY

## 2023-05-03 PROCEDURE — 3008F BODY MASS INDEX DOCD: CPT | Mod: CPTII,S$GLB,, | Performed by: OBSTETRICS & GYNECOLOGY

## 2023-05-03 PROCEDURE — 3008F PR BODY MASS INDEX (BMI) DOCUMENTED: ICD-10-PCS | Mod: CPTII,S$GLB,, | Performed by: OBSTETRICS & GYNECOLOGY

## 2023-05-03 PROCEDURE — 3075F SYST BP GE 130 - 139MM HG: CPT | Mod: CPTII,S$GLB,, | Performed by: OBSTETRICS & GYNECOLOGY

## 2023-05-03 PROCEDURE — 3079F DIAST BP 80-89 MM HG: CPT | Mod: CPTII,S$GLB,, | Performed by: OBSTETRICS & GYNECOLOGY

## 2023-05-03 PROCEDURE — 99213 OFFICE O/P EST LOW 20 MIN: CPT | Mod: S$GLB,,, | Performed by: OBSTETRICS & GYNECOLOGY

## 2023-05-03 PROCEDURE — 3044F PR MOST RECENT HEMOGLOBIN A1C LEVEL <7.0%: ICD-10-PCS | Mod: CPTII,S$GLB,, | Performed by: OBSTETRICS & GYNECOLOGY

## 2023-05-03 PROCEDURE — 99999 PR PBB SHADOW E&M-EST. PATIENT-LVL III: ICD-10-PCS | Mod: PBBFAC,,, | Performed by: OBSTETRICS & GYNECOLOGY

## 2023-05-03 PROCEDURE — 99213 PR OFFICE/OUTPT VISIT, EST, LEVL III, 20-29 MIN: ICD-10-PCS | Mod: S$GLB,,, | Performed by: OBSTETRICS & GYNECOLOGY

## 2023-05-03 RX ORDER — METFORMIN HYDROCHLORIDE 500 MG/1
500 TABLET ORAL 2 TIMES DAILY
COMMUNITY
Start: 2023-04-28 | End: 2023-05-26

## 2023-05-03 NOTE — PROGRESS NOTES
Chief Complaint   Patient presents with    Establish Care     New Patient. Patient would like to discuss PCOS concerns, was dx in 2010 by a prev ob/gyn; symptoms of weight gain/cannot lose weight, ovarian cyst, irregular cycles, skin tags, facial hair    Hormones       History of Present Illness   42 y.o. F patient presents today for PCOS work up    Difficulty with weight loss  Interested in weight loss injection  She is seeing NP  She has history of PCOS  Her GYN, who diagnosed PCOS, has passed away 9yr ago, Dr. Henrique Bain  She needs proof of PCOS to get weight loss shots  Diagnosed by ultrasound results & labs were normal  Admits to facial hair and shaving    AUB:   Menarche: 13  Cycles monthly, irregular, denies skipping cycles, lasting 5-7 days, heavy to light, using about 10 tampons per day & pad, and no pain   Dysmenorrhea: no  Current Contraception: none  Prior Contraception: Implant removed due to hormonal fluctuation, Pill no issues, Nuvaring no issues  Sexually Active: yes  Patient's last menstrual period was 04/25/2023 (exact date).     MedHx: chronic hypertension, PreDM, TSH 4.49 last week    Risk Factors for Metabolic Syndrome:   *Waist circumference: Body mass index is 52.28 kg/m².  *Triglycerides >150  *HDL <50  *Systolic BP >130  *HgA1C 5.9    Triglycerides   Date Value Ref Range Status   04/24/2023 163 (H) 30 - 150 mg/dL Final     Comment:     The National Cholesterol Education Program (NCEP) has set the  following guidelines (reference values) for triglycerides:  Normal......................<150 mg/dL  Borderline High.............150-199 mg/dL  High........................200-499 mg/dL     07/15/2022 177 (H) 30 - 150 mg/dL Final     Comment:     The National Cholesterol Education Program (NCEP) has set the  following guidelines (reference values) for triglycerides:  Normal......................<150 mg/dL  Borderline High.............150-199 mg/dL  High........................200-499 mg/dL        HDL   Date Value Ref Range Status   04/24/2023 42 40 - 75 mg/dL Final     Comment:     The National Cholesterol Education Program (NCEP) has set the  following guidelines (reference values) for HDL Cholesterol:  Low...............<40 mg/dL  Optimal...........>60 mg/dL     07/15/2022 42 40 - 75 mg/dL Final     Comment:     The National Cholesterol Education Program (NCEP) has set the  following guidelines (reference values) for HDL Cholesterol:  Low...............<40 mg/dL  Optimal...........>60 mg/dL       Hemoglobin A1c referral test   Date Value Ref Range Status   04/24/2023 5.9 (H) <=5.6 % Final     Comment:     INTERPRETIVE INFORMATION: Hemoglobin A1c  HbA1c values of 5.7-6.4 percent indicate an increased risk   for developing diabetes mellitus. HbA1c values greater than   or equal to 6.5 percent are diagnostic of diabetes   mellitus. For diagnosis of diabetes in individuals without   unequivocal hyperglycemia, results should be confirmed by   repeat testing.       Vitals:    05/03/23 1452   BP: 136/80   Weight: (!) 144.7 kg (319 lb 0.1 oz)       Past medical and surgical history reviewed.   I have reviewed the patient's medical history in detail and updated the computerized patient record.  Review of patient's allergies indicates:   Allergen Reactions    Pcn [penicillins] Itching    Penicillin g potassium      Review of Systems  Constitutional: negative for chills and fatigue  Gastrointestinal: negative for abdominal pain, constipation, diarrhea, nausea and vomiting  Genitourinary:negative for abnormal menstrual periods, genital lesions and vaginal discharge, dysuria, frequency and hematuria    Physical Examination:  /80   Wt (!) 144.7 kg (319 lb 0.1 oz)   LMP 04/25/2023 (Exact Date) Comment: Menstrual cycles, lasts 5-7 days  BMI 52.28 kg/m²    Physical Exam  Deferred    Assessment/Plan:  Hirsutism  -     17-Hydroxyprogesterone; Future; Expected date: 05/03/2023  -     DHEA-Sulfate; Future;  Expected date: 05/03/2023  -     Follicle Stimulating Hormone; Future; Expected date: 05/03/2023  -     Luteinizing Hormone; Future; Expected date: 05/03/2023  -     US Transvaginal Non OB; Future; Expected date: 05/03/2023  -     TSH; Future; Expected date: 05/03/2023  -     Testosterone, Free; Future; Expected date: 05/03/2023  -     Estradiol; Future; Expected date: 05/03/2023  -     Prolactin; Future; Expected date: 05/03/2023  -     HCG, Quantitative; Future; Expected date: 05/03/2023  -     GLUCOSE, FASTING; Future; Expected date: 05/05/2023

## 2023-07-20 ENCOUNTER — TELEPHONE (OUTPATIENT)
Dept: PSYCHIATRY | Facility: CLINIC | Age: 43
End: 2023-07-20
Payer: COMMERCIAL

## 2023-07-20 NOTE — TELEPHONE ENCOUNTER
KRISTINA in attempt to confirm pt virtual appointment for 7/24/23 at 5 pm with Dr Trent. LM for pt to call clinic back whenever she gets a chance.

## 2023-07-23 NOTE — PROGRESS NOTES
The patient location is: [place], [place]  The patient location [place] is: [place]  The patient phone number is: [phone]  Visit type: Virtual visit with audio  Each patient to whom he or she provides medical services by telemedicine is:  (1) informed of the relationship between the physician and patient and the respective role of any other health care provider with respect to management of the patient; and (2) notified that he or she may decline to receive medical services by telemedicine and may withdraw from such care at any time.    The reason for the audio only service rather than synchronous audio and video virtual visit was related to technical difficulties or patient preference/necessity.      Outpatient Psychiatry Follow-Up Visit    Clinical Status of Patient: Outpatient (Ambulatory)  07/24/2023     Chief Complaint: depression, anxiety     Interval History and Content of Current Session:  Interim Events/Subjective Report/Content of Current Session:  follow-up appointment with new provider as previous provider has relocated.    Pt is a 40 y/o female with past psychiatric hx of depression, anxiety, and ADHD who presents for follow-up treatment. Pt reported that her mood and anxiety are more stable. Sleep has improved. Pt noted that she got a new job as a  with the [employer]. 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 Pt discussed hopefulness for her financial future and feeling like she will be able to retire some day.     Past Psychiatric hx:  zoloft 100mg (ineffective), effexor 75mg po qam (through PCP, can't recall effect), trazodone (nightmares), wellbutrin xl 150mg (h/a's and tmj), klonopin, ambien (ineffective), strattera 60mg po daily, lamotrigine, fluoxetine (smells increased), Lexapro 20 mg    Past Medical hx:   Past Medical History:   Diagnosis Date    Allergy     Anxiety     Colon polyps     CPAP (continuous positive airway  pressure) dependence     Depression     Diverticulitis     GERD (gastroesophageal reflux disease)     Hiatal hernia with GERD without esophagitis 07/14/2016    EGD per Dr. Narciso Siddiqi    Hypertension     Polycystic ovarian syndrome     PONV (postoperative nausea and vomiting)     Rectal bleeding     Sleep apnea         Interim hx:  Medication changes last visit: None  Anxiety: stable  Depression: stable     Denies suicidal/homicidal ideations.  Denies hopelessness/worthlessness.    Denies auditory/visual hallucinations      SubstHx:   Tobacco - Pt denies  Etoh - quit 11/2015 due to migraines; h/o binge drinking in college  Drugs - Pt denies   Caffeine- minimal- sometimes tea      Review of Systems   PSYCHIATRIC: Pertinent items are noted in the narrative.        CONSTITUTIONAL: weight stable    Past Medical, Family and Social History: The patient's past medical, family and social history have been reviewed and updated as appropriate within the electronic medical record. See encounter notes.     Current Psychiatric Medication:  Buspar 10 mg BID, duloxetine 60 mg.     Compliance: yes      Side effects: Pt denied     Risk Parameters:  Patient reports no suicidal ideation  Patient reports no homicidal ideation  Patient reports no self-injurious behavior  Patient reports no violent behavior     Exam (detailed: at least 9 elements; comprehensive: all 15 elements)   Constitutional  Vitals:  Most recent vital signs, dated less than 90 days prior to this appointment, were reviewed. BP: ()/()   Arterial Line BP: ()/()       General:  unremarkable, age appropriate, casual attire, good eye contact, good rapport       Musculoskeletal  Muscle Strength/Tone:  no flaccidity, no tremor    Gait & Station:  normal      Psychiatric                       Speech:  normal tone, normal rate, rhythm, and volume   Mood & Affect:   stable         Thought Process:   Goal directed; Linear    Associations:   intact   Thought Content:   No  SI/HI, delusions, or paranoia, no AV/VH   Insight & Judgement:   Good, adequate to circumstances   Orientation:   grossly intact; alert and oriented x 4    Memory:  intact for content of interview    Language:  grossly intact, can repeat    Attention Span  : Grossly intact for content of interview   Fund of Knowledge:   intact and appropriate to age and level of education        Assessment and Diagnosis   Status/Progress: Based on the examination today, the patient's problem(s) is/are adequately controlled. New problems have not been presented today. Co-morbidities are not complicating management of the primary condition. There are active rule-out diagnoses for this patient at this time.      Impression: Pt's mood and anxiety appear to be stable. Work schedule is more consistent, which has helped regulate circadian rhythms. Pt appears to be no longer interested in restarting psychostimulants given this new career opportunity. Will continue with medication plan as is and monitor moving forward.     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 Cont buspar 10 mg po bid    2. Cont duloxetine to 60 mg     3. Call to report any worsening of symptoms or problems with the medication. Pt instructed to go to ER with thoughts of harming self, others     4. Patient given contact # for psychotherapists at [facility] and also instructed to check with insurance for list of providers.     Psychotherapy: None  Target symptoms:   Why chosen therapy is appropriate versus another modality: CBT used; relevant to diagnosis, patient responds to this modality  Outcome monitoring methods: self-report, observation  Therapeutic intervention type: Cognitive Behavioral Therapy  Topics discussed/themes: building  skills sets for symptom management, symptom recognition, nutrition, exercise  The patient's response to the intervention is accepting  Patient's response to treatment is: good.   The patient's progress toward treatment goals: limited      Return to clinic: 6 months    -Cognitive-Behavioral/Supportive therapy and psychoeducation provided  -R/B/SE's of medications discussed with the pt who expresses understanding and chooses to take medications as prescribed.   -Pt instructed to call clinic, 911 or go to nearest emergency room if sxs worsen or pt is in   crisis. The pt expresses understanding.    Carroll Trent, PhD, MP     Antidepressant/Antianxiety Medication Initiation:  Patient informed of risks, benefits, and potential side effects of medication and accepts informed consent.  Common side effects include nausea, fatigue, headache, insomnia., Specifically discussed the possibility of new or worsening suicidal thoughts/depression.  Patient instructed to stop the medication immediately and seek urgent treatment if this occurs. Patient instructed not to abruptly discontinue medication without physician guidance except in cases of sudden onset or worsening of SI.       Pregnancy Warning:  Patient denies current pregnancy possibility.  Patient made aware that medications have not been proven safe in pregnancy and that she must maintain adequate birth control.  Patient instructed to alert us immediately if she becomes pregnant.

## 2023-07-24 ENCOUNTER — OFFICE VISIT (OUTPATIENT)
Dept: PSYCHIATRY | Facility: CLINIC | Age: 43
End: 2023-07-24
Payer: COMMERCIAL

## 2023-07-24 DIAGNOSIS — F39 MOOD DISORDER: Primary | ICD-10-CM

## 2023-07-24 DIAGNOSIS — F90.9 ATTENTION DEFICIT HYPERACTIVITY DISORDER (ADHD), UNSPECIFIED ADHD TYPE: ICD-10-CM

## 2023-07-24 DIAGNOSIS — F41.1 GAD (GENERALIZED ANXIETY DISORDER): ICD-10-CM

## 2023-07-24 PROCEDURE — 99214 OFFICE O/P EST MOD 30 MIN: CPT | Mod: 95,,, | Performed by: PSYCHOLOGIST

## 2023-07-24 PROCEDURE — 3044F PR MOST RECENT HEMOGLOBIN A1C LEVEL <7.0%: ICD-10-PCS | Mod: CPTII,95,, | Performed by: PSYCHOLOGIST

## 2023-07-24 PROCEDURE — 1159F PR MEDICATION LIST DOCUMENTED IN MEDICAL RECORD: ICD-10-PCS | Mod: CPTII,95,, | Performed by: PSYCHOLOGIST

## 2023-07-24 PROCEDURE — 99214 PR OFFICE/OUTPT VISIT, EST, LEVL IV, 30-39 MIN: ICD-10-PCS | Mod: 95,,, | Performed by: PSYCHOLOGIST

## 2023-07-24 PROCEDURE — 3044F HG A1C LEVEL LT 7.0%: CPT | Mod: CPTII,95,, | Performed by: PSYCHOLOGIST

## 2023-07-24 PROCEDURE — 1159F MED LIST DOCD IN RCRD: CPT | Mod: CPTII,95,, | Performed by: PSYCHOLOGIST

## 2023-08-03 DIAGNOSIS — F41.1 GAD (GENERALIZED ANXIETY DISORDER): ICD-10-CM

## 2023-08-03 DIAGNOSIS — F39 MOOD DISORDER: ICD-10-CM

## 2023-08-04 RX ORDER — DULOXETIN HYDROCHLORIDE 60 MG/1
60 CAPSULE, DELAYED RELEASE ORAL DAILY
Qty: 90 CAPSULE | Refills: 0 | Status: SHIPPED | OUTPATIENT
Start: 2023-08-04 | End: 2023-10-31 | Stop reason: SDUPTHER

## 2023-10-27 ENCOUNTER — PATIENT MESSAGE (OUTPATIENT)
Dept: PSYCHIATRY | Facility: CLINIC | Age: 43
End: 2023-10-27
Payer: COMMERCIAL

## 2023-10-31 ENCOUNTER — OFFICE VISIT (OUTPATIENT)
Dept: PSYCHIATRY | Facility: CLINIC | Age: 43
End: 2023-10-31
Payer: COMMERCIAL

## 2023-10-31 DIAGNOSIS — F90.0 ATTENTION DEFICIT HYPERACTIVITY DISORDER, INATTENTIVE TYPE: Primary | ICD-10-CM

## 2023-10-31 DIAGNOSIS — F39 MOOD DISORDER: ICD-10-CM

## 2023-10-31 DIAGNOSIS — F41.1 GAD (GENERALIZED ANXIETY DISORDER): ICD-10-CM

## 2023-10-31 PROCEDURE — 3044F HG A1C LEVEL LT 7.0%: CPT | Mod: CPTII,95,, | Performed by: PSYCHOLOGIST

## 2023-10-31 PROCEDURE — 3044F PR MOST RECENT HEMOGLOBIN A1C LEVEL <7.0%: ICD-10-PCS | Mod: CPTII,95,, | Performed by: PSYCHOLOGIST

## 2023-10-31 PROCEDURE — 1159F PR MEDICATION LIST DOCUMENTED IN MEDICAL RECORD: ICD-10-PCS | Mod: CPTII,95,, | Performed by: PSYCHOLOGIST

## 2023-10-31 PROCEDURE — 1159F MED LIST DOCD IN RCRD: CPT | Mod: CPTII,95,, | Performed by: PSYCHOLOGIST

## 2023-10-31 PROCEDURE — 99214 OFFICE O/P EST MOD 30 MIN: CPT | Mod: 95,,, | Performed by: PSYCHOLOGIST

## 2023-10-31 PROCEDURE — 99214 PR OFFICE/OUTPT VISIT, EST, LEVL IV, 30-39 MIN: ICD-10-PCS | Mod: 95,,, | Performed by: PSYCHOLOGIST

## 2023-10-31 RX ORDER — BUSPIRONE HYDROCHLORIDE 10 MG/1
10 TABLET ORAL 2 TIMES DAILY
Qty: 180 TABLET | Refills: 1 | Status: SHIPPED | OUTPATIENT
Start: 2023-10-31 | End: 2024-01-31 | Stop reason: SDUPTHER

## 2023-10-31 RX ORDER — DULOXETIN HYDROCHLORIDE 60 MG/1
60 CAPSULE, DELAYED RELEASE ORAL DAILY
Qty: 90 CAPSULE | Refills: 0 | Status: SHIPPED | OUTPATIENT
Start: 2023-10-31 | End: 2023-11-27 | Stop reason: SDUPTHER

## 2023-10-31 RX ORDER — LISDEXAMFETAMINE DIMESYLATE 30 MG/1
30 CAPSULE ORAL EVERY MORNING
Qty: 30 CAPSULE | Refills: 0 | Status: SHIPPED | OUTPATIENT
Start: 2023-10-31 | End: 2023-12-04

## 2023-10-31 NOTE — PROGRESS NOTES
The patient location is: [place], [place]  The patient location [place] is: [place]  The patient phone number is: [phone]  Visit type: Virtual visit with synchronous audio and video  Each patient to whom he or she provides medical services by telemedicine is:  (1) informed of the relationship between the physician and patient and the respective role of any other health care provider with respect to management of the patient; and (2) notified that he or she may decline to receive medical services by telemedicine and may withdraw from such care at any time.     Outpatient Psychiatry Follow-Up Visit    Clinical Status of Patient: Outpatient (Ambulatory)  10/31/2023     Chief Complaint: depression, anxiety     Interval History and Content of Current Session:  Interim Events/Subjective Report/Content of Current Session:  follow-up appointment with new provider as previous provider has relocated.    Pt is a 40 y/o female with past psychiatric hx of depression, anxiety, and ADHD who presents for follow-up treatment. Pt reported that she is having difficulty maintaining attention at new job. 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 Pt noted that she is receiving negative reviews due to not completing them as quickly as expected. Pt noted that her mood is stable; however, this current stressor has increased her anxiety. Pt reported a past trial of Strattera, which was ineffective for ADHD. Positive past trial with Vyvanse. Pt did recently start semiglutide and has lost 20 pounds.     Past Psychiatric hx:  zoloft 100mg (ineffective), effexor 75mg po qam (through PCP, can't recall effect), trazodone (nightmares), wellbutrin xl 150mg (h/a's and tmj), klonopin, ambien (ineffective), strattera 60mg po daily, lamotrigine, fluoxetine (smells increased), Lexapro 20 mg, Vyvanse (insurance was not covering it)    Past Medical hx:   Past Medical  History:   Diagnosis Date    Allergy     Anxiety     Colon polyps     CPAP (continuous positive airway pressure) dependence     Depression     Diverticulitis     GERD (gastroesophageal reflux disease)     Hiatal hernia with GERD without esophagitis 07/14/2016    EGD per Dr. Narciso Siddiqi    Hypertension     Polycystic ovarian syndrome     PONV (postoperative nausea and vomiting)     Rectal bleeding     Sleep apnea         Interim hx:  Medication changes last visit: None  Anxiety: stable  Depression: stable     Denies suicidal/homicidal ideations.  Denies hopelessness/worthlessness.    Denies auditory/visual hallucinations      SubstHx:   Tobacco - Pt denies  Etoh - quit 11/2015 due to migraines; h/o binge drinking in college  Drugs - Pt denies   Caffeine- minimal- sometimes tea      Review of Systems   PSYCHIATRIC: Pertinent items are noted in the narrative.        CONSTITUTIONAL: weight stable    Past Medical, Family and Social History: The patient's past medical, family and social history have been reviewed and updated as appropriate within the electronic medical record. See encounter notes.     Current Psychiatric Medication:  Buspar 10 mg BID, duloxetine 60 mg.     Compliance: yes      Side effects: Pt denied     Risk Parameters:  Patient reports no suicidal ideation  Patient reports no homicidal ideation  Patient reports no self-injurious behavior  Patient reports no violent behavior     Exam (detailed: at least 9 elements; comprehensive: all 15 elements)   Constitutional  Vitals:  Most recent vital signs, dated less than 90 days prior to this appointment, were reviewed. BP: ()/()   Arterial Line BP: ()/()       General:  unremarkable, age appropriate, casual attire, good eye contact, good rapport       Musculoskeletal  Muscle Strength/Tone:  no flaccidity, no tremor    Gait & Station:  normal      Psychiatric                       Speech:  normal tone, normal rate, rhythm, and volume   Mood & Affect:   stable          Thought Process:   Goal directed; Linear    Associations:   intact   Thought Content:   No SI/HI, delusions, or paranoia, no AV/VH   Insight & Judgement:   Good, adequate to circumstances   Orientation:   grossly intact; alert and oriented x 4    Memory:  intact for content of interview    Language:  grossly intact, can repeat    Attention Span  : Grossly intact for content of interview   Fund of Knowledge:   intact and appropriate to age and level of education        Assessment and Diagnosis   Status/Progress: Based on the examination today, the patient's problem(s) is/are adequately controlled. New problems have not been presented today. Co-morbidities are not complicating management of the primary condition. There are active rule-out diagnoses for this patient at this time.      Impression: Pt's mood appears to be stable with increased anxiety associated with work stressor. Pt does have a positive ADHD evaluation history and it appears that the cognitive demands at her new job are exceeding her coping resources. Will start a trial of Vyvanse given a positive history of such and evaluate moving forward.     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 Cont buspar 10 mg po bid    2. Cont duloxetine to 60 mg    3. Start trial of Vyvanse 30 mg     3. Call to report any worsening of symptoms or problems with the medication. Pt instructed to go to ER with thoughts of harming self, others     4. Patient given contact # for psychotherapists at [facility] and also instructed to check with insurance for list of providers.     Psychotherapy: None  Target symptoms:   Why chosen therapy is appropriate versus another modality: CBT used; relevant to diagnosis, patient  responds to this modality  Outcome monitoring methods: self-report, observation  Therapeutic intervention type: Cognitive Behavioral Therapy  Topics discussed/themes: building skills sets for symptom management, symptom recognition, nutrition, exercise  The patient's response to the intervention is accepting  Patient's response to treatment is: good.   The patient's progress toward treatment goals: limited      Return to clinic: 1 month    -Cognitive-Behavioral/Supportive therapy and psychoeducation provided  -R/B/SE's of medications discussed with the pt who expresses understanding and chooses to take medications as prescribed.   -Pt instructed to call clinic, 911 or go to nearest emergency room if sxs worsen or pt is in   crisis. The pt expresses understanding.    Carroll Trent, PhD, MP     Antidepressant/Antianxiety Medication Initiation:  Patient informed of risks, benefits, and potential side effects of medication and accepts informed consent.  Common side effects include nausea, fatigue, headache, insomnia., Specifically discussed the possibility of new or worsening suicidal thoughts/depression.  Patient instructed to stop the medication immediately and seek urgent treatment if this occurs. Patient instructed not to abruptly discontinue medication without physician guidance except in cases of sudden onset or worsening of SI.       Pregnancy Warning:  Patient denies current pregnancy possibility.  Patient made aware that medications have not been proven safe in pregnancy and that she must maintain adequate birth control.  Patient instructed to alert us immediately if she becomes pregnant.      Stimulant Medication Initiation:  Patient advised of risks, benefits, and side effects of medication and accepts informed consent.  Common side effects include insomnia, irritability, jittery feeling, dry mouth, and agitation/hostility., Patient advised of potential addictive nature of medication and controlled substance  classification.  Instructed to safeguard medication as no early refills can be given for lost or stolen medications.

## 2023-11-01 ENCOUNTER — TELEPHONE (OUTPATIENT)
Dept: PSYCHIATRY | Facility: CLINIC | Age: 43
End: 2023-11-01
Payer: COMMERCIAL

## 2023-11-01 NOTE — TELEPHONE ENCOUNTER
MRN:  [id number] - NB - please contact to schedule a f/u visit in the afternoon of either 11/29 or 11/30. Thank you.

## 2023-11-27 DIAGNOSIS — F41.1 GAD (GENERALIZED ANXIETY DISORDER): ICD-10-CM

## 2023-11-27 DIAGNOSIS — F39 MOOD DISORDER: ICD-10-CM

## 2023-11-27 RX ORDER — DULOXETIN HYDROCHLORIDE 60 MG/1
60 CAPSULE, DELAYED RELEASE ORAL DAILY
Qty: 90 CAPSULE | Refills: 0 | Status: SHIPPED | OUTPATIENT
Start: 2023-11-27 | End: 2024-01-31 | Stop reason: SDUPTHER

## 2023-12-04 PROBLEM — H69.93 EUSTACHIAN TUBE DISORDER, BILATERAL: Status: ACTIVE | Noted: 2023-12-04

## 2024-01-31 DIAGNOSIS — F41.1 GAD (GENERALIZED ANXIETY DISORDER): ICD-10-CM

## 2024-01-31 DIAGNOSIS — F39 MOOD DISORDER: ICD-10-CM

## 2024-01-31 RX ORDER — BUSPIRONE HYDROCHLORIDE 10 MG/1
10 TABLET ORAL 2 TIMES DAILY
Qty: 60 TABLET | Refills: 0 | Status: SHIPPED | OUTPATIENT
Start: 2024-01-31 | End: 2024-02-06 | Stop reason: SDUPTHER

## 2024-01-31 RX ORDER — DULOXETIN HYDROCHLORIDE 60 MG/1
60 CAPSULE, DELAYED RELEASE ORAL DAILY
Qty: 30 CAPSULE | Refills: 0 | Status: SHIPPED | OUTPATIENT
Start: 2024-01-31 | End: 2024-02-06 | Stop reason: SDUPTHER

## 2024-01-31 NOTE — TELEPHONE ENCOUNTER
Buspar  LDO-10/31  LOV- 10/31  NOV- NONE    CYMBALTA  LDO-11/27  LOV-10/31  NOV- NONE    I CALLED PT TO SCHED F/U NO ANSWER. LVM AND SENT MSG

## 2024-02-05 NOTE — PROGRESS NOTES
The patient location is: [place], [place]  The patient location [place] is: [place]  The patient phone number is: [phone]  Visit type: Virtual visit with synchronous audio and video  Each patient to whom he or she provides medical services by telemedicine is:  (1) informed of the relationship between the physician and patient and the respective role of any other health care provider with respect to management of the patient; and (2) notified that he or she may decline to receive medical services by telemedicine and may withdraw from such care at any time.     Outpatient Psychiatry Follow-Up Visit    Clinical Status of Patient: Outpatient (Ambulatory)  02/06/2024     Chief Complaint: depression, anxiety     Interval History and Content of Current Session:  Interim Events/Subjective Report/Content of Current Session:  follow-up appointment with new provider as previous provider has relocated.    Pt is a 40 y/o female with past psychiatric hx of depression, anxiety, and ADHD who presents for follow-up treatment. Pt stated that she was getting signs from her job that she was not performing/learning material well. Transitioned to a different position and training is much easier. Stress is starting to decrease. Stated that she feels lonely at times. Is trying to spend time around others. Pt stopped taking the Vyvanse as her BP increased.     Past Psychiatric hx:  zoloft 100mg (ineffective), effexor 75mg po qam (through PCP, can't recall effect), trazodone (nightmares), wellbutrin xl 150mg (h/a's and tmj), klonopin, ambien (ineffective), strattera 60mg po daily, lamotrigine, fluoxetine (smells increased), Lexapro 20 mg, Vyvanse (insurance was not covering it)    Past Medical hx:   Past Medical History:   Diagnosis Date    Allergy     Anxiety     Colon polyps     CPAP (continuous positive airway pressure) dependence     Depression     Diverticulitis     GERD (gastroesophageal reflux disease)     Hiatal hernia with  GERD without esophagitis 07/14/2016    EGD per Dr. Narciso Siddiqi    Hypertension     Polycystic ovarian syndrome     PONV (postoperative nausea and vomiting)     Rectal bleeding     Sleep apnea         Interim hx:  Medication changes last visit: Start trial of Vyvanse 30 mg  Anxiety: stable  Depression: stable     Denies suicidal/homicidal ideations.  Denies hopelessness/worthlessness.    Denies auditory/visual hallucinations      SubstHx:   Tobacco - Pt denies  Etoh - quit 11/2015 due to migraines; h/o binge drinking in college  Drugs - Pt denies   Caffeine- minimal- sometimes tea      Review of Systems   PSYCHIATRIC: Pertinent items are noted in the narrative.        CONSTITUTIONAL: weight stable    Past Medical, Family and Social History: The patient's past medical, family and social history have been reviewed and updated as appropriate within the electronic medical record. See encounter notes.     Current Psychiatric Medication:  Buspar 10 mg BID, duloxetine 60 mg.     Compliance: yes      Side effects: Pt denied     Risk Parameters:  Patient reports no suicidal ideation  Patient reports no homicidal ideation  Patient reports no self-injurious behavior  Patient reports no violent behavior     Exam (detailed: at least 9 elements; comprehensive: all 15 elements)   Constitutional  Vitals:  Most recent vital signs, dated less than 90 days prior to this appointment, were reviewed. BP: ()/()   Arterial Line BP: ()/()       General:  unremarkable, age appropriate, casual attire, good eye contact, good rapport       Musculoskeletal  Muscle Strength/Tone:  no flaccidity, no tremor    Gait & Station:  normal      Psychiatric                       Speech:  normal tone, normal rate, rhythm, and volume   Mood & Affect:   stable         Thought Process:   Goal directed; Linear    Associations:   intact   Thought Content:   No SI/HI, delusions, or paranoia, no AV/VH   Insight & Judgement:   Good, adequate to circumstances    Orientation:   grossly intact; alert and oriented x 4    Memory:  intact for content of interview    Language:  grossly intact, can repeat    Attention Span  : Grossly intact for content of interview   Fund of Knowledge:   intact and appropriate to age and level of education        Assessment and Diagnosis   Status/Progress: Based on the examination today, the patient's problem(s) is/are adequately controlled. New problems have not been presented today. Co-morbidities are not complicating management of the primary condition. There are active rule-out diagnoses for this patient at this time.      Impression: Pt's mood appears to be stable with increased anxiety associated with work stressor. Pt does have a positive ADHD evaluation history and it appears that the cognitive demands at her new job are exceeding her coping resources. Will start a trial of Vyvanse given a positive history of such and evaluate moving forward.     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 Cont buspar 10 mg po bid    2. Cont duloxetine to 60 mg     3. Call to report any worsening of symptoms or problems with the medication. Pt instructed to go to ER with thoughts of harming self, others     4. Patient given contact # for psychotherapists at [facility] and also instructed to check with insurance for list of providers.     Psychotherapy: 20 minutes  Target symptoms: loneliness, mood  Why chosen therapy is appropriate versus another modality: CBT used; relevant to diagnosis, patient responds to this modality  Outcome monitoring methods: self-report, observation  Therapeutic intervention type: Cognitive Behavioral Therapy, Motivational Interviewing  Topics discussed/themes: building skills  sets for symptom management, symptom recognition, nutrition, exercise  The patient's response to the intervention is accepting  Patient's response to treatment is: good.   The patient's progress toward treatment goals: limited      Return to clinic: 3 months    -Cognitive-Behavioral/Supportive therapy and psychoeducation provided  -R/B/SE's of medications discussed with the pt who expresses understanding and chooses to take medications as prescribed.   -Pt instructed to call clinic, 911 or go to nearest emergency room if sxs worsen or pt is in   crisis. The pt expresses understanding.    Carroll Trent, PhD, MP     Antidepressant/Antianxiety Medication Initiation:  Patient informed of risks, benefits, and potential side effects of medication and accepts informed consent.  Common side effects include nausea, fatigue, headache, insomnia., Specifically discussed the possibility of new or worsening suicidal thoughts/depression.  Patient instructed to stop the medication immediately and seek urgent treatment if this occurs. Patient instructed not to abruptly discontinue medication without physician guidance except in cases of sudden onset or worsening of SI.       Pregnancy Warning:  Patient denies current pregnancy possibility.  Patient made aware that medications have not been proven safe in pregnancy and that she must maintain adequate birth control.  Patient instructed to alert us immediately if she becomes pregnant.      Stimulant Medication Initiation:  Patient advised of risks, benefits, and side effects of medication and accepts informed consent.  Common side effects include insomnia, irritability, jittery feeling, dry mouth, and agitation/hostility., Patient advised of potential addictive nature of medication and controlled substance classification.  Instructed to safeguard medication as no early refills can be given for lost or stolen medications.

## 2024-02-06 ENCOUNTER — OFFICE VISIT (OUTPATIENT)
Dept: PSYCHIATRY | Facility: CLINIC | Age: 44
End: 2024-02-06
Payer: COMMERCIAL

## 2024-02-06 DIAGNOSIS — F39 MOOD DISORDER: Primary | ICD-10-CM

## 2024-02-06 DIAGNOSIS — F90.0 ATTENTION DEFICIT HYPERACTIVITY DISORDER, INATTENTIVE TYPE: ICD-10-CM

## 2024-02-06 DIAGNOSIS — F41.1 GAD (GENERALIZED ANXIETY DISORDER): ICD-10-CM

## 2024-02-06 PROCEDURE — 99214 OFFICE O/P EST MOD 30 MIN: CPT | Mod: 95,,, | Performed by: PSYCHOLOGIST

## 2024-02-06 PROCEDURE — 1159F MED LIST DOCD IN RCRD: CPT | Mod: CPTII,95,, | Performed by: PSYCHOLOGIST

## 2024-02-06 PROCEDURE — 90833 PSYTX W PT W E/M 30 MIN: CPT | Mod: 95,,, | Performed by: PSYCHOLOGIST

## 2024-02-06 RX ORDER — DULOXETIN HYDROCHLORIDE 60 MG/1
60 CAPSULE, DELAYED RELEASE ORAL DAILY
Qty: 90 CAPSULE | Refills: 0 | Status: SHIPPED | OUTPATIENT
Start: 2024-02-06 | End: 2024-05-21 | Stop reason: SDUPTHER

## 2024-02-06 RX ORDER — BUSPIRONE HYDROCHLORIDE 10 MG/1
10 TABLET ORAL 2 TIMES DAILY
Qty: 180 TABLET | Refills: 0 | Status: SHIPPED | OUTPATIENT
Start: 2024-02-06 | End: 2025-02-05

## 2024-02-07 ENCOUNTER — PATIENT MESSAGE (OUTPATIENT)
Dept: PSYCHIATRY | Facility: CLINIC | Age: 44
End: 2024-02-07

## 2024-04-22 NOTE — TELEPHONE ENCOUNTER
Last filled 3/2/23, last virtual visit 7/24/23, no up coming visit at this time, pt is on the 6 month recall list  
Fall Risk

## 2024-05-21 DIAGNOSIS — F39 MOOD DISORDER: ICD-10-CM

## 2024-05-21 DIAGNOSIS — F41.1 GAD (GENERALIZED ANXIETY DISORDER): ICD-10-CM

## 2024-05-22 RX ORDER — DULOXETIN HYDROCHLORIDE 60 MG/1
60 CAPSULE, DELAYED RELEASE ORAL DAILY
Qty: 30 CAPSULE | Refills: 0 | Status: SHIPPED | OUTPATIENT
Start: 2024-05-22 | End: 2025-05-22

## 2024-08-26 DIAGNOSIS — F39 MOOD DISORDER: ICD-10-CM

## 2024-08-26 DIAGNOSIS — F41.1 GAD (GENERALIZED ANXIETY DISORDER): ICD-10-CM

## 2024-08-26 RX ORDER — DULOXETIN HYDROCHLORIDE 60 MG/1
60 CAPSULE, DELAYED RELEASE ORAL DAILY
Qty: 30 CAPSULE | Refills: 0 | OUTPATIENT
Start: 2024-08-26 | End: 2025-08-26

## 2024-08-27 ENCOUNTER — OFFICE VISIT (OUTPATIENT)
Dept: PSYCHIATRY | Facility: CLINIC | Age: 44
End: 2024-08-27
Payer: COMMERCIAL

## 2024-08-27 DIAGNOSIS — F41.1 GAD (GENERALIZED ANXIETY DISORDER): ICD-10-CM

## 2024-08-27 DIAGNOSIS — F39 MOOD DISORDER: Primary | ICD-10-CM

## 2024-08-27 DIAGNOSIS — F90.0 ATTENTION DEFICIT HYPERACTIVITY DISORDER, INATTENTIVE TYPE: ICD-10-CM

## 2024-08-27 PROCEDURE — 90833 PSYTX W PT W E/M 30 MIN: CPT | Mod: 95,,, | Performed by: PSYCHOLOGIST

## 2024-08-27 PROCEDURE — 1159F MED LIST DOCD IN RCRD: CPT | Mod: CPTII,95,, | Performed by: PSYCHOLOGIST

## 2024-08-27 PROCEDURE — G2211 COMPLEX E/M VISIT ADD ON: HCPCS | Mod: 95,,, | Performed by: PSYCHOLOGIST

## 2024-08-27 PROCEDURE — 99214 OFFICE O/P EST MOD 30 MIN: CPT | Mod: 95,,, | Performed by: PSYCHOLOGIST

## 2024-08-27 RX ORDER — BUSPIRONE HYDROCHLORIDE 10 MG/1
10 TABLET ORAL 2 TIMES DAILY
Qty: 180 TABLET | Refills: 0 | Status: SHIPPED | OUTPATIENT
Start: 2024-08-27 | End: 2025-08-27

## 2024-08-27 RX ORDER — DULOXETIN HYDROCHLORIDE 60 MG/1
60 CAPSULE, DELAYED RELEASE ORAL DAILY
Qty: 90 CAPSULE | Refills: 0 | Status: SHIPPED | OUTPATIENT
Start: 2024-08-27 | End: 2025-08-27

## 2024-08-27 NOTE — PROGRESS NOTES
The patient location is: [place], [place]  The patient location [place] is: [place]  The patient phone number is: [phone]  Visit type: Virtual visit with synchronous audio and video  Each patient to whom he or she provides medical services by telemedicine is:  (1) informed of the relationship between the physician and patient and the respective role of any other health care provider with respect to management of the patient; and (2) notified that he or she may decline to receive medical services by telemedicine and may withdraw from such care at any time.     Outpatient Psychiatry Follow-Up Visit    Clinical Status of Patient: Outpatient (Ambulatory)  08/26/2024     Chief Complaint: depression, anxiety     Interval History and Content of Current Session:  Interim Events/Subjective Report/Content of Current Session:  follow-up appointment with new provider as previous provider has relocated.    Pt is a 40 y/o female with past psychiatric hx of depression, anxiety, and ADHD who presents for follow-up treatment. Pt reported that she is doing well. Stated that she is experiencing some anxiety. Pt identified stressors (health concerns with pet, work, etc) that may be contributing to anxiety. Discussed joining a dance group and has been avoiding gatherings due to anxiety. Has not been taking the buspirone. Continues to have difficulty using the CPAP machine.     Past Psychiatric hx:  zoloft 100mg (ineffective), effexor 75mg po qam (through PCP, can't recall effect), trazodone (nightmares), wellbutrin xl 150mg (h/a's and tmj), klonopin, ambien (ineffective), strattera 60mg po daily, lamotrigine, fluoxetine (smells increased), Lexapro 20 mg, Vyvanse (insurance was not covering it)    Past Medical hx:   Past Medical History:   Diagnosis Date    Allergy     Anxiety     Colon polyps     CPAP (continuous positive airway pressure) dependence     Depression     Diverticulitis     GERD (gastroesophageal reflux disease)      Hiatal hernia with GERD without esophagitis 07/14/2016    EGD per Dr. Narciso Siddiqi    Hypertension     Polycystic ovarian syndrome     PONV (postoperative nausea and vomiting)     Rectal bleeding     Sleep apnea         Interim hx:  Medication changes last visit: none  Anxiety: stable  Depression: stable     Denies suicidal/homicidal ideations.  Denies hopelessness/worthlessness.    Denies auditory/visual hallucinations      SubstHx:   Tobacco - Pt denies  Etoh - quit 11/2015 due to migraines; h/o binge drinking in college  Drugs - Pt denies   Caffeine- minimal- sometimes tea      Review of Systems   PSYCHIATRIC: Pertinent items are noted in the narrative.        CONSTITUTIONAL: weight stable    Past Medical, Family and Social History: The patient's past medical, family and social history have been reviewed and updated as appropriate within the electronic medical record. See encounter notes.     Current Psychiatric Medication:  Buspar 10 mg BID, duloxetine 60 mg.     Compliance: yes      Side effects: Pt denied     Risk Parameters:  Patient reports no suicidal ideation  Patient reports no homicidal ideation  Patient reports no self-injurious behavior  Patient reports no violent behavior     Exam (detailed: at least 9 elements; comprehensive: all 15 elements)   Constitutional  Vitals:  Most recent vital signs, dated less than 90 days prior to this appointment, were reviewed. BP: ()/()   Arterial Line BP: ()/()       General:  unremarkable, age appropriate, casual attire, good eye contact, good rapport       Musculoskeletal  Muscle Strength/Tone:  no flaccidity, no tremor    Gait & Station:  normal      Psychiatric                       Speech:  normal tone, normal rate, rhythm, and volume   Mood & Affect:   stable         Thought Process:   Goal directed; Linear    Associations:   intact   Thought Content:   No SI/HI, delusions, or paranoia, no AV/VH   Insight & Judgement:   Good, adequate to circumstances    Orientation:   grossly intact; alert and oriented x 4    Memory:  intact for content of interview    Language:  grossly intact, can repeat    Attention Span  : Grossly intact for content of interview   Fund of Knowledge:   intact and appropriate to age and level of education        Assessment and Diagnosis   Status/Progress: Based on the examination today, the patient's problem(s) is/are adequately controlled. New problems have not been presented today. Co-morbidities are not complicating management of the primary condition. There are active rule-out diagnoses for this patient at this time.      Impression: Pt's mood appears to be stable with increased anxiety associated with stressors. Pt discussed avoidance coping and we discussed the difficulty with such and encouraged exposure techniques. Encouraged pt to start taking the buspirone.     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 Cont buspar 10 mg po bid    2. Cont duloxetine to 60 mg     3. Call to report any worsening of symptoms or problems with the medication. Pt instructed to go to ER with thoughts of harming self, others     4. Patient given contact # for psychotherapists at [facility] and also instructed to check with insurance for list of providers.     Psychotherapy: 18 minutes  Target symptoms: anxiety  Why chosen therapy is appropriate versus another modality: CBT used; relevant to diagnosis, patient responds to this modality  Outcome monitoring methods: self-report, observation  Therapeutic intervention type: Exposure  Topics discussed/themes: building skills sets for symptom management, symptom recognition, nutrition, exercise  The patient's response to the intervention is accepting  Patient's  response to treatment is: good.   The patient's progress toward treatment goals: limited      Return to clinic: 3 months    -Cognitive-Behavioral/Supportive therapy and psychoeducation provided  -R/B/SE's of medications discussed with the pt who expresses understanding and chooses to take medications as prescribed.   -Pt instructed to call clinic, 911 or go to nearest emergency room if sxs worsen or pt is in   crisis. The pt expresses understanding.    Carroll Trent, PhD, MP    Visit today included increased complexity associated with the care of the episodic problem anxiety, depression addressed and managing the longitudinal care of the patient due to the serious and/or complex managed problem(s) anxiety, depression.      Antidepressant/Antianxiety Medication Initiation:  Patient informed of risks, benefits, and potential side effects of medication and accepts informed consent.  Common side effects include nausea, fatigue, headache, insomnia., Specifically discussed the possibility of new or worsening suicidal thoughts/depression.  Patient instructed to stop the medication immediately and seek urgent treatment if this occurs. Patient instructed not to abruptly discontinue medication without physician guidance except in cases of sudden onset or worsening of SI.       Pregnancy Warning:  Patient denies current pregnancy possibility.  Patient made aware that medications have not been proven safe in pregnancy and that she must maintain adequate birth control.  Patient instructed to alert us immediately if she becomes pregnant.      Stimulant Medication Initiation:  Patient advised of risks, benefits, and side effects of medication and accepts informed consent.  Common side effects include insomnia, irritability, jittery feeling, dry mouth, and agitation/hostility., Patient advised of potential addictive nature of medication and controlled substance classification.  Instructed to safeguard medication as no early  refills can be given for lost or stolen medications.

## 2024-12-18 DIAGNOSIS — F39 MOOD DISORDER: ICD-10-CM

## 2024-12-18 DIAGNOSIS — F41.1 GAD (GENERALIZED ANXIETY DISORDER): ICD-10-CM

## 2024-12-18 RX ORDER — BUSPIRONE HYDROCHLORIDE 10 MG/1
10 TABLET ORAL 2 TIMES DAILY
Qty: 180 TABLET | Refills: 0 | Status: SHIPPED | OUTPATIENT
Start: 2024-12-18 | End: 2025-12-18

## 2024-12-18 RX ORDER — DULOXETIN HYDROCHLORIDE 60 MG/1
60 CAPSULE, DELAYED RELEASE ORAL DAILY
Qty: 90 CAPSULE | Refills: 0 | Status: SHIPPED | OUTPATIENT
Start: 2024-12-18 | End: 2025-12-18

## 2024-12-30 NOTE — PROGRESS NOTES
The patient location is: [place], [place]  The patient location [place] is: [place]  The patient phone number is: [phone]  Visit type: Virtual visit with synchronous audio and video  Each patient to whom he or she provides medical services by telemedicine is:  (1) informed of the relationship between the physician and patient and the respective role of any other health care provider with respect to management of the patient; and (2) notified that he or she may decline to receive medical services by telemedicine and may withdraw from such care at any time.     Outpatient Psychiatry Follow-Up Visit    Clinical Status of Patient: Outpatient (Ambulatory)  12/31/2024     Chief Complaint: depression, anxiety     Interval History and Content of Current Session:  Interim Events/Subjective Report/Content of Current Session:  follow-up appointment.    Pt is a 40 y/o female with past psychiatric hx of depression, anxiety, and ADHD who presents for follow-up treatment. Pt reported that her mood is stable. Continues to have anxiety and feels like baseline anxiety elevated. Mostly comprised of cognitive worry. Pt noted that she continues to engage in activities and is not avoiding or escaping. 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     Past Psychiatric hx:  zoloft 100mg (ineffective), effexor 75mg po qam (through PCP, can't recall effect), trazodone (nightmares), wellbutrin xl 150mg (h/a's and tmj), klonopin, ambien (ineffective), strattera 60mg po daily, lamotrigine, fluoxetine (smells increased), Lexapro 20 mg, Vyvanse (insurance was not covering it)    Past Medical hx:   Past Medical History:   Diagnosis Date    Allergy     Anxiety     Colon polyps     CPAP (continuous positive airway pressure) dependence     Depression     Diverticulitis     GERD (gastroesophageal reflux disease)     Hiatal hernia with GERD without esophagitis 07/14/2016    EGD per Dr. Narciso Siddiqi    Hypertension     Polycystic  ovarian syndrome     PONV (postoperative nausea and vomiting)     Rectal bleeding     Sleep apnea         Interim hx:  Medication changes last visit: none  Anxiety: mild  Depression: stable     Denies suicidal/homicidal ideations.  Denies hopelessness/worthlessness.    Denies auditory/visual hallucinations      SubstHx:   Tobacco - Pt denies  Etoh - quit 11/2015 due to migraines; h/o binge drinking in college  Drugs - Pt denies   Caffeine- minimal- sometimes tea      Review of Systems   PSYCHIATRIC: Pertinent items are noted in the narrative.        CONSTITUTIONAL: weight stable    Past Medical, Family and Social History: The patient's past medical, family and social history have been reviewed and updated as appropriate within the electronic medical record. See encounter notes.     Current Psychiatric Medication:  Buspar 10 mg BID, duloxetine 60 mg.     Compliance: yes      Side effects: Pt denied     Risk Parameters:  Patient reports no suicidal ideation  Patient reports no homicidal ideation  Patient reports no self-injurious behavior  Patient reports no violent behavior     Exam (detailed: at least 9 elements; comprehensive: all 15 elements)   Constitutional  Vitals:  Most recent vital signs, dated less than 90 days prior to this appointment, were reviewed. BP: ()/()   Arterial Line BP: ()/()       General:  unremarkable, age appropriate, casual attire, good eye contact, good rapport       Musculoskeletal  Muscle Strength/Tone:  no flaccidity, no tremor    Gait & Station:  normal      Psychiatric                       Speech:  normal tone, normal rate, rhythm, and volume   Mood & Affect:   Mild anxiety         Thought Process:   Goal directed; Linear    Associations:   intact   Thought Content:   No SI/HI, delusions, or paranoia, no AV/VH   Insight & Judgement:   Good, adequate to circumstances   Orientation:   grossly intact; alert and oriented x 4    Memory:  intact for content of interview    Language:   grossly intact, can repeat    Attention Span  : Grossly intact for content of interview   Fund of Knowledge:   intact and appropriate to age and level of education        Assessment and Diagnosis   Status/Progress: Based on the examination today, the patient's problem(s) is/are adequately controlled. New problems have not been presented today. Co-morbidities are not complicating management of the primary condition. There are active rule-out diagnoses for this patient at this time.      Impression: Pt's mood appears to be stable with transient worries. CBT techniques discussed in session and practiced. Will continue with medication plan as is and monitor moving forward.    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 Cont buspar 10 mg po bid    2. Cont duloxetine to 60 mg     3. Call to report any worsening of symptoms or problems with the medication. Pt instructed to go to ER with thoughts of harming self, others     4. Patient given contact # for psychotherapists at [facility] and also instructed to check with insurance for list of providers.     Psychotherapy: 20 minutes  Target symptoms: anxiety  Why chosen therapy is appropriate versus another modality: CBT used; relevant to diagnosis, patient responds to this modality  Outcome monitoring methods: self-report, observation  Therapeutic intervention type: CBT techniques  Topics discussed/themes: building skills sets for symptom management, symptom recognition, nutrition, exercise  The patient's response to the intervention is accepting  Patient's response to treatment is: good.   The patient's progress toward treatment goals: limited      Return to clinic: 3 months    -Cognitive-Behavioral/Supportive therapy and  psychoeducation provided  -R/B/SE's of medications discussed with the pt who expresses understanding and chooses to take medications as prescribed.   -Pt instructed to call clinic, 911 or go to nearest emergency room if sxs worsen or pt is in   crisis. The pt expresses understanding.    Carroll Trent, PhD, MP    Visit today included increased complexity associated with the care of the episodic problem anxiety, depression addressed and managing the longitudinal care of the patient due to the serious and/or complex managed problem(s) anxiety, depression.      Antidepressant/Antianxiety Medication Initiation:  Patient informed of risks, benefits, and potential side effects of medication and accepts informed consent.  Common side effects include nausea, fatigue, headache, insomnia., Specifically discussed the possibility of new or worsening suicidal thoughts/depression.  Patient instructed to stop the medication immediately and seek urgent treatment if this occurs. Patient instructed not to abruptly discontinue medication without physician guidance except in cases of sudden onset or worsening of SI.       Pregnancy Warning:  Patient denies current pregnancy possibility.  Patient made aware that medications have not been proven safe in pregnancy and that she must maintain adequate birth control.  Patient instructed to alert us immediately if she becomes pregnant.

## 2024-12-31 ENCOUNTER — OFFICE VISIT (OUTPATIENT)
Dept: PSYCHIATRY | Facility: CLINIC | Age: 44
End: 2024-12-31
Payer: COMMERCIAL

## 2024-12-31 DIAGNOSIS — F41.1 GAD (GENERALIZED ANXIETY DISORDER): ICD-10-CM

## 2024-12-31 DIAGNOSIS — F39 MOOD DISORDER: Primary | ICD-10-CM

## 2025-02-03 ENCOUNTER — TELEPHONE (OUTPATIENT)
Dept: HEPATOLOGY | Facility: CLINIC | Age: 45
End: 2025-02-03
Payer: COMMERCIAL

## 2025-02-03 DIAGNOSIS — J01.11 ACUTE RECURRENT FRONTAL SINUSITIS: Primary | ICD-10-CM

## 2025-02-03 RX ORDER — LEVOFLOXACIN 500 MG/1
500 TABLET, FILM COATED ORAL DAILY
Qty: 10 TABLET | Refills: 0 | Status: SHIPPED | OUTPATIENT
Start: 2025-02-03

## 2025-03-16 ENCOUNTER — PATIENT MESSAGE (OUTPATIENT)
Dept: PSYCHIATRY | Facility: CLINIC | Age: 45
End: 2025-03-16
Payer: COMMERCIAL

## 2025-03-17 NOTE — TELEPHONE ENCOUNTER
Please contact this pt to see if she would like to move to an earlier appointment. I have some openings today.

## 2025-03-27 ENCOUNTER — OFFICE VISIT (OUTPATIENT)
Dept: PSYCHIATRY | Facility: CLINIC | Age: 45
End: 2025-03-27
Payer: COMMERCIAL

## 2025-03-27 DIAGNOSIS — F39 MOOD DISORDER: Primary | ICD-10-CM

## 2025-03-27 DIAGNOSIS — F41.1 GAD (GENERALIZED ANXIETY DISORDER): ICD-10-CM

## 2025-03-27 DIAGNOSIS — F90.0 ATTENTION DEFICIT HYPERACTIVITY DISORDER, INATTENTIVE TYPE: ICD-10-CM

## 2025-03-27 RX ORDER — DULOXETIN HYDROCHLORIDE 30 MG/1
30 CAPSULE, DELAYED RELEASE ORAL DAILY
Qty: 30 CAPSULE | Refills: 1 | Status: SHIPPED | OUTPATIENT
Start: 2025-03-27 | End: 2026-03-27

## 2025-03-27 NOTE — PROGRESS NOTES
The patient location is: In car outside of work  The chief complaint leading to consultation is: mood, anxiety    Visit type: audiovisual    25 minutes of total time spent on the encounter, which includes face to face time and non-face to face time preparing to see the patient (eg, review of tests), Obtaining and/or reviewing separately obtained history, Documenting clinical information in the electronic or other health record, Independently interpreting results (not separately reported) and communicating results to the patient/family/caregiver, or Care coordination (not separately reported).     Each patient to whom he or she provides medical services by telemedicine is:  (1) informed of the relationship between the physician and patient and the respective role of any other health care provider with respect to management of the patient; and (2) notified that he or she may decline to receive medical services by telemedicine and may withdraw from such care at any time.    Notes:      Outpatient Psychiatry Follow-Up Visit    Clinical Status of Patient: Outpatient (Ambulatory)  03/27/2025     Chief Complaint: depression, anxiety     Interval History and Content of Current Session:  Interim Events/Subjective Report/Content of Current Session:  follow-up appointment.    Pt is a 42 y/o female with past psychiatric hx of depression, anxiety, and ADHD who presents for follow-up treatment. 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 Some concern about losing her job due to government cut in spending. Stated that she is trying to focus on things she can manage and do something about them. Trying not to ruminate on things she cannot do anything about. Noted some emotional blunting and not feeling pleasure. Denies depression or anxiety symptoms. No taking the buspar.     Past Psychiatric hx:  zoloft 100mg (ineffective), effexor 75mg po qam (through PCP, can't recall effect), trazodone (nightmares),  wellbutrin xl 150mg (h/a's and tmj), klonopin, ambien (ineffective), strattera 60mg po daily, lamotrigine, fluoxetine (smells increased), Lexapro 20 mg, Vyvanse (insurance was not covering it)    Past Medical hx:   Past Medical History:   Diagnosis Date    Allergy     Anxiety     Colon polyps     CPAP (continuous positive airway pressure) dependence     Depression     Diverticulitis     GERD (gastroesophageal reflux disease)     Hiatal hernia with GERD without esophagitis 07/14/2016    EGD per Dr. Narciso Siddiqi    Hypertension     Polycystic ovarian syndrome     PONV (postoperative nausea and vomiting)     Rectal bleeding     Sleep apnea         Interim hx:  Medication changes last visit: none  Anxiety: mild  Depression: stable     Denies suicidal/homicidal ideations.  Denies hopelessness/worthlessness.    Denies auditory/visual hallucinations      SubstHx:   Tobacco - Pt denies  Etoh - quit 11/2015 due to migraines; h/o binge drinking in college  Drugs - Pt denies   Caffeine- minimal- sometimes tea      Review of Systems   PSYCHIATRIC: Pertinent items are noted in the narrative.        CONSTITUTIONAL: weight stable    Past Medical, Family and Social History: The patient's past medical, family and social history have been reviewed and updated as appropriate within the electronic medical record. See encounter notes.     Current Psychiatric Medication:  Buspar 10 mg BID, duloxetine 60 mg.     Compliance: yes      Side effects: Pt denied     Risk Parameters:  Patient reports no suicidal ideation  Patient reports no homicidal ideation  Patient reports no self-injurious behavior  Patient reports no violent behavior     Exam (detailed: at least 9 elements; comprehensive: all 15 elements)   Constitutional  Vitals:  Most recent vital signs, dated less than 90 days prior to this appointment, were reviewed.        General:  unremarkable, age appropriate, casual attire, good eye contact, good rapport        Musculoskeletal  Muscle Strength/Tone:  no flaccidity, no tremor    Gait & Station:  normal      Psychiatric                       Speech:  normal tone, normal rate, rhythm, and volume   Mood & Affect:   Mild anxiety         Thought Process:   Goal directed; Linear    Associations:   intact   Thought Content:   No SI/HI, delusions, or paranoia, no AV/VH   Insight & Judgement:   Good, adequate to circumstances   Orientation:   grossly intact; alert and oriented x 4    Memory:  intact for content of interview    Language:  grossly intact, can repeat    Attention Span  : Grossly intact for content of interview   Fund of Knowledge:   intact and appropriate to age and level of education        Assessment and Diagnosis   Status/Progress: Based on the examination today, the patient's problem(s) is/are adequately controlled. New problems have not been presented today. Co-morbidities are not complicating management of the primary condition. There are active rule-out diagnoses for this patient at this time.      Impression: Pt presents with stable mood and anxiety; however, she may be experiencing some emotional blunting from the duloxetine. Will decrease the dose and monitor moving forward.      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 D/C buspar 10 mg po bid    2. Decrease the duloxetine to 30 mg     3. Call to report any worsening of symptoms or problems with the medication. Pt instructed to go to ER with thoughts of harming self, others     4. Patient given contact # for psychotherapists at [facility] and also instructed to check with insurance for list of providers.     Psychotherapy: none  Target symptoms: anxiety  Why chosen therapy is appropriate versus another  modality: CBT used; relevant to diagnosis, patient responds to this modality  Outcome monitoring methods: self-report, observation  Therapeutic intervention type: CBT techniques  Topics discussed/themes: building skills sets for symptom management, symptom recognition, nutrition, exercise  The patient's response to the intervention is accepting  Patient's response to treatment is: good.   The patient's progress toward treatment goals: limited      Return to clinic: 1 month    -Cognitive-Behavioral/Supportive therapy and psychoeducation provided  -R/B/SE's of medications discussed with the pt who expresses understanding and chooses to take medications as prescribed.   -Pt instructed to call clinic, 911 or go to nearest emergency room if sxs worsen or pt is in   crisis. The pt expresses understanding.    Carroll Trent, PhD, MP    Visit today included increased complexity associated with the care of the episodic problem anxiety, depression addressed and managing the longitudinal care of the patient due to the serious and/or complex managed problem(s) anxiety, depression.      Antidepressant/Antianxiety Medication Initiation:  Patient informed of risks, benefits, and potential side effects of medication and accepts informed consent.  Common side effects include nausea, fatigue, headache, insomnia., Specifically discussed the possibility of new or worsening suicidal thoughts/depression.  Patient instructed to stop the medication immediately and seek urgent treatment if this occurs. Patient instructed not to abruptly discontinue medication without physician guidance except in cases of sudden onset or worsening of SI.       Pregnancy Warning:  Patient denies current pregnancy possibility.  Patient made aware that medications have not been proven safe in pregnancy and that she must maintain adequate birth control.  Patient instructed to alert us immediately if she becomes pregnant.

## 2025-04-21 RX ORDER — DULOXETIN HYDROCHLORIDE 60 MG/1
60 CAPSULE, DELAYED RELEASE ORAL
Qty: 30 CAPSULE | Refills: 0 | Status: SHIPPED | OUTPATIENT
Start: 2025-04-21

## 2025-04-29 NOTE — PROGRESS NOTES
The patient location is: home  The chief complaint leading to consultation is: mood, anxiety    Visit type: audiovisual    25 minutes of total time spent on the encounter, which includes face to face time and non-face to face time preparing to see the patient (eg, review of tests), Obtaining and/or reviewing separately obtained history, Documenting clinical information in the electronic or other health record, Independently interpreting results (not separately reported) and communicating results to the patient/family/caregiver, or Care coordination (not separately reported).     Each patient to whom he or she provides medical services by telemedicine is:  (1) informed of the relationship between the physician and patient and the respective role of any other health care provider with respect to management of the patient; and (2) notified that he or she may decline to receive medical services by telemedicine and may withdraw from such care at any time.    Notes:      Outpatient Psychiatry Follow-Up Visit    Clinical Status of Patient: Outpatient (Ambulatory)  04/30/2025     Chief Complaint: depression, anxiety     Interval History and Content of Current Session:  Interim Events/Subjective Report/Content of Current Session:  follow-up appointment.    Pt is a 42 y/o female with past psychiatric hx of depression, anxiety, and ADHD who presents for follow-up treatment. Patient reports feeling less foggy and able to think more clearly since decreasing the duloxetine dose to 30 mg, without experiencing increased anxiety or depression. She describes feeling more excited about things, which she considers more characteristic of her personality. Patient attended a social event (a crawfish boil) that she would have typically avoided due to anticipatory anxiety. She stayed for a long time and had a good time, noting that she felt fine about going without overthinking it. Patient expresses interest in potentially discontinuing her  medication, noting that she feels a lot better. She estimates it has been about 10 years since she was last off medication. Patient describes the last two days at work as steady and busy, which she prefers. She feels more secure in her job after a conversation with her boss, who indicated she would have already been let go if that was the intention.    Past Psychiatric hx:  zoloft 100mg (ineffective), effexor 75mg po qam (through PCP, can't recall effect), trazodone (nightmares), wellbutrin xl 150mg (h/a's and tmj), klonopin, ambien (ineffective), strattera 60mg po daily, lamotrigine, fluoxetine (smells increased), Lexapro 20 mg, Vyvanse (insurance was not covering it)    Past Medical hx:   Past Medical History:   Diagnosis Date    Allergy     Anxiety     Colon polyps     CPAP (continuous positive airway pressure) dependence     Depression     Diverticulitis     GERD (gastroesophageal reflux disease)     Hiatal hernia with GERD without esophagitis 07/14/2016    EGD per Dr. Narciso Siddiqi    Hypertension     Polycystic ovarian syndrome     PONV (postoperative nausea and vomiting)     Rectal bleeding     Sleep apnea         Interim hx:  Medication changes last visit: D/C buspar 10 mg po bid, decrease duloxetine to 30 mg     Denies suicidal/homicidal ideations.  Denies hopelessness/worthlessness.    Denies auditory/visual hallucinations      SubstHx:   Tobacco - Pt denies  Etoh - quit 11/2015 due to migraines; h/o binge drinking in college  Drugs - Pt denies   Caffeine- minimal- sometimes tea      Review of Systems   PSYCHIATRIC: Pertinent items are noted in the narrative.        CONSTITUTIONAL: weight stable    Past Medical, Family and Social History: The patient's past medical, family and social history have been reviewed and updated as appropriate within the electronic medical record. See encounter notes.     Current Psychiatric Medication:  duloxetine 30 mg.     Compliance: yes      Side effects: Pt denied     Risk  Parameters:  Patient reports no suicidal ideation  Patient reports no homicidal ideation  Patient reports no self-injurious behavior  Patient reports no violent behavior     Exam (detailed: at least 9 elements; comprehensive: all 15 elements)   Constitutional  Vitals:  Most recent vital signs, dated less than 90 days prior to this appointment, were reviewed. BP: ()/()   Arterial Line BP: ()/()       General:  unremarkable, age appropriate, casual attire, good eye contact, good rapport       Musculoskeletal  Muscle Strength/Tone:  no flaccidity, no tremor    Gait & Station:  normal      Psychiatric                       Speech:  normal tone, normal rate, rhythm, and volume   Mood & Affect:   Mild anxiety         Thought Process:   Goal directed; Linear    Associations:   intact   Thought Content:   No SI/HI, delusions, or paranoia, no AV/VH   Insight & Judgement:   Good, adequate to circumstances   Orientation:   grossly intact; alert and oriented x 4    Memory:  intact for content of interview    Language:  grossly intact, can repeat    Attention Span  : Grossly intact for content of interview   Fund of Knowledge:   intact and appropriate to age and level of education        Assessment and Diagnosis   IMPRESSION:  - Assessed response to previous duloxetine dose reduction to 30 mg, noting improved clarity of thought without increased anxiety or depression.  - Evaluated social engagement and ability to manage anticipatory anxiety, observing positive progress.  - Considered request to discontinue medication, weighing risks and benefits given long-term use and current stability.    PLAN SUMMARY:  - Initiate gradual taper of duloxetine from 30 mg to 20 mg daily  - Discuss and encourage recognition of anticipatory anxiety in social situations  - Reassess potential discontinuation of duloxetine in 6 weeks    ANXIETY DISORDER:  - Discussed concept of anticipatory anxiety and encouraged patient to recognize and challenge  this pattern in social situations.    MAJOR DEPRESSIVE DISORDER:  - Initiated gradual taper of duloxetine from 30 mg to 20 mg daily for 6 weeks before reassessing for potential discontinuation.     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 Taper duloxetine to 20 mg     2. Call to report any worsening of symptoms or problems with the medication. Pt instructed to go to ER with thoughts of harming self, others     3. Patient given contact # for psychotherapists at [facility] and also instructed to check with insurance for list of providers.     Psychotherapy: 20 minutes  Target symptoms: anxiety  Why chosen therapy is appropriate versus another modality: CBT used; relevant to diagnosis, patient responds to this modality  Outcome monitoring methods: self-report, observation  Therapeutic intervention type: CBT techniques  Topics discussed/themes: building skills sets for symptom management, symptom recognition, nutrition, exercise  The patient's response to the intervention is accepting  Patient's response to treatment is: good.   The patient's progress toward treatment goals: limited      Return to clinic: 6 weeks    -Cognitive-Behavioral/Supportive therapy and psychoeducation provided  -R/B/SE's of medications discussed with the pt who expresses understanding and chooses to take medications as prescribed.   -Pt instructed to call clinic, 911 or go to nearest emergency room if sxs worsen or pt is in   crisis. The pt expresses understanding.    Carroll Trent, PhD, MP    Visit today included increased complexity associated with the care of the episodic problem anxiety, depression addressed and managing the longitudinal care of the patient due to the serious and/or  complex managed problem(s) anxiety, depression.      This note was generated with the assistance of ambient listening technology. Verbal consent was obtained by the patient and accompanying visitor(s) for the recording of patient appointment to facilitate this note. I attest to having reviewed and edited the generated note for accuracy, though some syntax or spelling errors may persist. Please contact the author of this note for any clarification.     Antidepressant/Antianxiety Medication Initiation:  Patient informed of risks, benefits, and potential side effects of medication and accepts informed consent.  Common side effects include nausea, fatigue, headache, insomnia., Specifically discussed the possibility of new or worsening suicidal thoughts/depression.  Patient instructed to stop the medication immediately and seek urgent treatment if this occurs. Patient instructed not to abruptly discontinue medication without physician guidance except in cases of sudden onset or worsening of SI.       Pregnancy Warning:  Patient denies current pregnancy possibility.  Patient made aware that medications have not been proven safe in pregnancy and that she must maintain adequate birth control.  Patient instructed to alert us immediately if she becomes pregnant.

## 2025-04-30 ENCOUNTER — OFFICE VISIT (OUTPATIENT)
Dept: PSYCHIATRY | Facility: CLINIC | Age: 45
End: 2025-04-30
Payer: COMMERCIAL

## 2025-04-30 DIAGNOSIS — F39 MOOD DISORDER: Primary | ICD-10-CM

## 2025-04-30 DIAGNOSIS — F90.0 ATTENTION DEFICIT HYPERACTIVITY DISORDER, INATTENTIVE TYPE: ICD-10-CM

## 2025-04-30 DIAGNOSIS — F41.1 GAD (GENERALIZED ANXIETY DISORDER): ICD-10-CM

## 2025-04-30 RX ORDER — DULOXETIN HYDROCHLORIDE 20 MG/1
20 CAPSULE, DELAYED RELEASE ORAL DAILY
Qty: 30 CAPSULE | Refills: 1 | Status: SHIPPED | OUTPATIENT
Start: 2025-04-30 | End: 2026-04-30

## 2025-05-01 ENCOUNTER — TELEPHONE (OUTPATIENT)
Dept: PSYCHIATRY | Facility: CLINIC | Age: 45
End: 2025-05-01
Payer: COMMERCIAL

## 2025-05-01 NOTE — TELEPHONE ENCOUNTER
Called pt to get her virtual follow up scheduled for 06/11/2025 and left message to call office to get this scheduled. She did not answer so left message.

## 2025-05-01 NOTE — TELEPHONE ENCOUNTER
----- Message from Carroll Trent sent at 4/30/2025  5:55 PM CDT -----  Regarding: apt  Pt needs a f/u virtual visit in 6 weeks.

## 2025-05-05 ENCOUNTER — PATIENT MESSAGE (OUTPATIENT)
Dept: PSYCHIATRY | Facility: CLINIC | Age: 45
End: 2025-05-05
Payer: COMMERCIAL

## 2025-05-05 NOTE — TELEPHONE ENCOUNTER
Called pt and left message to call office to get follow up visit scheduled. Also sent my chart message to get the 6 week visit scheduled

## 2025-06-03 PROBLEM — K46.0 INCARCERATED HERNIA: Status: ACTIVE | Noted: 2025-06-03

## 2025-06-10 PROBLEM — K43.2 INCISIONAL HERNIA WITHOUT OBSTRUCTION OR GANGRENE: Status: ACTIVE | Noted: 2025-06-10

## 2025-06-27 ENCOUNTER — PATIENT MESSAGE (OUTPATIENT)
Dept: PSYCHIATRY | Facility: CLINIC | Age: 45
End: 2025-06-27
Payer: COMMERCIAL

## 2025-06-29 DIAGNOSIS — F39 MOOD DISORDER: ICD-10-CM

## 2025-06-29 DIAGNOSIS — F41.1 GAD (GENERALIZED ANXIETY DISORDER): ICD-10-CM

## 2025-06-30 RX ORDER — DULOXETIN HYDROCHLORIDE 20 MG/1
20 CAPSULE, DELAYED RELEASE ORAL DAILY
Qty: 30 CAPSULE | Refills: 1 | Status: SHIPPED | OUTPATIENT
Start: 2025-06-30 | End: 2026-06-30

## 2025-06-30 NOTE — TELEPHONE ENCOUNTER
Patient Comment: I had an unexpected surgery 2 weeks ago. I wasnt able to take this medication for 3 days while at the ER and then admitted to the hospital just prior to the surgery. The side effects from stopping were too severe. I started back when I got home. I had to use all of my time off from work to recover from surgery and borrow from my future sick days. I just went back and I am concerned that the side effects from not taking the medication could cause me to miss work, and I cant right now.  Thank you       Last refill: 4/30/25  Last visit: 4/30/25  Follow up: none scheduled

## 2025-07-01 ENCOUNTER — PATIENT MESSAGE (OUTPATIENT)
Dept: PSYCHIATRY | Facility: CLINIC | Age: 45
End: 2025-07-01
Payer: COMMERCIAL

## 2025-07-18 ENCOUNTER — PATIENT MESSAGE (OUTPATIENT)
Dept: PSYCHIATRY | Facility: CLINIC | Age: 45
End: 2025-07-18
Payer: COMMERCIAL

## 2025-07-21 ENCOUNTER — OFFICE VISIT (OUTPATIENT)
Dept: PSYCHIATRY | Facility: CLINIC | Age: 45
End: 2025-07-21
Payer: COMMERCIAL

## 2025-07-21 DIAGNOSIS — F90.0 ATTENTION DEFICIT HYPERACTIVITY DISORDER, INATTENTIVE TYPE: ICD-10-CM

## 2025-07-21 DIAGNOSIS — F39 MOOD DISORDER: Primary | ICD-10-CM

## 2025-07-21 DIAGNOSIS — F41.1 GAD (GENERALIZED ANXIETY DISORDER): ICD-10-CM

## 2025-07-21 PROCEDURE — 1159F MED LIST DOCD IN RCRD: CPT | Mod: CPTII,95,, | Performed by: PSYCHOLOGIST

## 2025-07-21 PROCEDURE — G2211 COMPLEX E/M VISIT ADD ON: HCPCS | Mod: 95,,, | Performed by: PSYCHOLOGIST

## 2025-07-21 PROCEDURE — 3044F HG A1C LEVEL LT 7.0%: CPT | Mod: CPTII,95,, | Performed by: PSYCHOLOGIST

## 2025-07-21 PROCEDURE — 98006 SYNCH AUDIO-VIDEO EST MOD 30: CPT | Mod: 95,,, | Performed by: PSYCHOLOGIST

## 2025-07-21 NOTE — PROGRESS NOTES
" The patient location is: [place]  The chief complaint leading to consultation is: mood, anxiety    Visit type: audiovisual    25 minutes of total time spent on the encounter, which includes face to face time and non-face to face time preparing to see the patient (eg, review of tests), Obtaining and/or reviewing separately obtained history, Documenting clinical information in the electronic or other health record, Independently interpreting results (not separately reported) and communicating results to the patient/family/caregiver, or Care coordination (not separately reported).     Each patient to whom he or she provides medical services by telemedicine is:  (1) informed of the relationship between the physician and patient and the respective role of any other health care provider with respect to management of the patient; and (2) notified that he or she may decline to receive medical services by telemedicine and may withdraw from such care at any time.    Notes:     Outpatient Psychiatry Follow-Up Visit    Clinical Status of Patient: Outpatient (Ambulatory)  07/20/2025     Chief Complaint: depression, anxiety     Interval History and Content of Current Session:  Interim Events/Subjective Report/Content of Current Session:  follow-up appointment.    Pt is a 40 y/o female with past psychiatric hx of depression, anxiety, and ADHD who presents for follow-up treatment. Patient recently underwent hernia surgery for an incarcerated hernia. She discontinued Cymbalta for several days during hospitalization, leading to significant withdrawal symptoms including dizziness and a sensation that "the world was shifting without me." These symptoms severely impacted her ability to work and think clearly. She has since restarted the medication at 20mg and reports feeling stable. Patient reports stress at work, describing her office as constantly busy and questioning her career choices. 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 She states, "I want to be stronger and healthier." Patient reports success with consistent meal planning for at least two months, even after the surgery disrupted her routine. She finds this helpful in reducing stress around food choices and avoiding unhealthy options. Patient has reconnected with a previous therapist and plans to resume counseling sessions, expressing enthusiasm about this development.     Past Psychiatric hx:  zoloft 100mg (ineffective), effexor 75mg po qam (through PCP, can't recall effect), trazodone (nightmares), wellbutrin xl 150mg (h/a's and tmj), klonopin, ambien (ineffective), strattera 60mg po daily, lamotrigine, fluoxetine (smells increased), Lexapro 20 mg, Vyvanse (insurance was not covering it)    Past Medical hx:   Past Medical History:   Diagnosis Date    Allergies     Allergy     Anxiety     Colon polyps     CPAP (continuous positive airway pressure) dependence     Depression     Diverticulitis     GERD (gastroesophageal reflux disease)     Hiatal hernia with GERD without esophagitis 07/14/2016    EGD per Dr. Narciso Siddiqi    Hypertension     Polycystic ovarian syndrome     PONV (postoperative nausea and vomiting)     Rectal bleeding     Sleep apnea         Interim hx:  Medication changes last visit: Taper duloxetine to 20 mg      Denies suicidal/homicidal ideations.  Denies hopelessness/worthlessness.    Denies auditory/visual hallucinations      SubstHx:   Tobacco - Pt denies  Etoh - quit 11/2015 due to migraines; h/o binge drinking in college  Drugs - Pt denies   Caffeine- minimal- sometimes tea      Review of Systems   PSYCHIATRIC: Pertinent items are noted in the narrative.        CONSTITUTIONAL: weight stable    Past Medical, Family and Social History: The patient's past medical, family and " social history have been reviewed and updated as appropriate within the electronic medical record. See encounter notes.     Current Psychiatric Medication:  duloxetine 20 mg.     Compliance: yes      Side effects: Pt denied     Risk Parameters:  Patient reports no suicidal ideation  Patient reports no homicidal ideation  Patient reports no self-injurious behavior  Patient reports no violent behavior     Exam (detailed: at least 9 elements; comprehensive: all 15 elements)   Constitutional  Vitals:  Most recent vital signs, dated less than 90 days prior to this appointment, were reviewed. BP: ()/()   Arterial Line BP: ()/()       General:  unremarkable, age appropriate, casual attire, good eye contact, good rapport       Musculoskeletal  Muscle Strength/Tone:  no flaccidity, no tremor    Gait & Station:  normal      Psychiatric                       Speech:  normal tone, normal rate, rhythm, and volume   Mood & Affect:   Mild anxiety         Thought Process:   Goal directed; Linear    Associations:   intact   Thought Content:   No SI/HI, delusions, or paranoia, no AV/VH   Insight & Judgement:   Good, adequate to circumstances   Orientation:   grossly intact; alert and oriented x 4    Memory:  intact for content of interview    Language:  grossly intact, can repeat    Attention Span  : Grossly intact for content of interview   Fund of Knowledge:   intact and appropriate to age and level of education        Assessment and Diagnosis     Assessment & Plan    IMPRESSION:  - Continued duloxetine 20 mg daily after significant withdrawal symptoms when attempting to discontinue.  - Noted successful adherence to meal planning despite surgery-related setbacks.  - Recognized initiative in acquiring an exercise bike for home use, supporting potential mood and anxiety management.  - Supported decision to reconnect with previous therapist for ongoing mental health support.    PLAN SUMMARY:  - Use new exercise bike consistently for  stress relief and fitness  - Continue duloxetine 20 mg daily  - Maintain meal planning efforts  - Follow up in 3 months to reassess medication regimen and progress  - Next appointment on Tuesday at 5:30 PM    Diagnosis:   Mood disorder nos (mdd in partial remission v bipolar II d/o- per pt/ sx report)  Generalized Anxiety Disorder  Attention Deficit Hyperactivity Disorder, Inattentive Type  Cluster b personality traits  Intervention/Counseling/Treatment Plan   Medication Management:      1. Cont duloxetine 20 mg     2. Call to report any worsening of symptoms or problems with the medication. Pt instructed to go to ER with thoughts of harming self, others     3. Cont in therapy with Belk[facility] Moncada [facility]    Psychotherapy: none  Target symptoms: anxiety  Why chosen therapy is appropriate versus another modality: CBT used; relevant to diagnosis, patient responds to this modality  Outcome monitoring methods: self-report, observation  Therapeutic intervention type: CBT techniques  Topics discussed/themes: building skills sets for symptom management, symptom recognition, nutrition, exercise  The patient's response to the intervention is accepting  Patient's response to treatment is: good.   The patient's progress toward treatment goals: limited      Return to clinic: 3 months    -Cognitive-Behavioral/Supportive therapy and psychoeducation provided  -R/B/SE's of medications discussed with the pt who expresses understanding and chooses to take medications as prescribed.   -Pt instructed to call clinic, 911 or go to nearest emergency room if sxs worsen or pt is in   crisis. The pt expresses understanding.    Carroll Trent, PhD, MP    Visit today included increased complexity associated with the care of the episodic problem anxiety, depression addressed and managing the longitudinal care of the patient due to the serious and/or complex managed problem(s) anxiety, depression.      This note was generated with the  assistance of ambient listening technology. Verbal consent was obtained by the patient and accompanying visitor(s) for the recording of patient appointment to facilitate this note. I attest to having reviewed and edited the generated note for accuracy, though some syntax or spelling errors may persist. Please contact the author of this note for any clarification.     Antidepressant/Antianxiety Medication Initiation:  Patient informed of risks, benefits, and potential side effects of medication and accepts informed consent.  Common side effects include nausea, fatigue, headache, insomnia., Specifically discussed the possibility of new or worsening suicidal thoughts/depression.  Patient instructed to stop the medication immediately and seek urgent treatment if this occurs. Patient instructed not to abruptly discontinue medication without physician guidance except in cases of sudden onset or worsening of SI.       Pregnancy Warning:  Patient denies current pregnancy possibility.  Patient made aware that medications have not been proven safe in pregnancy and that she must maintain adequate birth control.  Patient instructed to alert us immediately if she becomes pregnant.

## 2025-07-28 DIAGNOSIS — F39 MOOD DISORDER: ICD-10-CM

## 2025-07-28 DIAGNOSIS — F41.1 GAD (GENERALIZED ANXIETY DISORDER): ICD-10-CM

## 2025-07-28 RX ORDER — DULOXETIN HYDROCHLORIDE 20 MG/1
20 CAPSULE, DELAYED RELEASE ORAL DAILY
Qty: 90 CAPSULE | Refills: 0 | Status: SHIPPED | OUTPATIENT
Start: 2025-07-28 | End: 2026-07-28

## 2025-07-28 NOTE — TELEPHONE ENCOUNTER
Patient comment: Hi! Could you send in  a 90 day refill request for this medication instead of 30days to [facility]? I ran out of my medication today. Thank you     Tyreeo-6/30  Lov-4/30  Nov-none- called no answer. ashley

## 2025-08-27 ENCOUNTER — PATIENT MESSAGE (OUTPATIENT)
Dept: PSYCHIATRY | Facility: CLINIC | Age: 45
End: 2025-08-27
Payer: COMMERCIAL

## 2025-08-27 DIAGNOSIS — F39 MOOD DISORDER: ICD-10-CM

## 2025-08-27 DIAGNOSIS — F41.1 GAD (GENERALIZED ANXIETY DISORDER): ICD-10-CM

## 2025-08-27 RX ORDER — DULOXETIN HYDROCHLORIDE 20 MG/1
20 CAPSULE, DELAYED RELEASE ORAL DAILY
Qty: 90 CAPSULE | Refills: 0 | Status: SHIPPED | OUTPATIENT
Start: 2025-08-27 | End: 2026-08-27